# Patient Record
Sex: FEMALE | Race: WHITE | NOT HISPANIC OR LATINO | Employment: UNEMPLOYED | URBAN - NONMETROPOLITAN AREA
[De-identification: names, ages, dates, MRNs, and addresses within clinical notes are randomized per-mention and may not be internally consistent; named-entity substitution may affect disease eponyms.]

---
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
## 2024-01-22 ENCOUNTER — OFFICE VISIT (OUTPATIENT)
Dept: URGENT CARE | Facility: PHYSICIAN GROUP | Age: 19
End: 2024-01-22
Payer: MEDICAID

## 2024-01-22 VITALS
HEART RATE: 67 BPM | BODY MASS INDEX: 37.71 KG/M2 | TEMPERATURE: 97.1 F | OXYGEN SATURATION: 97 % | DIASTOLIC BLOOD PRESSURE: 84 MMHG | RESPIRATION RATE: 16 BRPM | SYSTOLIC BLOOD PRESSURE: 112 MMHG | HEIGHT: 69 IN | WEIGHT: 254.6 LBS

## 2024-01-22 DIAGNOSIS — H61.22 IMPACTED CERUMEN OF LEFT EAR: ICD-10-CM

## 2024-01-22 PROCEDURE — 3079F DIAST BP 80-89 MM HG: CPT | Performed by: FAMILY MEDICINE

## 2024-01-22 PROCEDURE — 69210 REMOVE IMPACTED EAR WAX UNI: CPT | Performed by: FAMILY MEDICINE

## 2024-01-22 PROCEDURE — 3074F SYST BP LT 130 MM HG: CPT | Performed by: FAMILY MEDICINE

## 2024-01-22 RX ORDER — ONDANSETRON 4 MG/1
TABLET, FILM COATED ORAL
COMMUNITY
Start: 2023-12-18 | End: 2024-01-23

## 2024-01-22 RX ORDER — METHYLPREDNISOLONE 4 MG/1
TABLET ORAL
COMMUNITY
Start: 2023-12-27 | End: 2024-01-23

## 2024-01-22 RX ORDER — PHENAZOPYRIDINE HYDROCHLORIDE 200 MG/1
TABLET, FILM COATED ORAL
COMMUNITY
Start: 2024-01-06 | End: 2024-01-23

## 2024-01-22 RX ORDER — SULFAMETHOXAZOLE AND TRIMETHOPRIM 800; 160 MG/1; MG/1
1 TABLET ORAL 2 TIMES DAILY
COMMUNITY
Start: 2024-01-06 | End: 2024-01-23

## 2024-01-22 ASSESSMENT — FIBROSIS 4 INDEX: FIB4 SCORE: 0.3

## 2024-01-22 NOTE — LETTER
January 22, 2024         Patient: Ani Archuleta   YOB: 2005   Date of Visit: 1/22/2024           To Whom it May Concern:    Ani Archuleta was seen in my clinic on 1/22/2024.       Please excuse absence due to doctor appointment.       If you have any questions or concerns, please don't hesitate to call.        Sincerely,           Ke White M.D.  Electronically Signed

## 2024-01-23 ENCOUNTER — OFFICE VISIT (OUTPATIENT)
Dept: MEDICAL GROUP | Facility: MEDICAL CENTER | Age: 19
End: 2024-01-23
Attending: FAMILY MEDICINE
Payer: MEDICAID

## 2024-01-23 DIAGNOSIS — Z23 NEED FOR VACCINATION: ICD-10-CM

## 2024-01-23 DIAGNOSIS — Z87.09 HISTORY OF STREP SORE THROAT: ICD-10-CM

## 2024-01-23 DIAGNOSIS — N92.1 MENORRHAGIA WITH IRREGULAR CYCLE: ICD-10-CM

## 2024-01-23 DIAGNOSIS — E28.2 PCOS (POLYCYSTIC OVARIAN SYNDROME): ICD-10-CM

## 2024-01-23 DIAGNOSIS — Q89.3 SITUS INVERSUS TOTALIS: ICD-10-CM

## 2024-01-23 PROCEDURE — 99204 OFFICE O/P NEW MOD 45 MIN: CPT | Mod: 25 | Performed by: FAMILY MEDICINE

## 2024-01-23 PROCEDURE — 99213 OFFICE O/P EST LOW 20 MIN: CPT | Performed by: FAMILY MEDICINE

## 2024-01-23 PROCEDURE — 90686 IIV4 VACC NO PRSV 0.5 ML IM: CPT

## 2024-01-23 RX ORDER — LEVONORGESTREL 52 MG/1
1 INTRAUTERINE DEVICE INTRAUTERINE ONCE
COMMUNITY

## 2024-01-23 ASSESSMENT — PATIENT HEALTH QUESTIONNAIRE - PHQ9: CLINICAL INTERPRETATION OF PHQ2 SCORE: 0

## 2024-01-23 NOTE — ASSESSMENT & PLAN NOTE
Patient previously referred to pulmonology for rule out of Kartagener syndrome given association with diagnosis.  Patient is otherwise clinically stable.  States that she was lost to follow-up, but would would like to have consultation given her recent issues with recurrent throat infections.

## 2024-01-23 NOTE — ASSESSMENT & PLAN NOTE
This is a chronic condition, ongoing since age 15.    Established with [facility]  Had Mirena IUD placed 5 weeks ago.  Previously was on Depo, but was not effective in helping control heavy bleeding.  Has been on various OCPs in past  Feels like her body gets used to treatment and then will stop responding.

## 2024-01-23 NOTE — PROGRESS NOTES
Subjective:     CC:  No chief complaint on file.      HISTORY OF THE PRESENT ILLNESS: Ani Archuleta is a 18 y.o. female.  This pleasant patient is here today with her mother Radha, to establish primary care with me and discuss the following issues.   Her prior PCP was Pina Grace; last seen 2022    Specialists   Gynecology    Menorrhagia with irregular cycle  This is a chronic condition, ongoing since age 15.    Established with [facility]  Had Mirena IUD placed 5 weeks ago.  Previously was on Depo, but was not effective in helping control heavy bleeding.  Has been on various OCPs in past  Feels like her body gets used to treatment and then will stop responding.    PCOS (polycystic ovarian syndrome)  This is recently confirmed diagnosis through her current Gyn.    Situs inversus totalis  Patient previously referred to pulmonology for rule out of Kartagener syndrome given association with diagnosis.  Patient is otherwise clinically stable.  States that she was lost to follow-up, but would would like to have consultation given her recent issues with recurrent throat infections.      Allergies: Patient has no known allergies.      [facility], [place] - [address] [facility]  [address] [place] [id number]  Phone: [phone] Fax: [phone]    [id number] [place], [place] - [address] [facility] [address] [place] [address] & [address]  [address] [place], [place] [address]  [facility] [address]  Phone: [phone] Fax: [phone]      Current Outpatient Medications   Medication Sig Dispense Refill    levonorgestrel (MIRENA, 52 MG,) 20 MCG/DAY IUD 1 Each by Intrauterine route one time.       No current facility-administered medications for this visit.       History reviewed. No pertinent past medical history.    History reviewed. No pertinent surgical history.    Family History   Adopted: Yes   Family history unknown: Yes       Social History     Tobacco Use    Smoking  status: Never    Smokeless tobacco: Never   Vaping Use    Vaping Use: Never used   Substance Use Topics    Alcohol use: Not Currently     Comment: Admits to occasional drinking    Drug use: Never       Objective:     There were no vitals taken for this visit.  Physical Exam  Constitutional: Alert, no distress  Skin: No rashes in visible areas  Eye: Conjunctiva clear, lids normal  Respiratory: Unlabored respiratory effort on room air, no cough, lungs clear to auscultation bilaterally  CV: RRR, auscultated on right side  MSK: Normal gait, moves all extremities  Psych: Alert and oriented x3, normal affect and mood      Assessment & Plan:   18 y.o. female with the following -    1. Menorrhagia with irregular cycle  - Referral to Gynecology per patient request.  Patient would like to reestablish with [facility] preferably for second opinions regarding management considerations.    2. PCOS (polycystic ovarian syndrome)  -Previously diagnosed; patient follows with gynecology who made diagnosis roughly 6 months ago.  Patient tells me that recent lab work was essentially within normal.  Will try to obtain records to determine need for additional test.  - Referral to Gynecology  - Lipid Profile; Future    3. Situs inversus totalis  -Chronic condition; clinically stable.  However patient reports previous recurrent throat infections.  Discussed association with Kartagener is syndrome and previous recommendation for specialty evaluation.patient and parent agreeable for consultation  - Referral to Gynecology  - Referral to Pulmonary and Sleep Medicine    4. History of strep sore throat  - Referral to ENT for consultation of tonsillectomy    5. Need for vaccination  - INFLUENZA VACCINE QUAD INJ (PF)    Other orders  - levonorgestrel (MIRENA, 52 MG,) 20 MCG/DAY IUD; 1 Each by Intrauterine route one time.       Return if symptoms worsen or fail to improve, for routine annual wellness exam.    Please note that this dictation was created  using voice recognition software. I have made every reasonable attempt to correct obvious errors, but I expect that there are errors of grammar and possibly content that I did not discover before finalizing the note.

## 2024-01-23 NOTE — PROGRESS NOTES
"Subjective:      Chief Complaint   Patient presents with    Otalgia     Left ear pain, with drainage and muffles hearing x 2 days              HPI        Pt c/o left  earwax impaction       Review of Systems   HENT: Positive for hearing loss.           Objective:     /84   Pulse 67   Temp 36.2 °C (97.1 °F) (Temporal)   Resp 16   Ht 1.753 m (5' 9\")   Wt 115 kg (254 lb 9.6 oz)   SpO2 97%       Physical Exam   Constitutional: He is oriented to person, place, and time. He appears well-developed. No distress.   HENT:   Head: Normocephalic and atraumatic.   Left ear:   cerumen impaction     Eyes: Conjunctivae are normal.   Cardiovascular: Normal rate.    Pulmonary/Chest: Effort normal.   Neurological: He is alert and oriented to person, place, and time.   Skin: Skin is warm. He is not diaphoretic. No erythema.   Psychiatric: His behavior is normal.   Nursing note and vitals reviewed.              Assessment/Plan:       1. Left  impacted cerumen    Ear canal  impacted with cerumen. Ear lavage was performed with normal saline, afterward impacted cerumen was removed with speculum. Subsequent to this, tympanic membrane visualized and was normal.     "

## 2024-02-01 ENCOUNTER — OFFICE VISIT (OUTPATIENT)
Dept: URGENT CARE | Facility: PHYSICIAN GROUP | Age: 19
End: 2024-02-01
Payer: MEDICAID

## 2024-02-01 ENCOUNTER — HOSPITAL ENCOUNTER (OUTPATIENT)
Facility: MEDICAL CENTER | Age: 19
End: 2024-02-01
Attending: PHYSICIAN ASSISTANT
Payer: MEDICAID

## 2024-02-01 VITALS
WEIGHT: 255 LBS | RESPIRATION RATE: 18 BRPM | BODY MASS INDEX: 40.98 KG/M2 | DIASTOLIC BLOOD PRESSURE: 70 MMHG | HEART RATE: 90 BPM | OXYGEN SATURATION: 99 % | TEMPERATURE: 98.6 F | SYSTOLIC BLOOD PRESSURE: 90 MMHG | HEIGHT: 66 IN

## 2024-02-01 DIAGNOSIS — J03.90 TONSILLITIS: ICD-10-CM

## 2024-02-01 LAB — S PYO DNA SPEC NAA+PROBE: NOT DETECTED

## 2024-02-01 PROCEDURE — 87070 CULTURE OTHR SPECIMN AEROBIC: CPT

## 2024-02-01 PROCEDURE — 3078F DIAST BP <80 MM HG: CPT | Performed by: PHYSICIAN ASSISTANT

## 2024-02-01 PROCEDURE — 99213 OFFICE O/P EST LOW 20 MIN: CPT | Performed by: PHYSICIAN ASSISTANT

## 2024-02-01 PROCEDURE — 87651 STREP A DNA AMP PROBE: CPT | Performed by: PHYSICIAN ASSISTANT

## 2024-02-01 PROCEDURE — 3074F SYST BP LT 130 MM HG: CPT | Performed by: PHYSICIAN ASSISTANT

## 2024-02-01 RX ORDER — LIDOCAINE HYDROCHLORIDE 20 MG/ML
5-15 SOLUTION OROPHARYNGEAL EVERY 4 HOURS PRN
Qty: 100 ML | Refills: 1 | Status: SHIPPED | OUTPATIENT
Start: 2024-02-01 | End: 2024-02-14

## 2024-02-01 RX ORDER — DEXAMETHASONE SODIUM PHOSPHATE 10 MG/ML
10 INJECTION INTRAMUSCULAR; INTRAVENOUS ONCE
Status: COMPLETED | OUTPATIENT
Start: 2024-02-01 | End: 2024-02-01

## 2024-02-01 RX ADMIN — DEXAMETHASONE SODIUM PHOSPHATE 10 MG: 10 INJECTION INTRAMUSCULAR; INTRAVENOUS at 15:27

## 2024-02-01 ASSESSMENT — ENCOUNTER SYMPTOMS
SORE THROAT: 1
HEADACHES: 1
CHILLS: 0
FEVER: 0
COUGH: 1
DIARRHEA: 1
NAUSEA: 0
VOMITING: 0
MYALGIAS: 1

## 2024-02-01 ASSESSMENT — FIBROSIS 4 INDEX: FIB4 SCORE: 0.3

## 2024-02-01 NOTE — PROGRESS NOTES
"Subjective     Ani Archuleta is a 18 y.o. female who presents with Pharyngitis    HPI:  Ani Archuleta is a 18 y.o. female who presents today for evaluation of sore throat and URI symptoms.  Symptoms started 2 to 3 days ago.  She has had sore throat which has been gradually worsening.  She has also had some congestion, cough, fatigue, headaches, body aches, diarrhea.  She has not had any fever.  Took some ibuprofen yesterday but says it did not help very much and she was having a hard time swallowing the pills.  Of note, patient has recurrent strep infections.  She is scheduled to see an ear nose and throat specialist on March 5.        Review of Systems   Constitutional:  Positive for malaise/fatigue. Negative for chills and fever.   HENT:  Positive for congestion and sore throat.    Respiratory:  Positive for cough.    Gastrointestinal:  Positive for diarrhea. Negative for nausea and vomiting.   Musculoskeletal:  Positive for myalgias.   Neurological:  Positive for headaches.             PMH:  has no past medical history of Asthma or Diabetes ([facility]).  MEDS:   Current Outpatient Medications:     levonorgestrel (MIRENA, 52 MG,) 20 MCG/DAY IUD, 1 Each by Intrauterine route one time., Disp: , Rfl:   ALLERGIES: No Known Allergies  SURGHX: No past surgical history on file.  SOCHX:  reports that she has never smoked. She has never used smokeless tobacco. She reports that she does not currently use alcohol. She reports that she does not use drugs.  FH: Family history was reviewed, no pertinent findings to report      Objective     BP 90/70   Pulse 90   Temp 37 °C (98.6 °F) (Temporal)   Resp 18   Ht 1.676 m (5' 6\")   Wt 116 kg (255 lb)   SpO2 99%   BMI 41.16 kg/m²      Physical Exam  Constitutional:       Appearance: She is well-developed.   HENT:      Head: Normocephalic and atraumatic.      Right Ear: Tympanic membrane, ear canal and external ear normal.      Left Ear: Tympanic membrane, ear canal and " external ear normal.      Nose: Mucosal edema and congestion present. No rhinorrhea.      Mouth/Throat:      Lips: Pink.      Mouth: Mucous membranes are moist.      Pharynx: Uvula midline. Oropharyngeal exudate and posterior oropharyngeal erythema present. No uvula swelling.      Tonsils: Tonsillar exudate present. No tonsillar abscesses. 2+ on the right. 2+ on the left.   Eyes:      Conjunctiva/sclera: Conjunctivae normal.      Pupils: Pupils are equal, round, and reactive to light.   Cardiovascular:      Rate and Rhythm: Normal rate and regular rhythm.      Heart sounds: Normal heart sounds. No murmur heard.  Pulmonary:      Effort: Pulmonary effort is normal.      Breath sounds: Normal breath sounds. No wheezing.   Musculoskeletal:      Cervical back: Normal range of motion.   Lymphadenopathy:      Cervical: Cervical adenopathy present.      Right cervical: Superficial cervical adenopathy present.      Left cervical: Superficial cervical adenopathy present.   Skin:     General: Skin is warm and dry.      Capillary Refill: Capillary refill takes less than 2 seconds.   Neurological:      Mental Status: She is alert and oriented to person, place, and time.   Psychiatric:         Behavior: Behavior normal.         Judgment: Judgment normal.       POCT GROUP A STREP, PCR - Negative    Assessment & Plan     1. Tonsillitis  - POCT GROUP A STREP, PCR  - dexamethasone (Decadron) injection (check route below) 10 mg  - CULTURE THROAT; Future  - lidocaine (XYLOCAINE) 2 % Solution; Take 5-15 mL by mouth every four hours as needed for Throat/Mouth Pain.  Dispense: 100 mL; Refill: 1  PCR strep test negative.  Discussed results with patient.  Did discuss that given her constellation of symptoms I think it is very likely that this is a viral etiology but given her history we will obtain a throat culture to further rule out any bacterial infection.  Will notify her via [employer] when results become available and we will send over  antibiotics if needed.  For now she was treated with 10 mg oral Decadron and I have sent over some viscous lidocaine solution to the pharmacy.  She can also utilize supportive care at home to include salt water gargles, throat lozenges, tea, increase fluid intake.  She may use OTC acetaminophen or ibuprofen as needed for pain or fever.                      Differential Diagnosis, natural history, and supportive care discussed. Return to the Urgent Care or follow up with your PCP if symptoms fail to resolve, or for any new or worsening symptoms. Emergency room precautions discussed. Patient and/or family appears understanding of information.

## 2024-02-06 ENCOUNTER — APPOINTMENT (OUTPATIENT)
Dept: RADIOLOGY | Facility: MEDICAL CENTER | Age: 19
DRG: 603 | End: 2024-02-06
Attending: EMERGENCY MEDICINE
Payer: MEDICAID

## 2024-02-06 ENCOUNTER — HOSPITAL ENCOUNTER (INPATIENT)
Facility: MEDICAL CENTER | Age: 19
LOS: 6 days | DRG: 603 | End: 2024-02-12
Attending: EMERGENCY MEDICINE | Admitting: SURGERY
Payer: MEDICAID

## 2024-02-06 ENCOUNTER — ANESTHESIA EVENT (OUTPATIENT)
Dept: SURGERY | Facility: MEDICAL CENTER | Age: 19
DRG: 603 | End: 2024-02-06
Payer: MEDICAID

## 2024-02-06 ENCOUNTER — ANESTHESIA (OUTPATIENT)
Dept: SURGERY | Facility: MEDICAL CENTER | Age: 19
DRG: 603 | End: 2024-02-06
Payer: MEDICAID

## 2024-02-06 DIAGNOSIS — L05.01 SACROCOCCYGEAL PILONIDAL CYST WITH ABSCESS: ICD-10-CM

## 2024-02-06 DIAGNOSIS — Q89.3 SITUS INVERSUS TOTALIS: ICD-10-CM

## 2024-02-06 DIAGNOSIS — L05.01 PILONIDAL CYST WITH ABSCESS: ICD-10-CM

## 2024-02-06 DIAGNOSIS — M53.3 SACRAL PAIN: ICD-10-CM

## 2024-02-06 PROBLEM — E66.01 MORBID OBESITY WITH BMI OF 40.0-44.9, ADULT (HCC): Status: ACTIVE | Noted: 2021-08-11

## 2024-02-06 PROBLEM — L05.91 PILONIDAL CYST: Status: ACTIVE | Noted: 2024-02-06

## 2024-02-06 LAB
ALBUMIN SERPL BCP-MCNC: 4.3 G/DL (ref 3.2–4.9)
ALBUMIN/GLOB SERPL: 1.3 G/DL
ALP SERPL-CCNC: 116 U/L (ref 30–99)
ALT SERPL-CCNC: 17 U/L (ref 2–50)
ANION GAP SERPL CALC-SCNC: 14 MMOL/L (ref 7–16)
APPEARANCE UR: ABNORMAL
AST SERPL-CCNC: 11 U/L (ref 12–45)
BACTERIA #/AREA URNS HPF: ABNORMAL /HPF
BASOPHILS # BLD AUTO: 0.1 % (ref 0–1.8)
BASOPHILS # BLD: 0.02 K/UL (ref 0–0.12)
BILIRUB SERPL-MCNC: 0.5 MG/DL (ref 0.1–1.5)
BILIRUB UR QL STRIP.AUTO: NEGATIVE
BUN SERPL-MCNC: 9 MG/DL (ref 8–22)
CALCIUM ALBUM COR SERPL-MCNC: 9.1 MG/DL (ref 8.5–10.5)
CALCIUM SERPL-MCNC: 9.3 MG/DL (ref 8.5–10.5)
CHLORIDE SERPL-SCNC: 106 MMOL/L (ref 96–112)
CO2 SERPL-SCNC: 19 MMOL/L (ref 20–33)
COLOR UR: YELLOW
CREAT SERPL-MCNC: 0.66 MG/DL (ref 0.5–1.4)
CRP SERPL HS-MCNC: 3.17 MG/DL (ref 0–0.75)
EOSINOPHIL # BLD AUTO: 0.04 K/UL (ref 0–0.51)
EOSINOPHIL NFR BLD: 0.3 % (ref 0–6.9)
EPI CELLS #/AREA URNS HPF: ABNORMAL /HPF
ERYTHROCYTE [DISTWIDTH] IN BLOOD BY AUTOMATED COUNT: 39.9 FL (ref 35.9–50)
ERYTHROCYTE [SEDIMENTATION RATE] IN BLOOD BY WESTERGREN METHOD: 5 MM/HOUR (ref 0–25)
GFR SERPLBLD CREATININE-BSD FMLA CKD-EPI: 129 ML/MIN/1.73 M 2
GLOBULIN SER CALC-MCNC: 3.2 G/DL (ref 1.9–3.5)
GLUCOSE SERPL-MCNC: 110 MG/DL (ref 65–99)
GLUCOSE UR STRIP.AUTO-MCNC: NEGATIVE MG/DL
HCG SERPL QL: NEGATIVE
HCT VFR BLD AUTO: 48.9 % (ref 37–47)
HGB BLD-MCNC: 16.4 G/DL (ref 12–16)
HYALINE CASTS #/AREA URNS LPF: ABNORMAL /LPF
IMM GRANULOCYTES # BLD AUTO: 0.06 K/UL (ref 0–0.11)
IMM GRANULOCYTES NFR BLD AUTO: 0.4 % (ref 0–0.9)
KETONES UR STRIP.AUTO-MCNC: NEGATIVE MG/DL
LEUKOCYTE ESTERASE UR QL STRIP.AUTO: ABNORMAL
LYMPHOCYTES # BLD AUTO: 2.85 K/UL (ref 1–4.8)
LYMPHOCYTES NFR BLD: 20.4 % (ref 22–41)
MCH RBC QN AUTO: 29 PG (ref 27–33)
MCHC RBC AUTO-ENTMCNC: 33.5 G/DL (ref 32.2–35.5)
MCV RBC AUTO: 86.5 FL (ref 81.4–97.8)
MICRO URNS: ABNORMAL
MONOCYTES # BLD AUTO: 1.01 K/UL (ref 0–0.85)
MONOCYTES NFR BLD AUTO: 7.2 % (ref 0–13.4)
NEUTROPHILS # BLD AUTO: 9.96 K/UL (ref 1.82–7.42)
NEUTROPHILS NFR BLD: 71.6 % (ref 44–72)
NITRITE UR QL STRIP.AUTO: NEGATIVE
NRBC # BLD AUTO: 0 K/UL
NRBC BLD-RTO: 0 /100 WBC (ref 0–0.2)
PH UR STRIP.AUTO: 6 [PH] (ref 5–8)
PLATELET # BLD AUTO: 285 K/UL (ref 164–446)
PMV BLD AUTO: 9.4 FL (ref 9–12.9)
POTASSIUM SERPL-SCNC: 4.1 MMOL/L (ref 3.6–5.5)
PROT SERPL-MCNC: 7.5 G/DL (ref 6–8.2)
PROT UR QL STRIP: NEGATIVE MG/DL
RBC # BLD AUTO: 5.65 M/UL (ref 4.2–5.4)
RBC # URNS HPF: ABNORMAL /HPF
RBC UR QL AUTO: ABNORMAL
SODIUM SERPL-SCNC: 139 MMOL/L (ref 135–145)
SP GR UR STRIP.AUTO: >=1.03
UROBILINOGEN UR STRIP.AUTO-MCNC: 0.2 MG/DL
WBC # BLD AUTO: 13.9 K/UL (ref 4.8–10.8)
WBC #/AREA URNS HPF: ABNORMAL /HPF

## 2024-02-06 PROCEDURE — 87205 SMEAR GRAM STAIN: CPT | Mod: 91

## 2024-02-06 PROCEDURE — 700102 HCHG RX REV CODE 250 W/ 637 OVERRIDE(OP): Performed by: ANESTHESIOLOGY

## 2024-02-06 PROCEDURE — 160048 HCHG OR STATISTICAL LEVEL 1-5: Performed by: SURGERY

## 2024-02-06 PROCEDURE — A9270 NON-COVERED ITEM OR SERVICE: HCPCS | Performed by: ANESTHESIOLOGY

## 2024-02-06 PROCEDURE — 81001 URINALYSIS AUTO W/SCOPE: CPT

## 2024-02-06 PROCEDURE — A9270 NON-COVERED ITEM OR SERVICE: HCPCS

## 2024-02-06 PROCEDURE — 700101 HCHG RX REV CODE 250

## 2024-02-06 PROCEDURE — 87070 CULTURE OTHR SPECIMN AEROBIC: CPT

## 2024-02-06 PROCEDURE — 700111 HCHG RX REV CODE 636 W/ 250 OVERRIDE (IP): Performed by: ANESTHESIOLOGY

## 2024-02-06 PROCEDURE — 87102 FUNGUS ISOLATION CULTURE: CPT

## 2024-02-06 PROCEDURE — 700102 HCHG RX REV CODE 250 W/ 637 OVERRIDE(OP)

## 2024-02-06 PROCEDURE — 700101 HCHG RX REV CODE 250: Performed by: ANESTHESIOLOGY

## 2024-02-06 PROCEDURE — 85652 RBC SED RATE AUTOMATED: CPT

## 2024-02-06 PROCEDURE — 99291 CRITICAL CARE FIRST HOUR: CPT

## 2024-02-06 PROCEDURE — 700111 HCHG RX REV CODE 636 W/ 250 OVERRIDE (IP): Mod: JZ | Performed by: ANESTHESIOLOGY

## 2024-02-06 PROCEDURE — 700111 HCHG RX REV CODE 636 W/ 250 OVERRIDE (IP)

## 2024-02-06 PROCEDURE — 0J990ZZ DRAINAGE OF BUTTOCK SUBCUTANEOUS TISSUE AND FASCIA, OPEN APPROACH: ICD-10-PCS | Performed by: SURGERY

## 2024-02-06 PROCEDURE — 700117 HCHG RX CONTRAST REV CODE 255: Mod: UD | Performed by: EMERGENCY MEDICINE

## 2024-02-06 PROCEDURE — 80053 COMPREHEN METABOLIC PANEL: CPT

## 2024-02-06 PROCEDURE — 770001 HCHG ROOM/CARE - MED/SURG/GYN PRIV*

## 2024-02-06 PROCEDURE — 10081 I&D PILONIDAL CYST COMP: CPT | Performed by: SURGERY

## 2024-02-06 PROCEDURE — 36415 COLL VENOUS BLD VENIPUNCTURE: CPT

## 2024-02-06 PROCEDURE — 160038 HCHG SURGERY MINUTES - EA ADDL 1 MIN LEVEL 2: Performed by: SURGERY

## 2024-02-06 PROCEDURE — 84703 CHORIONIC GONADOTROPIN ASSAY: CPT

## 2024-02-06 PROCEDURE — 96375 TX/PRO/DX INJ NEW DRUG ADDON: CPT

## 2024-02-06 PROCEDURE — 86140 C-REACTIVE PROTEIN: CPT

## 2024-02-06 PROCEDURE — 160002 HCHG RECOVERY MINUTES (STAT): Performed by: SURGERY

## 2024-02-06 PROCEDURE — 87075 CULTR BACTERIA EXCEPT BLOOD: CPT

## 2024-02-06 PROCEDURE — 87077 CULTURE AEROBIC IDENTIFY: CPT

## 2024-02-06 PROCEDURE — 160027 HCHG SURGERY MINUTES - 1ST 30 MINS LEVEL 2: Performed by: SURGERY

## 2024-02-06 PROCEDURE — 96374 THER/PROPH/DIAG INJ IV PUSH: CPT

## 2024-02-06 PROCEDURE — 160035 HCHG PACU - 1ST 60 MINS PHASE I: Performed by: SURGERY

## 2024-02-06 PROCEDURE — 85025 COMPLETE CBC W/AUTO DIFF WBC: CPT

## 2024-02-06 PROCEDURE — 74177 CT ABD & PELVIS W/CONTRAST: CPT

## 2024-02-06 PROCEDURE — 700111 HCHG RX REV CODE 636 W/ 250 OVERRIDE (IP): Performed by: EMERGENCY MEDICINE

## 2024-02-06 PROCEDURE — 87076 CULTURE ANAEROBE IDENT EACH: CPT

## 2024-02-06 PROCEDURE — 160009 HCHG ANES TIME/MIN: Performed by: SURGERY

## 2024-02-06 PROCEDURE — 700105 HCHG RX REV CODE 258: Performed by: ANESTHESIOLOGY

## 2024-02-06 PROCEDURE — 99253 IP/OBS CNSLTJ NEW/EST LOW 45: CPT | Mod: 25 | Performed by: SURGERY

## 2024-02-06 PROCEDURE — 700101 HCHG RX REV CODE 250: Performed by: SURGERY

## 2024-02-06 RX ORDER — SUCCINYLCHOLINE CHLORIDE 20 MG/ML
INJECTION INTRAMUSCULAR; INTRAVENOUS PRN
Status: DISCONTINUED | OUTPATIENT
Start: 2024-02-06 | End: 2024-02-06 | Stop reason: SURG

## 2024-02-06 RX ORDER — OXYCODONE HYDROCHLORIDE 5 MG/1
5 TABLET ORAL
Status: DISCONTINUED | OUTPATIENT
Start: 2024-02-06 | End: 2024-02-12 | Stop reason: HOSPADM

## 2024-02-06 RX ORDER — HALOPERIDOL 5 MG/ML
INJECTION INTRAMUSCULAR PRN
Status: DISCONTINUED | OUTPATIENT
Start: 2024-02-06 | End: 2024-02-06 | Stop reason: SURG

## 2024-02-06 RX ORDER — ENEMA 19; 7 G/133ML; G/133ML
1 ENEMA RECTAL
Status: DISCONTINUED | OUTPATIENT
Start: 2024-02-06 | End: 2024-02-12 | Stop reason: HOSPADM

## 2024-02-06 RX ORDER — ONDANSETRON 2 MG/ML
4 INJECTION INTRAMUSCULAR; INTRAVENOUS ONCE
Status: COMPLETED | OUTPATIENT
Start: 2024-02-06 | End: 2024-02-06

## 2024-02-06 RX ORDER — HYDROMORPHONE HYDROCHLORIDE 1 MG/ML
0.5 INJECTION, SOLUTION INTRAMUSCULAR; INTRAVENOUS; SUBCUTANEOUS
Status: DISCONTINUED | OUTPATIENT
Start: 2024-02-06 | End: 2024-02-12 | Stop reason: HOSPADM

## 2024-02-06 RX ORDER — OXYCODONE HCL 5 MG/5 ML
5 SOLUTION, ORAL ORAL
Status: COMPLETED | OUTPATIENT
Start: 2024-02-06 | End: 2024-02-06

## 2024-02-06 RX ORDER — KETOROLAC TROMETHAMINE 15 MG/ML
INJECTION, SOLUTION INTRAMUSCULAR; INTRAVENOUS PRN
Status: DISCONTINUED | OUTPATIENT
Start: 2024-02-06 | End: 2024-02-06 | Stop reason: SURG

## 2024-02-06 RX ORDER — DIPHENHYDRAMINE HYDROCHLORIDE 50 MG/ML
12.5 INJECTION INTRAMUSCULAR; INTRAVENOUS
Status: DISCONTINUED | OUTPATIENT
Start: 2024-02-06 | End: 2024-02-06 | Stop reason: HOSPADM

## 2024-02-06 RX ORDER — DOXYCYCLINE HYCLATE 100 MG/1
100 CAPSULE ORAL 2 TIMES DAILY
Status: ON HOLD | COMMUNITY
Start: 2024-02-05 | End: 2024-02-09

## 2024-02-06 RX ORDER — HYDROMORPHONE HYDROCHLORIDE 1 MG/ML
0.1 INJECTION, SOLUTION INTRAMUSCULAR; INTRAVENOUS; SUBCUTANEOUS
Status: DISCONTINUED | OUTPATIENT
Start: 2024-02-06 | End: 2024-02-06 | Stop reason: HOSPADM

## 2024-02-06 RX ORDER — ONDANSETRON 2 MG/ML
4 INJECTION INTRAMUSCULAR; INTRAVENOUS EVERY 4 HOURS PRN
Status: DISCONTINUED | OUTPATIENT
Start: 2024-02-06 | End: 2024-02-12 | Stop reason: HOSPADM

## 2024-02-06 RX ORDER — CEFOTETAN DISODIUM 2 G/20ML
INJECTION, POWDER, FOR SOLUTION INTRAMUSCULAR; INTRAVENOUS PRN
Status: DISCONTINUED | OUTPATIENT
Start: 2024-02-06 | End: 2024-02-06 | Stop reason: SURG

## 2024-02-06 RX ORDER — KETOROLAC TROMETHAMINE 15 MG/ML
15 INJECTION, SOLUTION INTRAMUSCULAR; INTRAVENOUS ONCE
Status: COMPLETED | OUTPATIENT
Start: 2024-02-06 | End: 2024-02-06

## 2024-02-06 RX ORDER — AMOXICILLIN 250 MG
1 CAPSULE ORAL
Status: DISCONTINUED | OUTPATIENT
Start: 2024-02-06 | End: 2024-02-12 | Stop reason: HOSPADM

## 2024-02-06 RX ORDER — CELECOXIB 200 MG/1
200 CAPSULE ORAL 2 TIMES DAILY
Status: COMPLETED | OUTPATIENT
Start: 2024-02-06 | End: 2024-02-11

## 2024-02-06 RX ORDER — ACETAMINOPHEN 500 MG
1000 TABLET ORAL EVERY 6 HOURS PRN
Status: DISCONTINUED | OUTPATIENT
Start: 2024-02-12 | End: 2024-02-12 | Stop reason: HOSPADM

## 2024-02-06 RX ORDER — HYDROMORPHONE HYDROCHLORIDE 1 MG/ML
0.2 INJECTION, SOLUTION INTRAMUSCULAR; INTRAVENOUS; SUBCUTANEOUS
Status: DISCONTINUED | OUTPATIENT
Start: 2024-02-06 | End: 2024-02-06 | Stop reason: HOSPADM

## 2024-02-06 RX ORDER — AMOXICILLIN 250 MG
1 CAPSULE ORAL NIGHTLY
Status: DISCONTINUED | OUTPATIENT
Start: 2024-02-06 | End: 2024-02-12 | Stop reason: HOSPADM

## 2024-02-06 RX ORDER — SULFAMETHOXAZOLE AND TRIMETHOPRIM 800; 160 MG/1; MG/1
1 TABLET ORAL 2 TIMES DAILY
Status: ON HOLD | COMMUNITY
Start: 2024-01-06 | End: 2024-02-09

## 2024-02-06 RX ORDER — OXYCODONE HYDROCHLORIDE 5 MG/1
10 TABLET ORAL
Status: DISCONTINUED | OUTPATIENT
Start: 2024-02-06 | End: 2024-02-12 | Stop reason: HOSPADM

## 2024-02-06 RX ORDER — ONDANSETRON 2 MG/ML
4 INJECTION INTRAMUSCULAR; INTRAVENOUS
Status: DISCONTINUED | OUTPATIENT
Start: 2024-02-06 | End: 2024-02-06 | Stop reason: HOSPADM

## 2024-02-06 RX ORDER — OXYCODONE HCL 5 MG/5 ML
10 SOLUTION, ORAL ORAL
Status: COMPLETED | OUTPATIENT
Start: 2024-02-06 | End: 2024-02-06

## 2024-02-06 RX ORDER — MIDAZOLAM HYDROCHLORIDE 1 MG/ML
1 INJECTION INTRAMUSCULAR; INTRAVENOUS
Status: DISCONTINUED | OUTPATIENT
Start: 2024-02-06 | End: 2024-02-06 | Stop reason: HOSPADM

## 2024-02-06 RX ORDER — SODIUM CHLORIDE, SODIUM LACTATE, POTASSIUM CHLORIDE, CALCIUM CHLORIDE 600; 310; 30; 20 MG/100ML; MG/100ML; MG/100ML; MG/100ML
INJECTION, SOLUTION INTRAVENOUS
Status: DISCONTINUED | OUTPATIENT
Start: 2024-02-06 | End: 2024-02-06 | Stop reason: SURG

## 2024-02-06 RX ORDER — HYDROMORPHONE HYDROCHLORIDE 1 MG/ML
1 INJECTION, SOLUTION INTRAMUSCULAR; INTRAVENOUS; SUBCUTANEOUS ONCE
Status: COMPLETED | OUTPATIENT
Start: 2024-02-06 | End: 2024-02-06

## 2024-02-06 RX ORDER — CELECOXIB 200 MG/1
200 CAPSULE ORAL 2 TIMES DAILY PRN
Status: DISCONTINUED | OUTPATIENT
Start: 2024-02-11 | End: 2024-02-12 | Stop reason: HOSPADM

## 2024-02-06 RX ORDER — BISACODYL 10 MG
10 SUPPOSITORY, RECTAL RECTAL
Status: DISCONTINUED | OUTPATIENT
Start: 2024-02-06 | End: 2024-02-12 | Stop reason: HOSPADM

## 2024-02-06 RX ORDER — ACETAMINOPHEN 500 MG
1000 TABLET ORAL EVERY 6 HOURS
Status: DISPENSED | OUTPATIENT
Start: 2024-02-07 | End: 2024-02-12

## 2024-02-06 RX ORDER — LABETALOL HYDROCHLORIDE 5 MG/ML
5 INJECTION, SOLUTION INTRAVENOUS
Status: DISCONTINUED | OUTPATIENT
Start: 2024-02-06 | End: 2024-02-06 | Stop reason: HOSPADM

## 2024-02-06 RX ORDER — ONDANSETRON 4 MG/1
4 TABLET, ORALLY DISINTEGRATING ORAL EVERY 4 HOURS PRN
Status: DISCONTINUED | OUTPATIENT
Start: 2024-02-06 | End: 2024-02-12 | Stop reason: HOSPADM

## 2024-02-06 RX ORDER — ENOXAPARIN SODIUM 100 MG/ML
30 INJECTION SUBCUTANEOUS EVERY 12 HOURS
Status: DISCONTINUED | OUTPATIENT
Start: 2024-02-07 | End: 2024-02-12 | Stop reason: HOSPADM

## 2024-02-06 RX ORDER — MORPHINE SULFATE 4 MG/ML
4 INJECTION INTRAVENOUS ONCE
Status: COMPLETED | OUTPATIENT
Start: 2024-02-06 | End: 2024-02-06

## 2024-02-06 RX ORDER — MEPERIDINE HYDROCHLORIDE 25 MG/ML
12.5 INJECTION INTRAMUSCULAR; INTRAVENOUS; SUBCUTANEOUS
Status: DISCONTINUED | OUTPATIENT
Start: 2024-02-06 | End: 2024-02-06 | Stop reason: HOSPADM

## 2024-02-06 RX ORDER — HYDROMORPHONE HYDROCHLORIDE 1 MG/ML
0.5 INJECTION, SOLUTION INTRAMUSCULAR; INTRAVENOUS; SUBCUTANEOUS ONCE
Status: COMPLETED | OUTPATIENT
Start: 2024-02-06 | End: 2024-02-06

## 2024-02-06 RX ORDER — BUPIVACAINE HYDROCHLORIDE AND EPINEPHRINE 5; 5 MG/ML; UG/ML
INJECTION, SOLUTION EPIDURAL; INTRACAUDAL; PERINEURAL
Status: DISCONTINUED | OUTPATIENT
Start: 2024-02-06 | End: 2024-02-06 | Stop reason: HOSPADM

## 2024-02-06 RX ORDER — DOCUSATE SODIUM 100 MG/1
100 CAPSULE, LIQUID FILLED ORAL 2 TIMES DAILY
Status: DISCONTINUED | OUTPATIENT
Start: 2024-02-06 | End: 2024-02-12 | Stop reason: HOSPADM

## 2024-02-06 RX ORDER — METOPROLOL TARTRATE 1 MG/ML
1 INJECTION, SOLUTION INTRAVENOUS
Status: DISCONTINUED | OUTPATIENT
Start: 2024-02-06 | End: 2024-02-06 | Stop reason: HOSPADM

## 2024-02-06 RX ORDER — POLYETHYLENE GLYCOL 3350 17 G/17G
1 POWDER, FOR SOLUTION ORAL 2 TIMES DAILY
Status: DISCONTINUED | OUTPATIENT
Start: 2024-02-06 | End: 2024-02-12 | Stop reason: HOSPADM

## 2024-02-06 RX ORDER — EPHEDRINE SULFATE 50 MG/ML
5 INJECTION, SOLUTION INTRAVENOUS
Status: DISCONTINUED | OUTPATIENT
Start: 2024-02-06 | End: 2024-02-06 | Stop reason: HOSPADM

## 2024-02-06 RX ORDER — ONDANSETRON 2 MG/ML
INJECTION INTRAMUSCULAR; INTRAVENOUS PRN
Status: DISCONTINUED | OUTPATIENT
Start: 2024-02-06 | End: 2024-02-06 | Stop reason: SURG

## 2024-02-06 RX ORDER — SODIUM CHLORIDE, SODIUM LACTATE, POTASSIUM CHLORIDE, CALCIUM CHLORIDE 600; 310; 30; 20 MG/100ML; MG/100ML; MG/100ML; MG/100ML
INJECTION, SOLUTION INTRAVENOUS CONTINUOUS
Status: DISCONTINUED | OUTPATIENT
Start: 2024-02-06 | End: 2024-02-06 | Stop reason: HOSPADM

## 2024-02-06 RX ORDER — HYDROMORPHONE HYDROCHLORIDE 1 MG/ML
0.4 INJECTION, SOLUTION INTRAMUSCULAR; INTRAVENOUS; SUBCUTANEOUS
Status: DISCONTINUED | OUTPATIENT
Start: 2024-02-06 | End: 2024-02-06 | Stop reason: HOSPADM

## 2024-02-06 RX ORDER — MIDAZOLAM HYDROCHLORIDE 1 MG/ML
INJECTION INTRAMUSCULAR; INTRAVENOUS PRN
Status: DISCONTINUED | OUTPATIENT
Start: 2024-02-06 | End: 2024-02-06 | Stop reason: SURG

## 2024-02-06 RX ORDER — ACETAMINOPHEN 500 MG
1000 TABLET ORAL EVERY 4 HOURS PRN
Status: DISCONTINUED | OUTPATIENT
Start: 2024-02-06 | End: 2024-02-06 | Stop reason: HOSPADM

## 2024-02-06 RX ORDER — HALOPERIDOL 5 MG/ML
1 INJECTION INTRAMUSCULAR
Status: DISCONTINUED | OUTPATIENT
Start: 2024-02-06 | End: 2024-02-06 | Stop reason: HOSPADM

## 2024-02-06 RX ADMIN — OXYCODONE HYDROCHLORIDE 10 MG: 5 SOLUTION ORAL at 20:50

## 2024-02-06 RX ADMIN — HALOPERIDOL LACTATE 1 MG: 5 INJECTION, SOLUTION INTRAMUSCULAR at 19:54

## 2024-02-06 RX ADMIN — KETOROLAC TROMETHAMINE 15 MG: 15 INJECTION, SOLUTION INTRAMUSCULAR; INTRAVENOUS at 20:13

## 2024-02-06 RX ADMIN — CEFOTETAN DISODIUM 2 G: 2 INJECTION, POWDER, FOR SOLUTION INTRAMUSCULAR; INTRAVENOUS at 19:39

## 2024-02-06 RX ADMIN — PROPOFOL 50 MG: 10 INJECTION, EMULSION INTRAVENOUS at 19:34

## 2024-02-06 RX ADMIN — ONDANSETRON 4 MG: 2 INJECTION INTRAMUSCULAR; INTRAVENOUS at 13:07

## 2024-02-06 RX ADMIN — MORPHINE SULFATE 4 MG: 4 INJECTION, SOLUTION INTRAMUSCULAR; INTRAVENOUS at 13:07

## 2024-02-06 RX ADMIN — FENTANYL CITRATE 250 MCG: 50 INJECTION, SOLUTION INTRAMUSCULAR; INTRAVENOUS at 19:21

## 2024-02-06 RX ADMIN — CEFTRIAXONE SODIUM 1000 MG: 10 INJECTION, POWDER, FOR SOLUTION INTRAVENOUS at 22:39

## 2024-02-06 RX ADMIN — ACETAMINOPHEN 1000 MG: 500 TABLET ORAL at 20:50

## 2024-02-06 RX ADMIN — FENTANYL CITRATE 50 MCG: 50 INJECTION, SOLUTION INTRAMUSCULAR; INTRAVENOUS at 21:10

## 2024-02-06 RX ADMIN — SODIUM CHLORIDE, POTASSIUM CHLORIDE, SODIUM LACTATE AND CALCIUM CHLORIDE: 600; 310; 30; 20 INJECTION, SOLUTION INTRAVENOUS at 19:09

## 2024-02-06 RX ADMIN — DOCUSATE SODIUM 50 MG AND SENNOSIDES 8.6 MG 1 TABLET: 8.6; 5 TABLET, FILM COATED ORAL at 21:57

## 2024-02-06 RX ADMIN — SUCCINYLCHOLINE CHLORIDE 100 MG: 20 INJECTION, SOLUTION INTRAMUSCULAR; INTRAVENOUS at 19:23

## 2024-02-06 RX ADMIN — KETOROLAC TROMETHAMINE 15 MG: 15 INJECTION, SOLUTION INTRAMUSCULAR; INTRAVENOUS at 13:48

## 2024-02-06 RX ADMIN — PROPOFOL 150 MG: 10 INJECTION, EMULSION INTRAVENOUS at 19:21

## 2024-02-06 RX ADMIN — PROPOFOL 50 MG: 10 INJECTION, EMULSION INTRAVENOUS at 19:28

## 2024-02-06 RX ADMIN — METOPROLOL TARTRATE 1 MG: 1 INJECTION, SOLUTION INTRAVENOUS at 21:13

## 2024-02-06 RX ADMIN — IOHEXOL 100 ML: 350 INJECTION, SOLUTION INTRAVENOUS at 16:30

## 2024-02-06 RX ADMIN — MIDAZOLAM HYDROCHLORIDE 2 MG: 1 INJECTION, SOLUTION INTRAMUSCULAR; INTRAVENOUS at 19:18

## 2024-02-06 RX ADMIN — HYDROMORPHONE HYDROCHLORIDE 1 MG: 1 INJECTION, SOLUTION INTRAMUSCULAR; INTRAVENOUS; SUBCUTANEOUS at 19:05

## 2024-02-06 RX ADMIN — SUCCINYLCHOLINE CHLORIDE 100 MG: 20 INJECTION, SOLUTION INTRAMUSCULAR; INTRAVENOUS at 19:26

## 2024-02-06 RX ADMIN — HYDROMORPHONE HYDROCHLORIDE 0.5 MG: 1 INJECTION, SOLUTION INTRAMUSCULAR; INTRAVENOUS; SUBCUTANEOUS at 16:12

## 2024-02-06 RX ADMIN — ONDANSETRON 4 MG: 2 INJECTION INTRAMUSCULAR; INTRAVENOUS at 19:54

## 2024-02-06 RX ADMIN — CELECOXIB 200 MG: 200 CAPSULE ORAL at 21:57

## 2024-02-06 ASSESSMENT — LIFESTYLE VARIABLES
DOES PATIENT WANT TO STOP DRINKING: NO
TOTAL SCORE: 0
TOTAL SCORE: 0
EVER HAD A DRINK FIRST THING IN THE MORNING TO STEADY YOUR NERVES TO GET RID OF A HANGOVER: NO
AVERAGE NUMBER OF DAYS PER WEEK YOU HAVE A DRINK CONTAINING ALCOHOL: 0
HOW MANY TIMES IN THE PAST YEAR HAVE YOU HAD 5 OR MORE DRINKS IN A DAY: 0
ALCOHOL_USE: YES
EVER FELT BAD OR GUILTY ABOUT YOUR DRINKING: NO
HAVE PEOPLE ANNOYED YOU BY CRITICIZING YOUR DRINKING: NO
HAVE YOU EVER FELT YOU SHOULD CUT DOWN ON YOUR DRINKING: NO
CONSUMPTION TOTAL: NEGATIVE
ON A TYPICAL DAY WHEN YOU DRINK ALCOHOL HOW MANY DRINKS DO YOU HAVE: 1
TOTAL SCORE: 0

## 2024-02-06 ASSESSMENT — PAIN SCALES - GENERAL: PAIN_LEVEL: 0

## 2024-02-06 ASSESSMENT — PAIN DESCRIPTION - PAIN TYPE
TYPE: SURGICAL PAIN
TYPE: ACUTE PAIN
TYPE: SURGICAL PAIN

## 2024-02-06 ASSESSMENT — COPD QUESTIONNAIRES
COPD SCREENING SCORE: 0
HAVE YOU SMOKED AT LEAST 100 CIGARETTES IN YOUR ENTIRE LIFE: NO/DON'T KNOW
DURING THE PAST 4 WEEKS HOW MUCH DID YOU FEEL SHORT OF BREATH: NONE/LITTLE OF THE TIME
DO YOU EVER COUGH UP ANY MUCUS OR PHLEGM?: NO/ONLY WITH OCCASIONAL COLDS OR INFECTIONS

## 2024-02-06 ASSESSMENT — PATIENT HEALTH QUESTIONNAIRE - PHQ9
SUM OF ALL RESPONSES TO PHQ9 QUESTIONS 1 AND 2: 0
1. LITTLE INTEREST OR PLEASURE IN DOING THINGS: NOT AT ALL
2. FEELING DOWN, DEPRESSED, IRRITABLE, OR HOPELESS: NOT AT ALL

## 2024-02-06 ASSESSMENT — FIBROSIS 4 INDEX: FIB4 SCORE: 0.3

## 2024-02-06 NOTE — ED PROVIDER NOTES
"ED Provider Note    CHIEF COMPLAINT  Chief Complaint   Patient presents with    Low Back Pain     Denies any injuries. Has pain around her tail bone. Started Saturday. Went to [place] on Sunday. Was told maybe she had a cyst.        EXTERNAL RECORDS REVIEWED  Other none    HPI/ROS  LIMITATION TO HISTORY   Select: : None    OUTSIDE HISTORIAN(S):  Parent adoptive mom is at the bedside    Ani Archuleta is a 18 y.o. female who presents complaining of low back/sacral pain.  Pain started on Friday night into Saturday.  Patient states this pain has been increasingly getting worse.  It hurts to sit, stand, lay on her stomach.  She was seen at the ER in [place] on Sunday and was given antibiotics.  They thought maybe she had an early cyst but did not see fluid on ultrasound that they performed.    Patient admits to several episodes of diarrhea on a daily basis \"for her entire life.\"    Patient denies history of diabetes, fever, chills, urinary symptoms, trauma, weakness, numbness in the lower extremities, incontinence.    PAST MEDICAL HISTORY     Chronic diarrhea    SURGICAL HISTORY  patient denies any surgical history    FAMILY HISTORY  Family History   Adopted: Yes   Family history unknown: Yes       SOCIAL HISTORY  Social History     Tobacco Use    Smoking status: Never    Smokeless tobacco: Never   Vaping Use    Vaping Use: Never used   Substance and Sexual Activity    Alcohol use: Not Currently     Comment: Admits to occasional drinking    Drug use: Never    Sexual activity: Yes     Partners: Male     Birth control/protection: Condom Male       CURRENT MEDICATIONS  Home Medications       Reviewed by Susie Morton (Pharmacy Tech) on 02/06/24 at 1714  Med List Status: Complete     Medication Last Dose Status   doxycycline (VIBRAMYCIN) 100 MG Cap 2/5/2024 Active   levonorgestrel (MIRENA, 52 MG,) 20 MCG/DAY IUD IMPLANT Active   lidocaine (XYLOCAINE) 2 % Solution NEW RX Active   sulfamethoxazole-trimethoprim " "(BACTRIM DS) 800-160 MG tablet > 3 WEEKS AGO Active                    ALLERGIES  No Known Allergies    PHYSICAL EXAM  VITAL SIGNS: BP (!) 134/95   Pulse 75   Temp 36.6 °C (97.9 °F) (Temporal)   Resp 12   Ht 1.702 m (5' 7\")   Wt 116 kg (255 lb 11.7 oz)   SpO2 99%   BMI 40.05 kg/m²    General:  WD female with elevated BMI, nontoxic but uncomfortable appearing; A+Ox3; V/S as above; afebrile, not tachy or hypotensive  Skin: warm and dry; good color; no rash  HEENT: NCAT; EOMs intact; PERRL; no scleral icterus   Neck: FROM  Cardiovascular: Regular heart rate and rhythm.  No murmurs, rubs, or gallops; pulses 2+ bilaterally radially and DP areas  Lungs: No respiratory distress or tachypnea; Clear to auscultation with good air movement bilaterally.  No wheezes, rhonchi, or rales.   Back: No midline spinal tenderness or step-offs; tender to palpation over the gluteal cleft without induration, erythema, crepitus; no perirectal lesions noted  Extremities: LANGFORD x 4; no e/o trauma; no pedal edema; neg Stephanie's  Neurologic: CNs III-XII grossly intact; speech clear; distal sensation intact; strength 5/5 UE/LEs; appears uncomfortable with walking to the room in the hallway  Psychiatric: Appropriate affect, normal mood      DIAGNOSTIC STUDIES / PROCEDURES  LABS  Results for orders placed or performed during the hospital encounter of 02/06/24   CBC WITH DIFFERENTIAL   Result Value Ref Range    WBC 13.9 (H) 4.8 - 10.8 K/uL    RBC 5.65 (H) 4.20 - 5.40 M/uL    Hemoglobin 16.4 (H) 12.0 - 16.0 g/dL    Hematocrit 48.9 (H) 37.0 - 47.0 %    MCV 86.5 81.4 - 97.8 fL    MCH 29.0 27.0 - 33.0 pg    MCHC 33.5 32.2 - 35.5 g/dL    RDW 39.9 35.9 - 50.0 fL    Platelet Count 285 164 - 446 K/uL    MPV 9.4 9.0 - 12.9 fL    Neutrophils-Polys 71.60 44.00 - 72.00 %    Lymphocytes 20.40 (L) 22.00 - 41.00 %    Monocytes 7.20 0.00 - 13.40 %    Eosinophils 0.30 0.00 - 6.90 %    Basophils 0.10 0.00 - 1.80 %    Immature Granulocytes 0.40 0.00 - 0.90 %    " Nucleated RBC 0.00 0.00 - 0.20 /100 WBC    Neutrophils (Absolute) 9.96 (H) 1.82 - 7.42 K/uL    Lymphs (Absolute) 2.85 1.00 - 4.80 K/uL    Monos (Absolute) 1.01 (H) 0.00 - 0.85 K/uL    Eos (Absolute) 0.04 0.00 - 0.51 K/uL    Baso (Absolute) 0.02 0.00 - 0.12 K/uL    Immature Granulocytes (abs) 0.06 0.00 - 0.11 K/uL    NRBC (Absolute) 0.00 K/uL   CMP   Result Value Ref Range    Sodium 139 135 - 145 mmol/L    Potassium 4.1 3.6 - 5.5 mmol/L    Chloride 106 96 - 112 mmol/L    Co2 19 (L) 20 - 33 mmol/L    Anion Gap 14.0 7.0 - 16.0    Glucose 110 (H) 65 - 99 mg/dL    Bun 9 8 - 22 mg/dL    Creatinine 0.66 0.50 - 1.40 mg/dL    Calcium 9.3 8.5 - 10.5 mg/dL    Correct Calcium 9.1 8.5 - 10.5 mg/dL    AST(SGOT) 11 (L) 12 - 45 U/L    ALT(SGPT) 17 2 - 50 U/L    Alkaline Phosphatase 116 (H) 30 - 99 U/L    Total Bilirubin 0.5 0.1 - 1.5 mg/dL    Albumin 4.3 3.2 - 4.9 g/dL    Total Protein 7.5 6.0 - 8.2 g/dL    Globulin 3.2 1.9 - 3.5 g/dL    A-G Ratio 1.3 g/dL   Sed Rate   Result Value Ref Range    Sed Rate Westergren 5 0 - 25 mm/hour   CRP QUANTITIVE (NON-CARDIAC)   Result Value Ref Range    Stat C-Reactive Protein 3.17 (H) 0.00 - 0.75 mg/dL   BETA-HCG QUALITATIVE SERUM   Result Value Ref Range    Beta-Hcg Qualitative Serum Negative Negative   URINALYSIS    Specimen: Urine   Result Value Ref Range    Color Yellow     Character Cloudy (A)     Specific Gravity >=1.030 <1.035    Ph 6.0 5.0 - 8.0    Glucose Negative Negative mg/dL    Ketones Negative Negative mg/dL    Protein Negative Negative mg/dL    Bilirubin Negative Negative    Urobilinogen, Urine 0.2 Negative    Nitrite Negative Negative    Leukocyte Esterase Moderate (A) Negative    Occult Blood Small (A) Negative    Micro Urine Req Microscopic    ESTIMATED GFR   Result Value Ref Range    GFR (CKD-EPI) 129 >60 mL/min/1.73 m 2   URINE MICROSCOPIC (W/UA)   Result Value Ref Range    WBC 20-50 (A) /hpf    RBC 2-5 (A) /hpf    Bacteria Moderate (A) None /hpf    Epithelial Cells Moderate  (A) /hpf    Hyaline Cast 0-2 /lpf         RADIOLOGY  Radiologist interpretation:   CT-ABDOMEN-PELVIS WITH   Final Result      1.  2.2 x 3.2 cm well-circumscribed ovoid cystic structure in the intergluteal region consistent with pilonidal sinus. There is mild stranding in the adjacent subcutaneous soft tissues which could be consistent with infected sinus with associated    cellulitis in the appropriate clinical setting.   2.  Complete situs inversus with dextrocardia.   3.  Intrauterine device.               COURSE & MEDICAL DECISION MAKING    ED Observation Status? No; Patient does not meet criteria for ED Observation.     INITIAL ASSESSMENT, COURSE AND PLAN  Care Narrative: This is a 18-year-old who appears very uncomfortable and complains of lower sacral/gluteal cleft pain.  This has been worsening for the last several days.  She has been on oral antibiotics without relief.  She is requiring parenteral pain medication here in the ER and is unable to walk well secondary to the pain.  She has no neurologic symptoms to suggest epidural abscess.  I did obtain a CT abdomen/pelvis with contrast to delineate a deep abscess or soft tissue infection.  Patient has chronic diarrhea and I thought perhaps she might have a complication of inflammatory bowel disease.    Patient given morphine, Toradol, Dilaudid.    CT demonstrates a 2.2 x 3.2 cm well-circumscribed cystic structure consistent with a pilonidal sinus with mild stranding which may indicate infection.  Given the patient's leukocytosis and pain, I suspect it is infected.    I discussed the case with Dr. Chapman from general surgery.  He states that Dr. Gomez is coming on shift shortly and should be consulted.    5:50 PM  I discussed the case with Dr. Gomez from general surgery who will take the patient to the OR.  Patient has been n.p.o. since last night with the exception of oral contrast for CT imaging today.  He requests that the hospitalist admit the  patient.    Patient and mother advised of plan and they are amenable to this.    Dr. Saldaña aware of admission.      DISPOSITION AND DISCUSSIONS  I have discussed management of the patient with the following physicians and ANDREEA's:    Adela Saldaña    FINAL DIAGNOSIS  1. Sacral pain    2. Pilonidal cyst with abscess      Electronically signed by: Anna Marie Bland M.D., 2/6/2024 12:16 PM

## 2024-02-06 NOTE — ED TRIAGE NOTES
"Chief Complaint   Patient presents with    Low Back Pain     Denies any injuries. Has pain around her tail bone. Started Saturday. Went to [place] on Sunday. Was told maybe she had a cyst.      Ambulatory to triage for above. No distress noted. Denies any obvious wounds/swelling to tailbone.     BP (!) 134/95   Pulse 75   Temp 36.6 °C (97.9 °F) (Temporal)   Resp 12   Ht 1.702 m (5' 7\")   Wt 116 kg (255 lb 11.7 oz)   SpO2 99%   BMI 40.05 kg/m²     "

## 2024-02-07 PROBLEM — Z78.9 NO CONTRAINDICATION TO DEEP VEIN THROMBOSIS (DVT) PROPHYLAXIS: Status: ACTIVE | Noted: 2024-02-07

## 2024-02-07 PROBLEM — Z78.9 NO CONTRAINDICATION TO DEEP VEIN THROMBOSIS (DVT) PROPHYLAXIS: Chronic | Status: ACTIVE | Noted: 2024-02-07

## 2024-02-07 LAB
ANION GAP SERPL CALC-SCNC: 10 MMOL/L (ref 7–16)
BASOPHILS # BLD AUTO: 0.2 % (ref 0–1.8)
BASOPHILS # BLD: 0.02 K/UL (ref 0–0.12)
BUN SERPL-MCNC: 9 MG/DL (ref 8–22)
CALCIUM SERPL-MCNC: 8.8 MG/DL (ref 8.5–10.5)
CHLORIDE SERPL-SCNC: 105 MMOL/L (ref 96–112)
CO2 SERPL-SCNC: 22 MMOL/L (ref 20–33)
CREAT SERPL-MCNC: 0.6 MG/DL (ref 0.5–1.4)
EOSINOPHIL # BLD AUTO: 0.07 K/UL (ref 0–0.51)
EOSINOPHIL NFR BLD: 0.7 % (ref 0–6.9)
ERYTHROCYTE [DISTWIDTH] IN BLOOD BY AUTOMATED COUNT: 40.5 FL (ref 35.9–50)
FUNGUS SPEC CULT: NORMAL
FUNGUS SPEC FUNGUS STN: NORMAL
FUNGUS SPEC FUNGUS STN: NORMAL
GFR SERPLBLD CREATININE-BSD FMLA CKD-EPI: 132 ML/MIN/1.73 M 2
GLUCOSE BLD STRIP.AUTO-MCNC: 84 MG/DL (ref 65–99)
GLUCOSE SERPL-MCNC: 95 MG/DL (ref 65–99)
GRAM STN SPEC: NORMAL
HCT VFR BLD AUTO: 43.6 % (ref 37–47)
HGB BLD-MCNC: 14.5 G/DL (ref 12–16)
IMM GRANULOCYTES # BLD AUTO: 0.03 K/UL (ref 0–0.11)
IMM GRANULOCYTES NFR BLD AUTO: 0.3 % (ref 0–0.9)
LYMPHOCYTES # BLD AUTO: 2.92 K/UL (ref 1–4.8)
LYMPHOCYTES NFR BLD: 30.3 % (ref 22–41)
MCH RBC QN AUTO: 28.9 PG (ref 27–33)
MCHC RBC AUTO-ENTMCNC: 33.3 G/DL (ref 32.2–35.5)
MCV RBC AUTO: 86.9 FL (ref 81.4–97.8)
MONOCYTES # BLD AUTO: 0.89 K/UL (ref 0–0.85)
MONOCYTES NFR BLD AUTO: 9.2 % (ref 0–13.4)
NEUTROPHILS # BLD AUTO: 5.7 K/UL (ref 1.82–7.42)
NEUTROPHILS NFR BLD: 59.3 % (ref 44–72)
NRBC # BLD AUTO: 0 K/UL
NRBC BLD-RTO: 0 /100 WBC (ref 0–0.2)
PLATELET # BLD AUTO: 243 K/UL (ref 164–446)
PMV BLD AUTO: 9.5 FL (ref 9–12.9)
POTASSIUM SERPL-SCNC: 4.1 MMOL/L (ref 3.6–5.5)
RBC # BLD AUTO: 5.02 M/UL (ref 4.2–5.4)
SIGNIFICANT IND 70042: NORMAL
SITE SITE: NORMAL
SODIUM SERPL-SCNC: 137 MMOL/L (ref 135–145)
SOURCE SOURCE: NORMAL
WBC # BLD AUTO: 9.6 K/UL (ref 4.8–10.8)

## 2024-02-07 PROCEDURE — 80048 BASIC METABOLIC PNL TOTAL CA: CPT

## 2024-02-07 PROCEDURE — 306591 TRAY SUTURE REMOVAL DISP: Performed by: SURGERY

## 2024-02-07 PROCEDURE — 302043 TAPE, HYPAFIX: Performed by: SURGERY

## 2024-02-07 PROCEDURE — 93005 ELECTROCARDIOGRAM TRACING: CPT | Performed by: NURSE PRACTITIONER

## 2024-02-07 PROCEDURE — 770001 HCHG ROOM/CARE - MED/SURG/GYN PRIV*

## 2024-02-07 PROCEDURE — 700102 HCHG RX REV CODE 250 W/ 637 OVERRIDE(OP)

## 2024-02-07 PROCEDURE — 302098 PASTE RING (FLAT): Performed by: SURGERY

## 2024-02-07 PROCEDURE — 97605 NEG PRS WND THER DME<=50SQCM: CPT

## 2024-02-07 PROCEDURE — 700111 HCHG RX REV CODE 636 W/ 250 OVERRIDE (IP)

## 2024-02-07 PROCEDURE — 36415 COLL VENOUS BLD VENIPUNCTURE: CPT

## 2024-02-07 PROCEDURE — A9270 NON-COVERED ITEM OR SERVICE: HCPCS

## 2024-02-07 PROCEDURE — 85025 COMPLETE CBC W/AUTO DIFF WBC: CPT

## 2024-02-07 PROCEDURE — 99024 POSTOP FOLLOW-UP VISIT: CPT | Performed by: NURSE PRACTITIONER

## 2024-02-07 PROCEDURE — 82962 GLUCOSE BLOOD TEST: CPT

## 2024-02-07 PROCEDURE — 700101 HCHG RX REV CODE 250: Performed by: NURSE PRACTITIONER

## 2024-02-07 PROCEDURE — 700111 HCHG RX REV CODE 636 W/ 250 OVERRIDE (IP): Performed by: NURSE PRACTITIONER

## 2024-02-07 RX ADMIN — ENOXAPARIN SODIUM 30 MG: 100 INJECTION SUBCUTANEOUS at 17:23

## 2024-02-07 RX ADMIN — CELECOXIB 200 MG: 200 CAPSULE ORAL at 17:23

## 2024-02-07 RX ADMIN — OXYCODONE 10 MG: 5 TABLET ORAL at 13:59

## 2024-02-07 RX ADMIN — POLYETHYLENE GLYCOL 3350 1 PACKET: 17 POWDER, FOR SOLUTION ORAL at 05:33

## 2024-02-07 RX ADMIN — CELECOXIB 200 MG: 200 CAPSULE ORAL at 05:33

## 2024-02-07 RX ADMIN — CEFTRIAXONE SODIUM 2000 MG: 10 INJECTION, POWDER, FOR SOLUTION INTRAVENOUS at 21:29

## 2024-02-07 RX ADMIN — OXYCODONE 10 MG: 5 TABLET ORAL at 05:33

## 2024-02-07 RX ADMIN — ACETAMINOPHEN 1000 MG: 500 TABLET ORAL at 15:08

## 2024-02-07 RX ADMIN — DOCUSATE SODIUM 50 MG AND SENNOSIDES 8.6 MG 1 TABLET: 8.6; 5 TABLET, FILM COATED ORAL at 21:28

## 2024-02-07 RX ADMIN — POLYETHYLENE GLYCOL 3350 1 PACKET: 17 POWDER, FOR SOLUTION ORAL at 17:23

## 2024-02-07 RX ADMIN — DOCUSATE SODIUM 100 MG: 100 CAPSULE, LIQUID FILLED ORAL at 05:33

## 2024-02-07 RX ADMIN — DOCUSATE SODIUM 100 MG: 100 CAPSULE, LIQUID FILLED ORAL at 17:23

## 2024-02-07 RX ADMIN — OXYCODONE 10 MG: 5 TABLET ORAL at 22:42

## 2024-02-07 RX ADMIN — ACETAMINOPHEN 1000 MG: 500 TABLET ORAL at 09:31

## 2024-02-07 RX ADMIN — ACETAMINOPHEN 1000 MG: 500 TABLET ORAL at 02:26

## 2024-02-07 RX ADMIN — ACETAMINOPHEN 1000 MG: 500 TABLET ORAL at 21:28

## 2024-02-07 ASSESSMENT — PAIN DESCRIPTION - PAIN TYPE
TYPE: ACUTE PAIN

## 2024-02-07 ASSESSMENT — COGNITIVE AND FUNCTIONAL STATUS - GENERAL
DAILY ACTIVITIY SCORE: 24
SUGGESTED CMS G CODE MODIFIER MOBILITY: CH
SUGGESTED CMS G CODE MODIFIER DAILY ACTIVITY: CH
MOBILITY SCORE: 24

## 2024-02-07 NOTE — OR NURSING
Updated mother on pt room assignment. Patient resting comfortably in bed, eyes closed, even and unlabored respirations noted, monitoring in place.  Called Jennie GONSALVES, gave report. Pt dressing is clean, dry and intact.  Ice pack in place for comfort.

## 2024-02-07 NOTE — ANESTHESIA TIME REPORT
Anesthesia Start and Stop Event Times       Date Time Event    2/6/2024 1840 Ready for Procedure     1909 Anesthesia Start     2024 Anesthesia Stop          Responsible Staff  02/06/24      Name Role Begin End    Jim Hernandez M.D. Anesth 1909 2024          Overtime Reason:  no overtime (within assigned shift)    Comments:

## 2024-02-07 NOTE — ANESTHESIA PREPROCEDURE EVALUATION
Case: [id number] Date/Time: 02/06/24 1815    Procedure: INCISION AND DRAINAGE    Location: [facility] OR 10 / SURGERY [facility]    Surgeons: Jeremy Gomez M.D.            Relevant Problems   No relevant active problems       Physical Exam    Airway   Mallampati: II  TM distance: >3 FB  Neck ROM: full       Cardiovascular - normal exam  Rhythm: regular  Rate: normal  (-) murmur     Dental - normal exam           Pulmonary - normal exam  Breath sounds clear to auscultation     Abdominal    Neurological - normal exam                   Anesthesia Plan    ASA 3- EMERGENT   ASA physical status 3 criteria: morbid obesity - BMI greater than or equal to 40ASA physical status emergent criteria: acute deteriorating condition due to infection    Plan - general       Airway plan will be ETT    (prone)                Informed Consent:

## 2024-02-07 NOTE — ED NOTES
Med Rec complete per patient with mom at bedside   Allergies reviewed  Antibiotics in the past 30 days:yes  Anticoagulant in past 14 days:no  Pharmacy patient utilizes:[facility] in [place]    Pt states she did not complete her Bactrim antibiotics pt stated she had a couple days remaining until finished when she stopped taking Bactrim.

## 2024-02-07 NOTE — OR SURGEON
"Immediate Post OP Note    PreOp Diagnosis: Infected pilonidal cyst      PostOp Diagnosis: same      Procedure(s):  INCISION AND DRAINAGE OF PILONIDAL ABSCESS - Wound Class: Dirty or Infected    Surgeon(s):  Jeremy Gomez M.D.    Anesthesiologist/Type of Anesthesia:  Anesthesiologist: Jim Hernandez M.D./General    Surgical Staff:  Assistant: Linsey M Wegener, A.P.R.N.  Circulator: Martínez Tuttle R.N.  Scrub Person: Ricky Singh    Specimens removed if any:  ID Type Source Tests Collected by Time Destination   1 : Pilonidal Abscess Other Other AFB CULTURE, FUNGAL CULTURE, AEROBIC/ANAEROBIC CULTURE (SURGERY) Jeremy Gomez M.D. 2/6/2024  7:54 PM        Estimated Blood Loss: < 10 cc    Findings: infected midline pilonidal cyst.  Drained and irrigated   Packed with 1/2 \" Iodoform gauze    Complications: none        2/6/2024 8:16 PM Jeremy Gomez M.D.  "

## 2024-02-07 NOTE — PROGRESS NOTES
Bedside report received, assessment completed    A&O x  4, pt calls appropriately  Mobility: Up with SBA  Fall Risk Assessment: No risk, door notifications in use  Pain Assessment / Reassessment completed, medication provided per MAR  Diet: Regular  LDA:   IV Access: 20 R FA, CDI/ flushed/ SL    GI/: + void, - flatus, PTA BM  DVT Prophylaxis: Lovenox, SCD on    Reviewed plan of care with patient, bed in lowest position and locked, pt resting comfortably now, call light within reach, all needs met at this time. Interventions will be executed per plan of care

## 2024-02-07 NOTE — ASSESSMENT & PLAN NOTE
Buttock / lower back pain x 4 days  No extreral signs  WBC 13.9  CT with intergluteal abscess  2/6 Incision and drainage.    Rocephin, antibiotics x 5 days.  2/8 Transitioned to Augmentin. Cultures with no growth at this time.

## 2024-02-07 NOTE — PROGRESS NOTES
4 Eyes Skin Assessment Completed by Mia RN and Juana RN.    Head WDL  Ears WDL  Nose WDL  Mouth WDL  Neck WDL  Breast/Chest WDL  Shoulder Blades WDL  Spine WDL  (R) Arm/Elbow/Hand WDL  (L) Arm/Elbow/Hand WDL  Abdomen WDL  Groin WDL  Scrotum/Coccyx/Buttocks Surgical incision, DIP  (R) Leg WDL  (L) Leg WDL  (R) Heel/Foot/Toe WDL  (L) Heel/Foot/Toe WDL          Devices In Places Blood Pressure Cuff, Pulse Ox, SCD's, and Nasal Cannula      Interventions In Place Gray Ear Foams and Pillows    Possible Skin Injury No    Pictures Uploaded Into Epic N/A  Wound Consult Placed Yes  RN Wound Prevention Protocol Ordered No

## 2024-02-07 NOTE — CARE PLAN
The patient is Stable - Low risk of patient condition declining or worsening    Shift Goals  Clinical Goals: up to chair for meals, ambulate webster x3 100ft, void  Patient Goals: pain control, discharge    Progress made toward(s) clinical / shift goals:  plan of care discussed with patient, patient ambulates frequently in room, patient voided, pain controlled with medications    Patient is not progressing towards the following goals:      Problem: Pain - Standard  Goal: Alleviation of pain or a reduction in pain to the patient’s comfort goal  Outcome: Progressing     Problem: Fall Risk  Goal: Patient will remain free from falls  Outcome: Progressing     Problem: Knowledge Deficit - Standard  Goal: Patient and family/care givers will demonstrate understanding of plan of care, disease process/condition, diagnostic tests and medications  Outcome: Progressing

## 2024-02-07 NOTE — ANESTHESIA POSTPROCEDURE EVALUATION
Patient: Ani Archuleta    Procedure Summary       Date: 02/06/24 Room / Location: [place] [id number] / SURGERY [facility]    Anesthesia Start: 1909 Anesthesia Stop: 2024    Procedure: INCISION AND DRAINAGE OF PILONIDAL ABSCESS (Buttocks) Diagnosis: (Sacrococcygeal Pilonidal Cyst with Abscess)    Surgeons: Jeremy Gomez M.D. Responsible Provider: Jim Hernandez M.D.    Anesthesia Type: general ASA Status: 3 - Emergent            Final Anesthesia Type: general  Last vitals  BP   Blood Pressure: 138/76    Temp   36.4 °C (97.5 °F)    Pulse   (!) 112   Resp   (!) 29    SpO2   98 %      Anesthesia Post Evaluation    Patient location during evaluation: PACU  Patient participation: complete - patient participated  Level of consciousness: awake and alert  Pain score: 0    Airway patency: patent  Anesthetic complications: no  Cardiovascular status: hemodynamically stable  Respiratory status: acceptable  Hydration status: euvolemic    PONV: none          No notable events documented.     Nurse Pain Score: 9 (NPRS)

## 2024-02-07 NOTE — OR NURSING
Called mother, Radha, to update on plan of care and patient status. Patient resting comfortably in bed, eyes closed, even and unlabored respirations noted, monitoring in place.

## 2024-02-07 NOTE — PROGRESS NOTES
"  DATE: 2/7/2024    Post Operative Day  1 Incision and drainage of pilonidal abscess.     INTERVAL EVENTS:  Pain is controlled  Wound consult placed and start dressing changes today    -On Rocephin, cultures pending   -Lovenox for DVT prophylaxis  -Ambulate     PHYSICAL EXAMINATION:  Vital Signs: /80   Pulse 90   Temp 36.7 °C (98.1 °F) (Temporal)   Resp 17   Ht 1.702 m (5' 7\")   Wt 116 kg (255 lb 11.7 oz)   SpO2 97%     Physical Exam  Vitals and nursing note reviewed.   Constitutional:       Appearance: Normal appearance. She is obese.   HENT:      Head: Normocephalic.      Mouth/Throat:      Mouth: Mucous membranes are moist.   Cardiovascular:      Rate and Rhythm: Normal rate and regular rhythm.   Abdominal:      General: Abdomen is flat. There is no distension.      Comments: Dressing in place   Musculoskeletal:         General: Normal range of motion.      Cervical back: Normal range of motion.   Skin:     General: Skin is warm and dry.      Capillary Refill: Capillary refill takes less than 2 seconds.   Neurological:      General: No focal deficit present.      Mental Status: She is alert and oriented to person, place, and time.   Psychiatric:         Mood and Affect: Mood normal.         Behavior: Behavior normal.           LABORATORY VALUES:  Recent Labs     02/06/24  1309 02/07/24  0811   WBC 13.9* 9.6   RBC 5.65* 5.02   HEMOGLOBIN 16.4* 14.5   HEMATOCRIT 48.9* 43.6   MCV 86.5 86.9   MCH 29.0 28.9   MCHC 33.5 33.3   RDW 39.9 40.5   PLATELETCT 285 243   MPV 9.4 9.5     Recent Labs     02/06/24  1309 02/07/24  0811   SODIUM 139 137   POTASSIUM 4.1 4.1   CHLORIDE 106 105   CO2 19* 22   GLUCOSE 110* 95   BUN 9 9   CREATININE 0.66 0.60   CALCIUM 9.3 8.8     Recent Labs     02/06/24  1309   ASTSGOT 11*   ALTSGPT 17   TBILIRUBIN 0.5   ALKPHOSPHAT 116*   GLOBULIN 3.2            IMAGING:  CT-ABDOMEN-PELVIS WITH   Final Result      1.  2.2 x 3.2 cm well-circumscribed ovoid cystic structure in the " intergluteal region consistent with pilonidal sinus. There is mild stranding in the adjacent subcutaneous soft tissues which could be consistent with infected sinus with associated    cellulitis in the appropriate clinical setting.   2.  Complete situs inversus with dextrocardia.   3.  Intrauterine device.             ASSESSMENT AND PLAN:  * Sacrococcygeal pilonidal cyst with abscess- (present on admission)  Assessment & Plan  Buttock / lower back pain x 4 days  No extreral signs  WBC 13.9  CT with intergluteal abscess  2/6 Incision and drainage. Cultures pending.  Rocephin, antibiotics x 5 days.    No contraindication to deep vein thrombosis (DVT) prophylaxis  Assessment & Plan  2/6 Lovenox initiated     Morbid obesity with BMI of 40.0-44.9, adult (HCC)- (present on admission)  Assessment & Plan  BMI 40.05           ____________________________________     JAMIL Carlton.    DD: 2/7/2024  10:34 AM

## 2024-02-07 NOTE — OP REPORT
DATE OF SERVICE:  02/06/2024     PREOPERATIVE DIAGNOSIS:  Infected pilonidal cyst.     POSTOPERATIVE DIAGNOSIS:  Infected pilonidal cyst.     PROCEDURE PERFORMED: Incision and drainage of pilonidal abscess.     SURGEON:  Jeremy Gomez MD     ASSISTANT:  Erica TELLEZ     ANESTHESIOLOGIST:  Jim Hernandez MD     ANESTHESIA:  General endotracheal anesthesia, local 30 mL of 0.5% Marcaine   with epinephrine.     SPECIMEN:  Culture.     ESTIMATED BLOOD LOSS:  Less than 10 mL.     INDICATIONS:  The patient is a 18-year-old female who has had increasing   intergluteal pain for several days.  She was seen at an outside facility.    They did not see evidence of an obvious infection.  She was started on   antibiotics.  Despite this, the pain has gotten worse.  She is unable to sit   and difficulty walking.  She does have leukocytosis.  She was given   antibiotics and taken to the OR for drainage.     DESCRIPTION OF PROCEDURE:  With the patient in supine position, general   anesthesia was administered.  She was turned prone, appropriately positioned   and padded and checked by anesthesia as well as OR staff.  Once they were   satisfied with the positioning, the intergluteal area was examined.  There was   no obvious sign of infection, but there was a thickened area on palpation.    The area was prepped with ChloraPrep and widely draped.  Local anesthesia was   infiltrated as a field block, total of 30 mL of Marcaine 0.5% was used. A   scalpel was then used over the thickened area in the midline and on first   pass, there was found to have pus under pressure with foul smelling.  Cultures   were obtained.  The pus was evacuated.  Again, there was an ellipse of skin   that was resected to allow better access. Wound was irrigated.  Cautery was   used for multiple small sites of bleeding.  Again was irrigated, debrided.    Once there was good hemostasis, it was packed with half-inch iodoform gauze.    Surrounding  tissues were cleaned and dried.  Gauze was placed over this and   secured with tape.  The patient was turned supine, extubated by anesthesia and   plan to be admitted to floor.        ______________________________  MD KIKE WARNER/NAY/MAYO    DD:  02/06/2024 22:04  DT:  02/06/2024 22:20    Job#:  [id number]

## 2024-02-07 NOTE — CARE PLAN
The patient is Stable - Low risk of patient condition declining or worsening    Shift Goals  Clinical Goals: [place] to unit, pain control, and rest  Patient Goals: pain control and rest    Progress made toward(s) clinical / shift goals:  Pt oriented to unit and verbalized understanding on use of call light and POC. Pain controlled per MAR. Pt slept intermittently throughout shift.     Problem: Pain - Standard  Goal: Alleviation of pain or a reduction in pain to the patient’s comfort goal  Outcome: Progressing     Problem: Knowledge Deficit - Standard  Goal: Patient and family/care givers will demonstrate understanding of plan of care, disease process/condition, diagnostic tests and medications  Outcome: Progressing

## 2024-02-07 NOTE — ANESTHESIA PROCEDURE NOTES
Airway    Date/Time: 2/6/2024 7:26 PM    Performed by: Jim Hernandez M.D.  Authorized by: Jim Hernandez M.D.    Location:  OR  Urgency:  Elective  Indications for Airway Management:  Anesthesia      Spontaneous Ventilation: absent    Sedation Level:  Deep  Preoxygenated: Yes    Patient Position:  Sniffing  Mask Difficulty Assessment:  1 - vent by mask  Final Airway Type:  Endotracheal airway  Final Endotracheal Airway:  ETT  Cuffed: Yes    Technique Used for Successful ETT Placement:  Flexible bronchoscopy    Insertion Site:  Oral  ETT Size (mm):  6.5  Measured from:  Teeth  ETT to Teeth (cm):  22  Placement Verified by: auscultation and capnometry    Number of Attempts at Approach:  1   Elective bronchoscope use

## 2024-02-07 NOTE — CONSULTS
DATE OF CONSULTATION:  2/6/2024     REFERRING PHYSICIAN:   Adenike Bland M.D.     CONSULTING PHYSICIAN:  Jeremy Gomez M.D.     REASON FOR CONSULTATION:  I have been asked by  to see the patient in surgical consultation for evaluation of intergluteal abscess.    HISTORY OF PRESENT ILLNESS: The patient is a 18 year-old White young woman who presents to the Emergency Department with a 4-day history of moderate pian between her butt cheeks. The pain is associated with  difficulty walking . She admits a history of chronic diarrhea. The patient denies any history of previous abdominal surgery. The patient denies any previous surgery for obstructive symptoms.  No history of similar episodes.       PAST MEDICAL HISTORY:  has no past medical history of Asthma or Diabetes (HCC).    PAST SURGICAL HISTORY:  has no past surgical history on file.    ALLERGIES: No Known Allergies    CURRENT MEDICATIONS:    Home Medications       Reviewed by Susie Morton (Pharmacy Tech) on 02/06/24 at 1714  Med List Status: Complete     Medication Last Dose Status   doxycycline (VIBRAMYCIN) 100 MG Cap 2/5/2024 Active   levonorgestrel (MIRENA, 52 MG,) 20 MCG/DAY IUD IMPLANT Active   lidocaine (XYLOCAINE) 2 % Solution NEW RX Active   sulfamethoxazole-trimethoprim (BACTRIM DS) 800-160 MG tablet > 3 WEEKS AGO Active                    FAMILY HISTORY: She was adopted. Family history is unknown by patient.    SOCIAL HISTORY:  reports that she has never smoked. She has never used smokeless tobacco. She reports that she does not currently use alcohol. She reports that she does not use drugs.    REVIEW OF SYSTEMS: Comprehensive review of systems is negative with the exception of the aforementioned HPI, PMH, and PSH bullets in accordance with CMS guidelines.    PHYSICAL EXAMINATION:    Physical Exam  Vitals and nursing note reviewed.   Constitutional:       Appearance: Normal appearance. She is obese.   HENT:      Head:  Normocephalic.      Nose: Nose normal.      Mouth/Throat:      Mouth: Mucous membranes are moist.   Eyes:      Pupils: Pupils are equal, round, and reactive to light.   Cardiovascular:      Rate and Rhythm: Normal rate and regular rhythm.   Abdominal:      General: Abdomen is flat. Bowel sounds are normal. There is no distension.      Palpations: Abdomen is soft.   Musculoskeletal:         General: Normal range of motion.      Cervical back: Normal range of motion.   Skin:     General: Skin is warm and dry.      Capillary Refill: Capillary refill takes less than 2 seconds.   Neurological:      General: No focal deficit present.      Mental Status: She is alert and oriented to person, place, and time.         LABORATORY VALUES:   Recent Labs     02/06/24  1309   WBC 13.9*   RBC 5.65*   HEMOGLOBIN 16.4*   HEMATOCRIT 48.9*   MCV 86.5   MCH 29.0   MCHC 33.5   RDW 39.9   PLATELETCT 285   MPV 9.4     Recent Labs     02/06/24  1309   SODIUM 139   POTASSIUM 4.1   CHLORIDE 106   CO2 19*   GLUCOSE 110*   BUN 9   CREATININE 0.66   CALCIUM 9.3     Recent Labs     02/06/24  1309   ASTSGOT 11*   ALTSGPT 17   TBILIRUBIN 0.5   ALKPHOSPHAT 116*   GLOBULIN 3.2            IMAGING:   CT-ABDOMEN-PELVIS WITH   Final Result      1.  2.2 x 3.2 cm well-circumscribed ovoid cystic structure in the intergluteal region consistent with pilonidal sinus. There is mild stranding in the adjacent subcutaneous soft tissues which could be consistent with infected sinus with associated    cellulitis in the appropriate clinical setting.   2.  Complete situs inversus with dextrocardia.   3.  Intrauterine device.             ASSESSMENT AND PLAN:     * Sacrococcygeal pilonidal cyst with abscess- (present on admission)  Assessment & Plan  Buttock / lower back pain x 4 days  No extreral signs  WBC 13.9  CT with intergluteal abscess  To OR for drainage    Morbid obesity with BMI of 40.0-44.9, adult (HCC)- (present on admission)  Assessment & Plan  BMI  40.05        DISPOSITION: Admit [facility] Surgery [place] Service.         ____________________________________     Jeremy Gomez M.D.    DD: 2/6/2024  5:53 PM    AAST Grading System for EGS Conditions  ACS NSQIP Surgical Risk Calculator

## 2024-02-07 NOTE — PROGRESS NOTES
Assumed care of patient at 0700. Patient is alert and oriented, respirations are unlabored and regular on room air. Patient sitting up in bed at this time, ambulated to bathroom, brushed teeth, voided without difficulty. Lung sounds clear throughout. Abdomen soft, non tender, +bowel sounds, BM PTA, +flatus. Incision to sacrum, ANDREW, gauze/hypafix dressing in place, serosanguineous drainage noted. Pain stated at 3, appropriate pain medication administered. Bed in lowest position, call light within reach, patient states no needs at this time.

## 2024-02-08 LAB
ANION GAP SERPL CALC-SCNC: 10 MMOL/L (ref 7–16)
BACTERIA SPEC ANAEROBE CULT: NORMAL
BASOPHILS # BLD AUTO: 0.3 % (ref 0–1.8)
BASOPHILS # BLD: 0.02 K/UL (ref 0–0.12)
BUN SERPL-MCNC: 10 MG/DL (ref 8–22)
CALCIUM SERPL-MCNC: 8.9 MG/DL (ref 8.5–10.5)
CHLORIDE SERPL-SCNC: 105 MMOL/L (ref 96–112)
CO2 SERPL-SCNC: 22 MMOL/L (ref 20–33)
CREAT SERPL-MCNC: 0.57 MG/DL (ref 0.5–1.4)
EKG IMPRESSION: NORMAL
EOSINOPHIL # BLD AUTO: 0.11 K/UL (ref 0–0.51)
EOSINOPHIL NFR BLD: 1.7 % (ref 0–6.9)
ERYTHROCYTE [DISTWIDTH] IN BLOOD BY AUTOMATED COUNT: 39.5 FL (ref 35.9–50)
GFR SERPLBLD CREATININE-BSD FMLA CKD-EPI: 134 ML/MIN/1.73 M 2
GLUCOSE SERPL-MCNC: 95 MG/DL (ref 65–99)
HCT VFR BLD AUTO: 41.5 % (ref 37–47)
HGB BLD-MCNC: 14.1 G/DL (ref 12–16)
IMM GRANULOCYTES # BLD AUTO: 0.02 K/UL (ref 0–0.11)
IMM GRANULOCYTES NFR BLD AUTO: 0.3 % (ref 0–0.9)
LYMPHOCYTES # BLD AUTO: 2.83 K/UL (ref 1–4.8)
LYMPHOCYTES NFR BLD: 43.3 % (ref 22–41)
MCH RBC QN AUTO: 29 PG (ref 27–33)
MCHC RBC AUTO-ENTMCNC: 34 G/DL (ref 32.2–35.5)
MCV RBC AUTO: 85.2 FL (ref 81.4–97.8)
MONOCYTES # BLD AUTO: 0.58 K/UL (ref 0–0.85)
MONOCYTES NFR BLD AUTO: 8.9 % (ref 0–13.4)
NEUTROPHILS # BLD AUTO: 2.97 K/UL (ref 1.82–7.42)
NEUTROPHILS NFR BLD: 45.5 % (ref 44–72)
NRBC # BLD AUTO: 0 K/UL
NRBC BLD-RTO: 0 /100 WBC (ref 0–0.2)
PLATELET # BLD AUTO: 254 K/UL (ref 164–446)
PMV BLD AUTO: 9.3 FL (ref 9–12.9)
POTASSIUM SERPL-SCNC: 3.9 MMOL/L (ref 3.6–5.5)
RBC # BLD AUTO: 4.87 M/UL (ref 4.2–5.4)
SIGNIFICANT IND 70042: NORMAL
SITE SITE: NORMAL
SODIUM SERPL-SCNC: 137 MMOL/L (ref 135–145)
SOURCE SOURCE: NORMAL
WBC # BLD AUTO: 6.5 K/UL (ref 4.8–10.8)

## 2024-02-08 PROCEDURE — 700102 HCHG RX REV CODE 250 W/ 637 OVERRIDE(OP)

## 2024-02-08 PROCEDURE — A9270 NON-COVERED ITEM OR SERVICE: HCPCS

## 2024-02-08 PROCEDURE — 770001 HCHG ROOM/CARE - MED/SURG/GYN PRIV*

## 2024-02-08 PROCEDURE — 36415 COLL VENOUS BLD VENIPUNCTURE: CPT

## 2024-02-08 PROCEDURE — 93010 ELECTROCARDIOGRAM REPORT: CPT | Performed by: INTERNAL MEDICINE

## 2024-02-08 PROCEDURE — 99024 POSTOP FOLLOW-UP VISIT: CPT | Performed by: NURSE PRACTITIONER

## 2024-02-08 PROCEDURE — 85025 COMPLETE CBC W/AUTO DIFF WBC: CPT

## 2024-02-08 PROCEDURE — 80048 BASIC METABOLIC PNL TOTAL CA: CPT

## 2024-02-08 PROCEDURE — 700111 HCHG RX REV CODE 636 W/ 250 OVERRIDE (IP)

## 2024-02-08 PROCEDURE — 700102 HCHG RX REV CODE 250 W/ 637 OVERRIDE(OP): Performed by: NURSE PRACTITIONER

## 2024-02-08 PROCEDURE — A9270 NON-COVERED ITEM OR SERVICE: HCPCS | Performed by: NURSE PRACTITIONER

## 2024-02-08 RX ORDER — LIDOCAINE HYDROCHLORIDE 20 MG/ML
20 INJECTION, SOLUTION INFILTRATION; PERINEURAL
Status: DISCONTINUED | OUTPATIENT
Start: 2024-02-08 | End: 2024-02-12 | Stop reason: HOSPADM

## 2024-02-08 RX ORDER — LIDOCAINE HYDROCHLORIDE 40 MG/ML
SOLUTION TOPICAL
Status: DISCONTINUED | OUTPATIENT
Start: 2024-02-08 | End: 2024-02-12 | Stop reason: HOSPADM

## 2024-02-08 RX ORDER — AMOXICILLIN AND CLAVULANATE POTASSIUM 875; 125 MG/1; MG/1
1 TABLET, FILM COATED ORAL EVERY 8 HOURS
Status: COMPLETED | OUTPATIENT
Start: 2024-02-08 | End: 2024-02-10

## 2024-02-08 RX ADMIN — ACETAMINOPHEN 1000 MG: 500 TABLET ORAL at 02:58

## 2024-02-08 RX ADMIN — OXYCODONE 5 MG: 5 TABLET ORAL at 14:04

## 2024-02-08 RX ADMIN — DOCUSATE SODIUM 100 MG: 100 CAPSULE, LIQUID FILLED ORAL at 16:40

## 2024-02-08 RX ADMIN — ENOXAPARIN SODIUM 30 MG: 100 INJECTION SUBCUTANEOUS at 04:18

## 2024-02-08 RX ADMIN — ENOXAPARIN SODIUM 30 MG: 100 INJECTION SUBCUTANEOUS at 16:41

## 2024-02-08 RX ADMIN — DOCUSATE SODIUM 100 MG: 100 CAPSULE, LIQUID FILLED ORAL at 04:18

## 2024-02-08 RX ADMIN — ACETAMINOPHEN 1000 MG: 500 TABLET ORAL at 21:10

## 2024-02-08 RX ADMIN — OXYCODONE 5 MG: 5 TABLET ORAL at 09:12

## 2024-02-08 RX ADMIN — CELECOXIB 200 MG: 200 CAPSULE ORAL at 16:40

## 2024-02-08 RX ADMIN — AMOXICILLIN AND CLAVULANATE POTASSIUM 1 TABLET: 875; 125 TABLET, FILM COATED ORAL at 21:10

## 2024-02-08 RX ADMIN — AMOXICILLIN AND CLAVULANATE POTASSIUM 1 TABLET: 875; 125 TABLET, FILM COATED ORAL at 14:43

## 2024-02-08 RX ADMIN — ACETAMINOPHEN 1000 MG: 500 TABLET ORAL at 09:12

## 2024-02-08 RX ADMIN — CELECOXIB 200 MG: 200 CAPSULE ORAL at 04:18

## 2024-02-08 ASSESSMENT — PAIN DESCRIPTION - PAIN TYPE
TYPE: ACUTE PAIN

## 2024-02-08 NOTE — DISCHARGE PLANNING
Case Management Discharge Planning    Admission Date: 2/6/2024  GMLOS: 3.2  ALOS: 2    6-Clicks ADL Score: 24  6-Clicks Mobility Score: 24      Anticipated Discharge Dispo: Discharge Disposition: Discharged to home/self care (01)  Discharge Address:  ([address]  [place] [address])  Discharge Contact Phone Number: [phone]    DME Needed: Yes    DME Ordered: Yes    Action(s) Taken: Referral(s) sent and Authorization Sent    Completed chart audit prior to rounds and found that patient had a wound vac placed 2/7. Discussed in 8:30am rounds patient has wound vac and will need a wound vac and follow up appointment on discharge.      This CM RN faxed [facility] insurance authorization form wound vac.     Completed CM assessment with patient at bedside verified demographics and insurance coverage.      Discussed pan for wound vac on discharge and wound clinic follow up with patient. Patient verbalized understanding and approval for [facility] wound vac.     Received order for wound clinic out patient and confirmed wound clinic appointment scheduled for 2/14 at 15:00.    Escalations Completed: DME Company    Medically Clear: No    Next Steps: CM RN will call wound clinic to set up follow up appointment.    Follow up with [facility] on acceptance and delivery of wound vac    Barriers to Discharge: Medical clearance, Pending Insurance Authorization, and DME  Pending Medical Clearence  Pending [facility] auth acceptance for wound vac      Is the patient up for discharge tomorrow: No

## 2024-02-08 NOTE — CARE PLAN
The patient is Stable - Low risk of patient condition declining or worsening    Shift Goals  Clinical Goals: monitor wound vac, pain management  Patient Goals: rest  Family Goals: n/a    Progress made toward(s) clinical / shift goals:  Patient's pain has been medicated with PRN and scheduled interventions per MAR. Patient is tolerating diet. Wound vac in remains in place and is functioning appropriately. Patient calls appropriately for assistance. Vital signs have remained stable.     Patient is not progressing towards the following goals: Pending DC clearance.    Problem: Pain - Standard  Goal: Alleviation of pain or a reduction in pain to the patient’s comfort goal  Outcome: Progressing     Problem: Fall Risk  Goal: Patient will remain free from falls  Outcome: Progressing     Problem: Knowledge Deficit - Standard  Goal: Patient and family/care givers will demonstrate understanding of plan of care, disease process/condition, diagnostic tests and medications  Outcome: Progressing

## 2024-02-08 NOTE — CARE PLAN
The patient is Stable - Low risk of patient condition declining or worsening    Shift Goals  Clinical Goals: monitor wound vac, pain management  Patient Goals: rest  Family Goals: n/a    Progress made toward(s) clinical / shift goals:  Pt medicated per MAR, resting. Pt report general weakness and dizziness with movement, educated pt to call before ambulating. Pt report chest pain, EKG obtained. Updated pt on POC. Care ongoing.    Patient is not progressing towards the following goals:

## 2024-02-08 NOTE — DISCHARGE PLANNING
Care Transition Team Assessment    Completed chart audit prior to rounds and found that patient had a wound vac placed 2/7. Discussed in 8:30am rounds patient has wound vac and will need a wound vac and follow up appointment on discharge.     This CM RN faxed [facility] insurance authorization form wound vac.    Completed CM assessment with patient at bedside verified demographics and insurance coverage.     Discussed pan for wound vac on discharge and wound clinic follow up with patient. Patient verbalized understanding and approval for [employer] wound vac.       Information Source  Orientation Level: Oriented X4  Information Given By: Patient  Informant's Name: Ani  Who is responsible for making decisions for patient? : Patient    Readmission Evaluation  Is this a readmission?: No    Elopement Risk  Legal Hold: No  Ambulatory or Self Mobile in Wheelchair: Yes  Disoriented: No  Psychiatric Symptoms: None  History of Wandering: No  Elopement this Admit: No  Vocalizing Wanting to Leave: No  Displays Behaviors, Body Language Wanting to Leave: No-Not at Risk for Elopement  Elopement Risk: Not at Risk for Elopement    Interdisciplinary Discharge Planning  Lives with - Patient's Self Care Capacity: Parents  Patient or legal guardian wants to designate a caregiver: No  Support Systems: Friends / Neighbors, Family Member(s), Parent  Housing / Facility: [address]  Durable Medical Equipment: none    Discharge Preparedness  What is your plan after discharge?: Home with help  What are your discharge supports?: Parent  Prior Functional Level: Ambulatory  Difficulity with ADLs: None  Difficulity with IADLs: None    Functional Assesment  Prior Functional Level: Ambulatory    Finances  Financial Barriers to Discharge: No  Prescription Coverage: Yes    Vision / Hearing Impairment  Vision Impairment : No  Hearing Impairment : No    Values / Beliefs / Concerns  Values / Beliefs Concerns : No    Advance Directive  Advance Directive?:  None    Domestic Abuse  Have you ever been the victim of abuse or violence?: No  Physical Abuse or Sexual Abuse: No  Verbal Abuse or Emotional Abuse: No  Possible Abuse/Neglect Reported to:: Not Applicable    Psychological Assessment  History of Substance Abuse: None  History of Psychiatric Problems: No  Non-compliant with Treatment: No         Anticipated Discharge Information  Discharge Disposition: Discharged to home/self care (01)  Discharge Address:  [address] [place] [address] [address] [place] [address]  Discharge Contact Phone Number: [phone]

## 2024-02-08 NOTE — PROGRESS NOTES
This RN notified Janeth TELLEZ of pt chest pain and general weakness. Stat EKG ordered per verbal order.

## 2024-02-08 NOTE — WOUND TEAM
[facility]  Inpatient Services  Initial Wound and Skin Care Evaluation    Admission Date: 2/6/2024     Last order of IP CONSULT TO WOUND CARE was found on 2/6/2024 from Hospital Encounter on 2/6/2024     HPI, PMH, SH: Reviewed    Past Surgical History:   Procedure Laterality Date    INCISION AND DRAINAGE GENERAL  2/6/2024    Procedure: INCISION AND DRAINAGE OF PILONIDAL ABSCESS;  Surgeon: Jeremy Gomez M.D.;  Location: SURGERY [facility];  Service: General     Social History     Tobacco Use    Smoking status: Never    Smokeless tobacco: Never   Substance Use Topics    Alcohol use: Not Currently     Comment: Admits to occasional drinking     Chief Complaint   Patient presents with    Low Back Pain     Denies any injuries. Has pain around her tail bone. Started Saturday. Went to [place] on Sunday. Was told maybe she had a cyst.      Diagnosis: Pilonidal abscess [L05.01]  Pilonidal cyst [L05.91]    Unit where seen by Wound Team: T413/02     WOUND CONSULT RELATED TO:  Coccyx    WOUND TEAM PLAN OF CARE - Frequency of Follow-up:   Nursing to follow dressing orders written for wound care. Contact wound team if area fails to progress, deteriorates or with any questions/concerns if something comes up before next scheduled follow up (See below as to whether wound is following and frequency of wound follow up)   NPWT change 3 times weekly - Coccyx    WOUND HISTORY:   18y.o. female underwent I&D of pilonidal abscess with Dr. Gomez 2/4/24       WOUND ASSESSMENT/LDA  Wound 02/06/24 Incision Buttocks;Coccyx (Active)   Date First Assessed/Time First Assessed: 02/06/24 2003   Primary Wound Type: Incision  Location: Buttocks;Coccyx      Assessments 2/7/2024  4:00 PM   Wound Image     Site Assessment Red;Bleeding   Periwound Assessment Clean;Dry;Intact   Margins Attached edges;Defined edges   Closure Secondary intention   Drainage Amount Moderate   Drainage Description Sanguineous   Treatments  Cleansed;Nonselective debridement;Site care;Silver nitrate;Offloading   Wound Cleansing Approved Wound Cleanser   Periwound Protectant No-sting Skin Prep;Paste Ring;Drape   Dressing Status Clean;Dry;Intact   Dressing Changed Changed   Dressing Cleansing/Solutions Not Applicable   Dressing Options Wound Vac;Offloading Dressing - Sacral   Dressing Change/Treatment Frequency Monday, Wednesday, Friday, and As Needed   NEXT Dressing Change/Treatment Date 02/09/24   NEXT Weekly Photo (Inpatient Only) 02/14/24   Wound Team Following 3x Weekly   Non-staged Wound Description Full thickness   Wound Length (cm) 5 cm   Wound Width (cm) 0.6 cm   Wound Depth (cm) 3.2 cm   Wound Surface Area (cm^2) 3 cm^2   Wound Volume (cm^3) 9.6 cm^3   Shape Linear   Wound Odor None   WOUND NURSE ONLY - Time Spent with Patient (mins) 60       Negative Pressure Wound Therapy 02/07/24 Surgical Coccyx;Buttocks Midline (Active)   Placement Date/Time: 02/07/24 1600   Hand Hygiene Completed: Yes  Wound Type: Surgical  Location: Coccyx;Buttocks  Wound Orientation: Midline      Assessments 2/7/2024  4:00 PM   Wound Image     Vacuum Serial Number [id number]   NPWT Pump Mode / Pressure Setting Ulta;Continuous;125 mmHg   Dressing Type Medium   Number of Foam Pieces Used 2   Canister Changed No   NEXT Dressing Change/Treatment Date 02/09/24   WOUND NURSE ONLY - Time Spent with Patient (mins) 60        Vascular:    MICHELLE:   No results found.    Lab Values:    Lab Results   Component Value Date/Time    WBC 6.5 02/08/2024 05:10 AM    RBC 4.87 02/08/2024 05:10 AM    HEMOGLOBIN 14.1 02/08/2024 05:10 AM    HEMATOCRIT 41.5 02/08/2024 05:10 AM    CREACTPROT 3.17 (H) 02/06/2024 01:09 PM    SEDRATEWES 5 02/06/2024 01:09 PM    HBA1C 4.7 05/25/2021 03:15 PM         Culture Results show:  No results found for this or any previous visit (from the past 720 hour(s)).    Pain Level/Medicated:  PO pain medications administered by bedside RN 1 hour prior       INTERVENTIONS BY  WOUND TEAM:  Chart and images reviewed. Discussed with bedside RN. All areas of concern (based on picture review, LDA review and discussion with bedside RN) have been thoroughly assessed. Documentation of areas based on significant findings. This RN in to assess patient. Performed standard wound care which includes appropriate positioning, dressing removal and non-selective debridement. Pictures and measurements obtained weekly if/when required.    Wound:  Sacrococcygeal  Preparation for Dressing removal: Removed without difficulty  Cleansed/Non-selectively Debrided with:  Wound cleanser and Gauze  Callie wound: Cleansed with Wound cleanser and Gauze, Prepped with No Sting, Paste Rings, and Drape  Primary Dressin piece of regular black spiral foam used to fill wound space and bridged to right hip  Secondary (Outer) Dressing: Foam secured with drape. Button and tracpad applied. NPWT initiated. No leaks noted. Offloading foam x2    Advanced Wound Care Discharge Planning  Number of Clinicians necessary to complete wound care: 1  Is patient requiring IV pain medications for dressing changes:  No   Length of time for dressing change 30 min. (This does not include chart review, pre-medication time, set up, clean up or time spent charting.)    Interdisciplinary consultation: Patient, Bedside RN (Michelle), Kristel JOYCE (Wound RN) and Sheri TELLEZ .      EVALUATION / RATIONALE FOR TREATMENT:     Date:  24  Wound Status:  Initial evaluation    Surgical wound cavernous with clean, red tissue. Moderate amount of bleeding, controlled with silver nitrate and surgifoam. NPWT applied to manage bioburden and exudate, increase oxygenation and granulation tissue production to the area, and assist in wound closure by secondary intention. Lidocaine ordered for next change.         Goals: Steady decrease in wound area and depth weekly.    NURSING PLAN OF CARE ORDERS:  Dressing changes: See Dressing Care orders    NUTRITION  RECOMMENDATIONS   Wound Team Recommendations:  N/A    DIET ORDERS (From admission to next 24h)       Start     Ordered    02/07/24 0536  Diet Order Diet: Regular  ALL MEALS        Question:  Diet:  Answer:  Regular    02/07/24 0534                    PREVENTATIVE INTERVENTIONS:    Q shift Arun - performed per nursing policy  Q shift pressure point assessments - performed per nursing policy    Surface/Positioning  Standard/trauma mattress - Currently in Place    Anticipated discharge plans:  Outpatient Wound Center        Vac Discharge Needs:  Vac Discharge plan is purely a recommendation from wound team and not a requirement for discharge unless otherwise stated by physician.  Regular Vac in use and continued at discharge

## 2024-02-08 NOTE — DISCHARGE INSTR - CASE MGT
This RN CM spoke with Timothy,  at the [facility] OP wound Clinic. Pt has an appointment on Wed 2/15 at 15:00.

## 2024-02-08 NOTE — PROGRESS NOTES
"  DATE: 2/8/2024    Post Operative Day  2 Incision and drainage of pilonidal abscess.     INTERVAL EVENTS:  Pain is controlled  No growth on cultures   Wound vac placed  Out patient orders for wound care placed  Discharge planning   Remains on IV Rocephin   PHYSICAL EXAMINATION:  Vital Signs: /84   Pulse 86   Temp 36.9 °C (98.4 °F) (Temporal)   Resp 17   Ht 1.702 m (5' 7\")   Wt 116 kg (255 lb 11.7 oz)   SpO2 96%     Physical Exam  Vitals and nursing note reviewed.   HENT:      Head: Normocephalic.      Mouth/Throat:      Mouth: Mucous membranes are moist.   Cardiovascular:      Rate and Rhythm: Normal rate and regular rhythm.   Abdominal:      General: Abdomen is flat. There is no distension.      Comments: Dressing in place   Musculoskeletal:         General: Normal range of motion.      Cervical back: Normal range of motion.   Skin:     General: Skin is warm and dry.      Capillary Refill: Capillary refill takes less than 2 seconds.   Neurological:      General: No focal deficit present.      Mental Status: She is alert and oriented to person, place, and time.   Psychiatric:         Mood and Affect: Mood normal.         Behavior: Behavior normal.           LABORATORY VALUES:  Recent Labs     02/06/24  1309 02/07/24  0811 02/08/24  0510   WBC 13.9* 9.6 6.5   RBC 5.65* 5.02 4.87   HEMOGLOBIN 16.4* 14.5 14.1   HEMATOCRIT 48.9* 43.6 41.5   MCV 86.5 86.9 85.2   MCH 29.0 28.9 29.0   MCHC 33.5 33.3 34.0   RDW 39.9 40.5 39.5   PLATELETCT 285 243 254   MPV 9.4 9.5 9.3     Recent Labs     02/06/24  1309 02/07/24  0811 02/08/24  0510   SODIUM 139 137 137   POTASSIUM 4.1 4.1 3.9   CHLORIDE 106 105 105   CO2 19* 22 22   GLUCOSE 110* 95 95   BUN 9 9 10   CREATININE 0.66 0.60 0.57   CALCIUM 9.3 8.8 8.9     Recent Labs     02/06/24  1309   ASTSGOT 11*   ALTSGPT 17   TBILIRUBIN 0.5   ALKPHOSPHAT 116*   GLOBULIN 3.2            IMAGING:  CT-ABDOMEN-PELVIS WITH   Final Result      1.  2.2 x 3.2 cm well-circumscribed ovoid " cystic structure in the intergluteal region consistent with pilonidal sinus. There is mild stranding in the adjacent subcutaneous soft tissues which could be consistent with infected sinus with associated    cellulitis in the appropriate clinical setting.   2.  Complete situs inversus with dextrocardia.   3.  Intrauterine device.             ASSESSMENT AND PLAN:  * Sacrococcygeal pilonidal cyst with abscess- (present on admission)  Assessment & Plan  Buttock / lower back pain x 4 days  No extreral signs  WBC 13.9  CT with intergluteal abscess  2/6 Incision and drainage. Cultures pending.  Rocephin, antibiotics x 5 days.    No contraindication to deep vein thrombosis (DVT) prophylaxis  Assessment & Plan  2/6 Lovenox initiated     Morbid obesity with BMI of 40.0-44.9, adult (HCC)- (present on admission)  Assessment & Plan  BMI 40.05           ____________________________________     JAMIL Carlton.    DD: 2/8/2024  10:19 AM

## 2024-02-08 NOTE — PROGRESS NOTES
Bedside report received from day shift nurse. Assumed care at 1845.   Pt A&Ox4  Tolerating regular diet, denies n/v. + bowel sounds, + flatus, LBM PTA  Saturating >90% on RA. Denies SOB  Pt ambulates up self. SCDs off  Wound vac to sacrum    Pain is controlled through medication orders. Updated on plan of care. Safety education provided. Bed locked in low. Call light within reach. Rounding in place.

## 2024-02-09 LAB
ANION GAP SERPL CALC-SCNC: 12 MMOL/L (ref 7–16)
BACTERIA WND AEROBE CULT: NORMAL
BASOPHILS # BLD AUTO: 0.4 % (ref 0–1.8)
BASOPHILS # BLD: 0.03 K/UL (ref 0–0.12)
BUN SERPL-MCNC: 13 MG/DL (ref 8–22)
CALCIUM SERPL-MCNC: 8.8 MG/DL (ref 8.5–10.5)
CHLORIDE SERPL-SCNC: 107 MMOL/L (ref 96–112)
CO2 SERPL-SCNC: 21 MMOL/L (ref 20–33)
CREAT SERPL-MCNC: 0.67 MG/DL (ref 0.5–1.4)
EOSINOPHIL # BLD AUTO: 0.17 K/UL (ref 0–0.51)
EOSINOPHIL NFR BLD: 2.5 % (ref 0–6.9)
ERYTHROCYTE [DISTWIDTH] IN BLOOD BY AUTOMATED COUNT: 39.8 FL (ref 35.9–50)
GFR SERPLBLD CREATININE-BSD FMLA CKD-EPI: 129 ML/MIN/1.73 M 2
GLUCOSE SERPL-MCNC: 98 MG/DL (ref 65–99)
GRAM STN SPEC: NORMAL
HCT VFR BLD AUTO: 42.5 % (ref 37–47)
HGB BLD-MCNC: 14.1 G/DL (ref 12–16)
IMM GRANULOCYTES # BLD AUTO: 0.02 K/UL (ref 0–0.11)
IMM GRANULOCYTES NFR BLD AUTO: 0.3 % (ref 0–0.9)
LYMPHOCYTES # BLD AUTO: 2.99 K/UL (ref 1–4.8)
LYMPHOCYTES NFR BLD: 44.5 % (ref 22–41)
MCH RBC QN AUTO: 28.7 PG (ref 27–33)
MCHC RBC AUTO-ENTMCNC: 33.2 G/DL (ref 32.2–35.5)
MCV RBC AUTO: 86.6 FL (ref 81.4–97.8)
MONOCYTES # BLD AUTO: 0.66 K/UL (ref 0–0.85)
MONOCYTES NFR BLD AUTO: 9.8 % (ref 0–13.4)
NEUTROPHILS # BLD AUTO: 2.85 K/UL (ref 1.82–7.42)
NEUTROPHILS NFR BLD: 42.5 % (ref 44–72)
NRBC # BLD AUTO: 0 K/UL
NRBC BLD-RTO: 0 /100 WBC (ref 0–0.2)
PLATELET # BLD AUTO: 290 K/UL (ref 164–446)
PMV BLD AUTO: 9.5 FL (ref 9–12.9)
POTASSIUM SERPL-SCNC: 3.9 MMOL/L (ref 3.6–5.5)
RBC # BLD AUTO: 4.91 M/UL (ref 4.2–5.4)
SIGNIFICANT IND 70042: NORMAL
SITE SITE: NORMAL
SODIUM SERPL-SCNC: 140 MMOL/L (ref 135–145)
SOURCE SOURCE: NORMAL
WBC # BLD AUTO: 6.7 K/UL (ref 4.8–10.8)

## 2024-02-09 PROCEDURE — 700101 HCHG RX REV CODE 250: Performed by: SURGERY

## 2024-02-09 PROCEDURE — 700102 HCHG RX REV CODE 250 W/ 637 OVERRIDE(OP)

## 2024-02-09 PROCEDURE — 80048 BASIC METABOLIC PNL TOTAL CA: CPT

## 2024-02-09 PROCEDURE — RXMED WILLOW AMBULATORY MEDICATION CHARGE: Performed by: NURSE PRACTITIONER

## 2024-02-09 PROCEDURE — A9270 NON-COVERED ITEM OR SERVICE: HCPCS

## 2024-02-09 PROCEDURE — 36415 COLL VENOUS BLD VENIPUNCTURE: CPT

## 2024-02-09 PROCEDURE — 97605 NEG PRS WND THER DME<=50SQCM: CPT

## 2024-02-09 PROCEDURE — A9270 NON-COVERED ITEM OR SERVICE: HCPCS | Performed by: NURSE PRACTITIONER

## 2024-02-09 PROCEDURE — 85025 COMPLETE CBC W/AUTO DIFF WBC: CPT

## 2024-02-09 PROCEDURE — 700102 HCHG RX REV CODE 250 W/ 637 OVERRIDE(OP): Performed by: NURSE PRACTITIONER

## 2024-02-09 PROCEDURE — 700111 HCHG RX REV CODE 636 W/ 250 OVERRIDE (IP)

## 2024-02-09 PROCEDURE — 97602 WOUND(S) CARE NON-SELECTIVE: CPT

## 2024-02-09 PROCEDURE — 770001 HCHG ROOM/CARE - MED/SURG/GYN PRIV*

## 2024-02-09 RX ORDER — ACETAMINOPHEN 500 MG
1000 TABLET ORAL EVERY 6 HOURS
Qty: 30 TABLET | Refills: 0 | COMMUNITY
Start: 2024-02-09 | End: 2024-02-12

## 2024-02-09 RX ORDER — OXYCODONE HYDROCHLORIDE 5 MG/1
5 TABLET ORAL EVERY 4 HOURS PRN
Qty: 30 TABLET | Refills: 0 | Status: SHIPPED | OUTPATIENT
Start: 2024-02-09 | End: 2024-02-19

## 2024-02-09 RX ORDER — AMOXICILLIN AND CLAVULANATE POTASSIUM 875; 125 MG/1; MG/1
1 TABLET, FILM COATED ORAL EVERY 8 HOURS
Qty: 6 TABLET | Refills: 0 | Status: ACTIVE | OUTPATIENT
Start: 2024-02-09 | End: 2024-02-12

## 2024-02-09 RX ORDER — CELECOXIB 200 MG/1
200 CAPSULE ORAL EVERY 12 HOURS PRN
Qty: 14 CAPSULE | Refills: 0 | Status: SHIPPED | OUTPATIENT
Start: 2024-02-09

## 2024-02-09 RX ORDER — LIDOCAINE HYDROCHLORIDE 40 MG/ML
SOLUTION TOPICAL
Status: DISCONTINUED | OUTPATIENT
Start: 2024-02-09 | End: 2024-02-12 | Stop reason: HOSPADM

## 2024-02-09 RX ADMIN — ENOXAPARIN SODIUM 30 MG: 100 INJECTION SUBCUTANEOUS at 18:50

## 2024-02-09 RX ADMIN — ENOXAPARIN SODIUM 30 MG: 100 INJECTION SUBCUTANEOUS at 05:04

## 2024-02-09 RX ADMIN — CELECOXIB 200 MG: 200 CAPSULE ORAL at 18:50

## 2024-02-09 RX ADMIN — OXYCODONE 10 MG: 5 TABLET ORAL at 18:50

## 2024-02-09 RX ADMIN — AMOXICILLIN AND CLAVULANATE POTASSIUM 1 TABLET: 875; 125 TABLET, FILM COATED ORAL at 21:42

## 2024-02-09 RX ADMIN — DOCUSATE SODIUM 100 MG: 100 CAPSULE, LIQUID FILLED ORAL at 18:50

## 2024-02-09 RX ADMIN — OXYCODONE 10 MG: 5 TABLET ORAL at 14:16

## 2024-02-09 RX ADMIN — OXYCODONE 10 MG: 5 TABLET ORAL at 10:20

## 2024-02-09 RX ADMIN — HYDROMORPHONE HYDROCHLORIDE 0.5 MG: 1 INJECTION, SOLUTION INTRAMUSCULAR; INTRAVENOUS; SUBCUTANEOUS at 15:17

## 2024-02-09 RX ADMIN — AMOXICILLIN AND CLAVULANATE POTASSIUM 1 TABLET: 875; 125 TABLET, FILM COATED ORAL at 14:16

## 2024-02-09 RX ADMIN — ACETAMINOPHEN 1000 MG: 500 TABLET ORAL at 20:42

## 2024-02-09 RX ADMIN — CELECOXIB 200 MG: 200 CAPSULE ORAL at 05:04

## 2024-02-09 RX ADMIN — LIDOCAINE HYDROCHLORIDE 1 APPLICATION: 40 SOLUTION ORAL at 10:19

## 2024-02-09 RX ADMIN — ACETAMINOPHEN 1000 MG: 500 TABLET ORAL at 03:00

## 2024-02-09 RX ADMIN — ACETAMINOPHEN 1000 MG: 500 TABLET ORAL at 09:42

## 2024-02-09 RX ADMIN — AMOXICILLIN AND CLAVULANATE POTASSIUM 1 TABLET: 875; 125 TABLET, FILM COATED ORAL at 05:04

## 2024-02-09 RX ADMIN — OXYCODONE 10 MG: 5 TABLET ORAL at 21:41

## 2024-02-09 RX ADMIN — ACETAMINOPHEN 1000 MG: 500 TABLET ORAL at 14:16

## 2024-02-09 ASSESSMENT — PAIN DESCRIPTION - PAIN TYPE
TYPE: ACUTE PAIN

## 2024-02-09 NOTE — WOUND TEAM
[facility]  Inpatient Services  Wound and Skin Care Follow-up    Admission Date: 2/6/2024     Last order of IP CONSULT TO WOUND CARE was found on 2/6/2024 from Hospital Encounter on 2/6/2024     HPI, PMH, SH: Reviewed    Past Surgical History:   Procedure Laterality Date    INCISION AND DRAINAGE GENERAL  2/6/2024    Procedure: INCISION AND DRAINAGE OF PILONIDAL ABSCESS;  Surgeon: Jeremy Gomez M.D.;  Location: SURGERY [facility];  Service: General     Social History     Tobacco Use    Smoking status: Never    Smokeless tobacco: Never   Substance Use Topics    Alcohol use: Not Currently     Comment: Admits to occasional drinking     Chief Complaint   Patient presents with    Low Back Pain     Denies any injuries. Has pain around her tail bone. Started Saturday. Went to [place] on Sunday. Was told maybe she had a cyst.      Diagnosis: Pilonidal abscess [L05.01]  Pilonidal cyst [L05.91]    Unit where seen by Wound Team: T413/02     WOUND FOLLOW UP RELATED TO:   Coccyx     WOUND TEAM PLAN OF CARE - Frequency of Follow-up:   Nursing to follow dressing orders written for wound care. Contact wound team if area fails to progress, deteriorates or with any questions/concerns if something comes up before next scheduled follow up (See below as to whether wound is following and frequency of wound follow up)  Dressing changes by wound team:                   NPWT change 3 times weekly - Coccyx    WOUND HISTORY:       18y.o. female underwent I&D of pilonidal abscess with Dr. Gomez 2/4/24       WOUND ASSESSMENT/LDA  Wound 02/06/24 Incision Buttocks;Coccyx (Active)   Date First Assessed/Time First Assessed: 02/06/24 2003   Primary Wound Type: Incision  Location: Buttocks;Coccyx      Assessments 2/9/2024 12:00 PM   Site Assessment Red;Bleeding   Periwound Assessment Clean;Dry;Intact   Margins Attached edges;Defined edges   Closure Secondary intention   Drainage Amount Small   Drainage Description Sanguineous    Treatments Cleansed;Site care;Silver nitrate;Nonselective debridement   Wound Cleansing Approved Wound Cleanser   Periwound Protectant No-sting Skin Prep;Paste Ring;Drape   Dressing Status Clean;Intact;Dry   Dressing Changed Changed   Dressing Cleansing/Solutions Not Applicable   Dressing Options Wound Vac   Dressing Change/Treatment Frequency Monday, Wednesday, Friday, and As Needed   NEXT Dressing Change/Treatment Date 02/12/24   NEXT Weekly Photo (Inpatient Only) 02/14/24   Wound Team Following 3x Weekly   Non-staged Wound Description Full thickness   Shape Linear   Wound Odor None   WOUND NURSE ONLY - Time Spent with Patient (mins) 45       Negative Pressure Wound Therapy 02/07/24 Surgical Coccyx;Buttocks Midline (Active)   Placement Date/Time: 02/07/24 1600   Hand Hygiene Completed: Yes  Wound Type: Surgical  Location: Coccyx;Buttocks  Wound Orientation: Midline      Assessments 2/9/2024 12:00 PM   NPWT Pump Mode / Pressure Setting Ulta;Continuous;125 mmHg   Dressing Type Medium   Number of Foam Pieces Used 1   Canister Changed No   NEXT Dressing Change/Treatment Date 02/12/24        Vascular:    MICHELLE:   No results found.    Lab Values:    Lab Results   Component Value Date/Time    WBC 6.7 02/09/2024 04:05 AM    RBC 4.91 02/09/2024 04:05 AM    HEMOGLOBIN 14.1 02/09/2024 04:05 AM    HEMATOCRIT 42.5 02/09/2024 04:05 AM    CREACTPROT 3.17 (H) 02/06/2024 01:09 PM    SEDRATEWES 5 02/06/2024 01:09 PM    HBA1C 4.7 05/25/2021 03:15 PM         Culture Results show:  Recent Results (from the past 720 hour(s))   CULTURE WOUND W/ GRAM STAIN    Collection Time: 02/06/24  7:54 PM    Specimen: Wound   Result Value Ref Range    Significant Indicator NEG     Source WND     Site Pilonidal abscess     Culture Result Light growth usual skin tanner.     Gram Stain Result Many WBCs.  Few Gram positive cocci.          Pain Level/Medicated:  4% Lidocaine allowed to dwell on wound bed 1 hr prior and PO pain medications administered by  bedside RN 1 hr prior       INTERVENTIONS BY WOUND TEAM:  Chart and images reviewed. Discussed with bedside RN. All areas of concern (based on picture review, LDA review and discussion with bedside RN) have been thoroughly assessed. Documentation of areas based on significant findings. This RN in to assess patient. Performed standard wound care which includes appropriate positioning, dressing removal and non-selective debridement. Pictures and measurements obtained weekly if/when required.    Wound:  Sacroccygeal  Preparation for Dressing removal: Dressing soaked with Lidocaine and Dressing soaked with wound cleanser  Cleansed/Non-selectively Debrided with:  Wound cleanser and Gauze  Callie wound: Cleansed with Wound cleanser and Gauze, Prepped with No Sting, Paste Rings, and Drape  Primary Dressin piece of spiral black foam into wound and bridged proximally  Secondary (Outer) Dressing: Foam sealed with drape and tracpad. No leaks noted. Offloading foam underneath tubing.    Advanced Wound Care Discharge Planning  Number of Clinicians necessary to complete wound care: 1  Is patient requiring IV pain medications for dressing changes:  No   Length of time for dressing change 30 min. (This does not include chart review, pre-medication time, set up, clean up or time spent charting.)    Interdisciplinary consultation: Patient, Bedside RN (Jennifer), Kristel JOYCE (Wound RN) and Sheri TELLEZ .    EVALUATION / RATIONALE FOR TREATMENT:     Date:  24  Wound Status:  Wound progressing as expected    1200 - Wound remains clean. Still with small amount of sanguineous oozing. Continued with NPWT to assist with wound healing by secondary intention.     1335 - Informed by CM that wound vac authorization is still pending. Patient to DC today with packing and f/u with [facility] on Monday.   Bedside RN to remove vac dressing, apply packing, and educate family and patient on dressing changes. Dressing care instructions added to AVS  and supplies ordered for patient.        Date:  02/08/24  Wound Status:  Initial evaluation    Surgical wound cavernous with clean, red tissue. Moderate amount of bleeding, controlled with silver nitrate and surgifoam. NPWT applied to manage bioburden and exudate, increase oxygenation and granulation tissue production to the area, and assist in wound closure by secondary intention. Lidocaine ordered for next change.         Goals: Steady decrease in wound area and depth weekly.    NURSING PLAN OF CARE ORDERS:  Dressing changes: See Dressing Care orders    NUTRITION RECOMMENDATIONS   Wound Team Recommendations:  N/A     DIET ORDERS (From admission to next 24h)       Start     Ordered    02/07/24 0536  Diet Order Diet: Regular  ALL MEALS        Question:  Diet:  Answer:  Regular    02/07/24 0535                    PREVENTATIVE INTERVENTIONS:   Q shift Arun - performed per nursing policy  Q shift pressure point assessments - performed per nursing policy      Anticipated discharge plans:  Self/Family Care and Outpatient Wound Center        Vac Discharge Needs:  Vac Discharge plan is purely a recommendation from wound team and not a requirement for discharge unless otherwise stated by physician.  Not Applicable Pt not on a wound vac

## 2024-02-09 NOTE — DISCHARGE SUMMARY
DISCHARGE  SUMMARY    DATE OF ADMISSION: 2/6/2024    DATE OF DISCHARGE: 2/12/2024    DISCHARGE DIAGNOSIS:  Principal Problem:    Sacrococcygeal pilonidal cyst with abscess  Active Problems:    No contraindication to deep vein thrombosis (DVT) prophylaxis (Chronic)    Morbid obesity with BMI of 40.0-44.9, adult (HCC)  Resolved Problems:    * No resolved hospital problems. *      CONSULTATIONS:  Wound Care    PROCEDURES:  1. February 6, 2024, incision and drainage of pilonidal abscess    BRIEF HPI and HOSPITAL COURSE:  Briefly this is a 18-year-old female who presented to the emergency room with increasing intergluteal pain.  She was seen in outlying facility and started on antibiotics which did not help and she presented for recheck.  Patient was taken to the operative theater in the direction of her Jeremy Gomez.  First with details on his operative procedure please see dictated summary.  The day after surgery wound care assessed the patient's wound and felt she could benefit from a wound VAC and this was placed.  Patient stayed in the hospital for antibiotic therapy and wound management.  While pending insurance authorization for the wound VAC patient was attempted to try wet-to-dry dressing changes.  However she did not tolerate these secondary to pain.  On the day of discharge she has her outpatient wound VAC, wound care has been set up for her and medication prescribed.    DISPOSITION:   Discharged home on 2/12024. The patient and family were counseled and questions were answered. Specifically, signs and symptoms of infection, respiratory decompensation, wound care follow up and persistent or worsening pain were discussed and the patient agrees to seek medical attention if any of these develop.    DISCHARGE MEDICATIONS:  The patients controlled substance history was reviewed and a controlled substance use informed consent (if applicable) was provided by [facility] and the patient has been  prescribed.     Medication List        START taking these medications        Instructions   acetaminophen 500 MG Tabs  Commonly known as: Tylenol   Take 2 Tablets by mouth every 6 hours.  Dose: 1,000 mg     amoxicillin-clavulanate 875-125 MG Tabs  Commonly known as: Augmentin   Take 1 Tablet by mouth every 8 hours for 2 days.  Dose: 1 Tablet     celecoxib 200 MG Caps  Commonly known as: CeleBREX   Take 1 Capsule by mouth every 12 hours as needed for Mild Pain or Inflammation.  Dose: 200 mg     oxyCODONE immediate-release 5 MG Tabs  Commonly known as: Roxicodone   Take 1 Tablet by mouth every four hours as needed for Severe Pain for up to 7 days.  Dose: 5 mg            CONTINUE taking these medications        Instructions   lidocaine 2 % Soln  Commonly known as: Xylocaine   Take 5-15 mL by mouth every four hours as needed for Throat/Mouth Pain.  Dose: 5-15 mL     Mirena (52 MG) 20 MCG/DAY IUD  Generic drug: levonorgestrel   1 Each by Intrauterine route one time.  Dose: 1 Each            STOP taking these medications      doxycycline 100 MG Caps  Commonly known as: Vibramycin     sulfamethoxazole-trimethoprim 800-160 MG tablet  Commonly known as: Bactrim DS            You will be given a prescription for pain medication at discharge. Please take these as directed. It is important to remember not to take medications on an empty stomach as this may cause nausea.  You may also take over the counter acetaminophen and/or NSAIDS (ibuprofen, Aleve, Advil, Motrin) per the package instructions.  You may also use ice to the wound to decrease pain and swelling. You may alternate 20 minutes on and 20 minutes off with the ice for the first 24-48 hours. Make sure you place a washcloth or towel between the ice pack and your skin.  Please note that narcotic pain medication cannot be refilled unless you are seen by a doctor. Make sure you call the office if you are running low on medication or if the dose you have been prescribed is  not working well for you.    ACTIVITY:  After discharge from the hospital, you may resume full routine activities; however, there should be no heavy lifting (greater than 20 pounds or a bag of groceries) and no strenuous activities for at least 2 weeks. The duration may be longer, depending on your surgical procedure. Routine activities of daily living are acceptable. Activity level should be addressed at your post-op follow up appointment. You may drive whenever you are off pain medications and are able to perform the activities needed to drive, i.e., turning, bending, twisting, etc.      WOUND CARE:  Follow-up with wound care regarding ability to shower    DIET:  Upon discharge from the hospital, you may resume your normal preoperative diet, unless specifically directed otherwise. Depending on how you are feeling and whether you have nausea or not, you may wish to stay with a bland diet for the first few days. However, you can advance this as quickly as you feel ready.      FOLLOW UP:  [facility]  [address] [place] [address] [id number]  [facility] [address]  [phone]    Follow up  For wound re-check    Call the office if you have: (1) Fevers to more than 101F, (2) Unusual chest or leg pain, (3) Drainage or fluid from incision that may be foul smelling, increased tenderness or soreness at the wound or the wound edges are no longer together, redness or swelling at the incision site. Do not hesitate to call with any other questions.    TIME SPENT ON DISCHARGE: 32 minutes      ____________________________________________  JAMIL Carlton.    DD: 2/9/2024 2:35 PM

## 2024-02-09 NOTE — DISCHARGE PLANNING
Case Management Discharge Planning    Admission Date: 2/6/2024  GMLOS: 3.2  ALOS: 3    6-Clicks ADL Score: 24  6-Clicks Mobility Score: 24      Anticipated Discharge Dispo: Discharge Disposition: Discharged to home/self care (01) with close OP follow up   Discharge Address:  ([place] [address])  Discharge Contact Phone Number: [phone]    DME Needed: Yes    DME Ordered: Yes    Action(s) Taken: Updated Provider/Nurse on Discharge Plan    Pt was discussed with Sheri TELLEZ. As of now Pt is pending insurance auth with [facility] for a wound vac.    Plan  is to discharge Pt today with wet to dry dressing.    This RN CM spoke with Timothy,  at the [facility], Pt has an appointment on Monday 2/12 at 08:00 am.   Informed BRINA Rodriguez and Wound RN Luca of this update.     Did not cancel the wound vac with [facility]  if it gets approved, requested  to get it delivered to Pt's home address so that if the recs from [facility] is to connect  wound vac then the Pt can take it with her on her appointment.  Gris of [facility] is aware of this plan.     Escalations Completed: None    Medically Clear: Yes    Next Steps:   CM to continue to assist Pt with discharge as needed    Barriers to Discharge:   None    Is the patient up for discharge tomorrow: No

## 2024-02-09 NOTE — CARE PLAN
The patient is Stable - Low risk of patient condition declining or worsening    Shift Goals  Clinical Goals: monitor wound vac, pain management  Patient Goals: rest  Family Goals: n/a    Progress made toward(s) clinical / shift goals:  Pt medicated per MAR, resting. Monitoring wound vac, patent. Pain managed through orders. Updated pt on POC. Care ongoing.    Patient is not progressing towards the following goals:

## 2024-02-09 NOTE — CARE PLAN
The patient is Stable - Low risk of patient condition declining or worsening    Shift Goals  Clinical Goals: wound care, pain management, potential discharge home  Patient Goals: pain management  Family Goals: updates on POC    Progress made toward(s) clinical / shift goals:  Pain has been medicated per MAR. Patient and family member were educated on wound management. Patient was not able to tolerate wound packing and will not discharge home today. Patient's wound vac is to be replaced 2/10/2024.    Patient is not progressing towards the following goals: Discharge home

## 2024-02-09 NOTE — DISCHARGE INSTRUCTIONS
Post Operative Discharge Instructions:    1. DIET: Upon discharge from the hospital, you may resume your normal preoperative diet, unless specifically directed otherwise. Depending on how you are feeling and whether you have nausea or not, you may wish to stay with a bland diet for the first few days. However, you can advance this as quickly as you feel ready.    2. ACTIVITIES: After discharge from the hospital, you may resume full routine activities; however, there should be no heavy lifting (greater than 20 pounds or a bag of groceries) and no strenuous activities for at least 2 weeks. The duration may be longer, depending on your surgical procedure. Routine activities of daily living are acceptable. Activity level should be addressed at your post-op follow up appointment.    3. DRIVING: You may drive whenever you are off pain medications and are able to perform the activities needed to drive, i.e., turning, bending, twisting, etc.    4. BATHING: You may get the wound wet at any time after leaving the hospital. You may shower, but do not submerge in a bath for at least two weeks.  If you have wound dressings, they may come off after 48 hours.  If you have skin glue to the wound, this will fall off on its own, do not pick at it.  If you have Steri-Strips to the wound, these will fall off on their own, do not pick at them. You may trim the edges if needed.    5. BOWEL FUNCTION:   After surgery, it is not uncommon for patients to develop either frequent or loose stools after meals. This usually corrects itself after a few days, to a few weeks. If this occurs, do not worry; this will resolve on its own.  Constipation is much more common than loose stools. The cause is the combination of pain medication and decreased activity level and possibly the nature of the surgical procedure performed. If you feel this is occurring, you may use an over-the-counter treatment such as MiraLAX (or Milk of Magnesia, Ex-Lax,  Senokot, etc.) until the problem has resolved. Drink plenty of water and try to wean off narcotic pain medications as soon as is comfortable for you.    6. PAIN MEDICATION:   You will be given a prescription for pain medication at discharge. Please take these as directed. It is important to remember not to take medications on an empty stomach as this may cause nausea.  You may also take over the counter acetaminophen and/or NSAIDS (ibuprofen, Aleve, Advil, Motrin) per the package instructions.  You may also use ice to the wound to decrease pain and swelling. You may alternate 20 minutes on and 20 minutes off with the ice for the first 24-48 hours. Make sure you place a washcloth or towel between the ice pack and your skin.  Please note that narcotic pain medication cannot be refilled unless you are seen by a doctor. Make sure you call the office if you are running low on medication or if the dose you have been prescribed is not working well for you.    7.CALL THE [facility] AT [phone] IF YOU HAVE:  (1) Fevers to more than 101F, (2) Unusual chest or leg pain, (3) Drainage or fluid from incision that may be foul smelling, increased tenderness or soreness at the wound or the wound edges are no longer together, redness or swelling at the incision site. Do not hesitate to call with any other questions.

## 2024-02-09 NOTE — PROGRESS NOTES
Bedside report received from day shift nurse. Assumed care at 1845.   Pt A&Ox4  Tolerating regular diet, denies n/v. + bowel sounds, + flatus, LBM 2/8  Saturating >90% on RA. Denies SOB  Pt ambulates up self. SCDs off  WV to sacrum    Pain is controlled through medication orders. Updated on plan of care. Safety education provided. Bed locked in low. Call light within reach. Rounding in place.

## 2024-02-09 NOTE — DISCHARGE INSTR - WOUND CARE
"Dressing Care Instructions    1) Remove old outer dressing and strip packing.   2) Cleanse wound with wound cleanser and gauze. Pat dry.   3) Apply \"no sting skin barrier\" to skin directly surrounding wound.   4) Re-Pack wound using cotton tip applicator and one continuous piece of 2\" plain strip packing moistened with normal saline (Ensure a small tail is left outside of wound as a wick for drainage and to ensure easy removal).   5) Secure in place with an adhesive foam.   6) Change everyday or as needed per dislodgement and/or drainage saturation.  "

## 2024-02-09 NOTE — PROGRESS NOTES
"  DATE: 2/9/2024    Post Operative Day  3   2 Incision and drainage of pilonidal abscess.      INTERVAL EVENTS:  Transitioned to Augmentin  Wound vac remains pending insurance authorization  Recommending wet to dry dressing changes and DC home  Wound team would like to have insurance contacted for authorization  Vac was changed this am  Would like follow up with wound clinic on Monday 2/12    PHYSICAL EXAMINATION:  Vital Signs: /80   Pulse 93   Temp 36.6 °C (97.9 °F) (Temporal)   Resp 16   Ht 1.702 m (5' 7\")   Wt 116 kg (255 lb 11.7 oz)   SpO2 94%     Physical Exam  Vitals and nursing note reviewed.   HENT:      Head: Normocephalic.      Mouth/Throat:      Mouth: Mucous membranes are moist.   Cardiovascular:      Rate and Rhythm: Normal rate and regular rhythm.   Pulmonary:      Effort: Pulmonary effort is normal.   Abdominal:      General: Abdomen is flat. There is no distension.      Comments: Wound vac functioning    Musculoskeletal:         General: Normal range of motion.      Cervical back: Normal range of motion.   Skin:     General: Skin is warm and dry.      Capillary Refill: Capillary refill takes less than 2 seconds.   Neurological:      General: No focal deficit present.      Mental Status: She is alert and oriented to person, place, and time.   Psychiatric:         Mood and Affect: Mood normal.         Behavior: Behavior normal.           LABORATORY VALUES:  Recent Labs     02/07/24  0811 02/08/24  0510 02/09/24  0405   WBC 9.6 6.5 6.7   RBC 5.02 4.87 4.91   HEMOGLOBIN 14.5 14.1 14.1   HEMATOCRIT 43.6 41.5 42.5   MCV 86.9 85.2 86.6   MCH 28.9 29.0 28.7   MCHC 33.3 34.0 33.2   RDW 40.5 39.5 39.8   PLATELETCT 243 254 290   MPV 9.5 9.3 9.5     Recent Labs     02/07/24  0811 02/08/24  0510 02/09/24  0405   SODIUM 137 137 140   POTASSIUM 4.1 3.9 3.9   CHLORIDE 105 105 107   CO2 22 22 21   GLUCOSE 95 95 98   BUN 9 10 13   CREATININE 0.60 0.57 0.67   CALCIUM 8.8 8.9 8.8                "   IMAGING:  CT-ABDOMEN-PELVIS WITH   Final Result      1.  2.2 x 3.2 cm well-circumscribed ovoid cystic structure in the intergluteal region consistent with pilonidal sinus. There is mild stranding in the adjacent subcutaneous soft tissues which could be consistent with infected sinus with associated    cellulitis in the appropriate clinical setting.   2.  Complete situs inversus with dextrocardia.   3.  Intrauterine device.             ASSESSMENT AND PLAN:  * Sacrococcygeal pilonidal cyst with abscess- (present on admission)  Assessment & Plan  Buttock / lower back pain x 4 days  No extreral signs  WBC 13.9  CT with intergluteal abscess  2/6 Incision and drainage.    Rocephin, antibiotics x 5 days.  2/8 Transitioned to Augmentin. Cultures with no growth at this time.     No contraindication to deep vein thrombosis (DVT) prophylaxis  Assessment & Plan  2/6 Lovenox initiated     Morbid obesity with BMI of 40.0-44.9, adult (HCC)- (present on admission)  Assessment & Plan  BMI 40.05           ____________________________________     JAMIL Carlton.    DD: 2/9/2024  1:05 PM

## 2024-02-10 LAB
ANION GAP SERPL CALC-SCNC: 13 MMOL/L (ref 7–16)
BASOPHILS # BLD AUTO: 0.4 % (ref 0–1.8)
BASOPHILS # BLD: 0.03 K/UL (ref 0–0.12)
BUN SERPL-MCNC: 13 MG/DL (ref 8–22)
CALCIUM SERPL-MCNC: 9.1 MG/DL (ref 8.5–10.5)
CHLORIDE SERPL-SCNC: 105 MMOL/L (ref 96–112)
CO2 SERPL-SCNC: 21 MMOL/L (ref 20–33)
CREAT SERPL-MCNC: 0.6 MG/DL (ref 0.5–1.4)
EOSINOPHIL # BLD AUTO: 0.09 K/UL (ref 0–0.51)
EOSINOPHIL NFR BLD: 1.1 % (ref 0–6.9)
ERYTHROCYTE [DISTWIDTH] IN BLOOD BY AUTOMATED COUNT: 40.4 FL (ref 35.9–50)
GFR SERPLBLD CREATININE-BSD FMLA CKD-EPI: 132 ML/MIN/1.73 M 2
GLUCOSE SERPL-MCNC: 106 MG/DL (ref 65–99)
HCT VFR BLD AUTO: 44.6 % (ref 37–47)
HGB BLD-MCNC: 14.7 G/DL (ref 12–16)
IMM GRANULOCYTES # BLD AUTO: 0.03 K/UL (ref 0–0.11)
IMM GRANULOCYTES NFR BLD AUTO: 0.4 % (ref 0–0.9)
LYMPHOCYTES # BLD AUTO: 3.11 K/UL (ref 1–4.8)
LYMPHOCYTES NFR BLD: 38.5 % (ref 22–41)
MCH RBC QN AUTO: 28.7 PG (ref 27–33)
MCHC RBC AUTO-ENTMCNC: 33 G/DL (ref 32.2–35.5)
MCV RBC AUTO: 86.9 FL (ref 81.4–97.8)
MONOCYTES # BLD AUTO: 0.76 K/UL (ref 0–0.85)
MONOCYTES NFR BLD AUTO: 9.4 % (ref 0–13.4)
NEUTROPHILS # BLD AUTO: 4.06 K/UL (ref 1.82–7.42)
NEUTROPHILS NFR BLD: 50.2 % (ref 44–72)
NRBC # BLD AUTO: 0 K/UL
NRBC BLD-RTO: 0 /100 WBC (ref 0–0.2)
PLATELET # BLD AUTO: 283 K/UL (ref 164–446)
PMV BLD AUTO: 9.4 FL (ref 9–12.9)
POTASSIUM SERPL-SCNC: 4.2 MMOL/L (ref 3.6–5.5)
RBC # BLD AUTO: 5.13 M/UL (ref 4.2–5.4)
SODIUM SERPL-SCNC: 139 MMOL/L (ref 135–145)
WBC # BLD AUTO: 8.1 K/UL (ref 4.8–10.8)

## 2024-02-10 PROCEDURE — A9270 NON-COVERED ITEM OR SERVICE: HCPCS | Performed by: NURSE PRACTITIONER

## 2024-02-10 PROCEDURE — 36415 COLL VENOUS BLD VENIPUNCTURE: CPT

## 2024-02-10 PROCEDURE — 80048 BASIC METABOLIC PNL TOTAL CA: CPT

## 2024-02-10 PROCEDURE — 700102 HCHG RX REV CODE 250 W/ 637 OVERRIDE(OP): Performed by: NURSE PRACTITIONER

## 2024-02-10 PROCEDURE — 97605 NEG PRS WND THER DME<=50SQCM: CPT

## 2024-02-10 PROCEDURE — A9270 NON-COVERED ITEM OR SERVICE: HCPCS

## 2024-02-10 PROCEDURE — 700101 HCHG RX REV CODE 250: Performed by: SURGERY

## 2024-02-10 PROCEDURE — 700111 HCHG RX REV CODE 636 W/ 250 OVERRIDE (IP)

## 2024-02-10 PROCEDURE — 700102 HCHG RX REV CODE 250 W/ 637 OVERRIDE(OP)

## 2024-02-10 PROCEDURE — 85025 COMPLETE CBC W/AUTO DIFF WBC: CPT

## 2024-02-10 PROCEDURE — 306591 TRAY SUTURE REMOVAL DISP: Performed by: SURGERY

## 2024-02-10 PROCEDURE — 770001 HCHG ROOM/CARE - MED/SURG/GYN PRIV*

## 2024-02-10 PROCEDURE — 302098 PASTE RING (FLAT): Performed by: SURGERY

## 2024-02-10 PROCEDURE — 99024 POSTOP FOLLOW-UP VISIT: CPT | Performed by: NURSE PRACTITIONER

## 2024-02-10 RX ORDER — METAXALONE 800 MG/1
800 TABLET ORAL 3 TIMES DAILY
Status: DISCONTINUED | OUTPATIENT
Start: 2024-02-10 | End: 2024-02-12 | Stop reason: HOSPADM

## 2024-02-10 RX ADMIN — ACETAMINOPHEN 1000 MG: 500 TABLET ORAL at 04:46

## 2024-02-10 RX ADMIN — METAXALONE 800 MG: 800 TABLET ORAL at 17:08

## 2024-02-10 RX ADMIN — CELECOXIB 200 MG: 200 CAPSULE ORAL at 04:46

## 2024-02-10 RX ADMIN — OXYCODONE 10 MG: 5 TABLET ORAL at 09:17

## 2024-02-10 RX ADMIN — AMOXICILLIN AND CLAVULANATE POTASSIUM 1 TABLET: 875; 125 TABLET, FILM COATED ORAL at 04:47

## 2024-02-10 RX ADMIN — ACETAMINOPHEN 1000 MG: 500 TABLET ORAL at 21:06

## 2024-02-10 RX ADMIN — ENOXAPARIN SODIUM 30 MG: 100 INJECTION SUBCUTANEOUS at 04:47

## 2024-02-10 RX ADMIN — OXYCODONE 10 MG: 5 TABLET ORAL at 14:17

## 2024-02-10 RX ADMIN — AMOXICILLIN AND CLAVULANATE POTASSIUM 1 TABLET: 875; 125 TABLET, FILM COATED ORAL at 14:17

## 2024-02-10 RX ADMIN — DOCUSATE SODIUM 50 MG AND SENNOSIDES 8.6 MG 1 TABLET: 8.6; 5 TABLET, FILM COATED ORAL at 21:06

## 2024-02-10 RX ADMIN — AMOXICILLIN AND CLAVULANATE POTASSIUM 1 TABLET: 875; 125 TABLET, FILM COATED ORAL at 21:06

## 2024-02-10 RX ADMIN — ENOXAPARIN SODIUM 30 MG: 100 INJECTION SUBCUTANEOUS at 17:09

## 2024-02-10 RX ADMIN — ACETAMINOPHEN 1000 MG: 500 TABLET ORAL at 14:17

## 2024-02-10 RX ADMIN — OXYCODONE 10 MG: 5 TABLET ORAL at 04:46

## 2024-02-10 RX ADMIN — OXYCODONE 10 MG: 5 TABLET ORAL at 23:56

## 2024-02-10 RX ADMIN — OXYCODONE 10 MG: 5 TABLET ORAL at 17:17

## 2024-02-10 RX ADMIN — LIDOCAINE HYDROCHLORIDE 1 APPLICATION: 40 SOLUTION ORAL at 11:00

## 2024-02-10 RX ADMIN — ACETAMINOPHEN 1000 MG: 500 TABLET ORAL at 09:17

## 2024-02-10 RX ADMIN — CELECOXIB 200 MG: 200 CAPSULE ORAL at 17:08

## 2024-02-10 RX ADMIN — DOCUSATE SODIUM 100 MG: 100 CAPSULE, LIQUID FILLED ORAL at 17:09

## 2024-02-10 ASSESSMENT — PAIN DESCRIPTION - PAIN TYPE
TYPE: ACUTE PAIN

## 2024-02-10 NOTE — WOUND TEAM
[facility]  Inpatient Services  Wound and Skin Care Follow-up    Admission Date: 2/6/2024     Last order of IP CONSULT TO WOUND CARE was found on 2/6/2024 from Hospital Encounter on 2/6/2024     HPI, PMH, SH: Reviewed    Past Surgical History:   Procedure Laterality Date    INCISION AND DRAINAGE GENERAL  2/6/2024    Procedure: INCISION AND DRAINAGE OF PILONIDAL ABSCESS;  Surgeon: Jeremy Gomez M.D.;  Location: SURGERY [facility];  Service: General     Social History     Tobacco Use    Smoking status: Never    Smokeless tobacco: Never   Substance Use Topics    Alcohol use: Not Currently     Comment: Admits to occasional drinking     Chief Complaint   Patient presents with    Low Back Pain     Denies any injuries. Has pain around her tail bone. Started Saturday. Went to [place] on Sunday. Was told maybe she had a cyst.      Diagnosis: Pilonidal abscess [L05.01]  Pilonidal cyst [L05.91]    Unit where seen by Wound Team: [id number]     WOUND FOLLOW UP RELATED TO:  Coccyx       WOUND TEAM PLAN OF CARE - Frequency of Follow-up:   Nursing to follow dressing orders written for wound care. Contact wound team if area fails to progress, deteriorates or with any questions/concerns if something comes up before next scheduled follow up (See below as to whether wound is following and frequency of wound follow up)  Dressing changes by wound team:                   NPWT change 3 times weekly - Coccyx    WOUND HISTORY:       18y.o. female underwent I&D of pilonidal abscess with Dr. Gomez 2/4/24       WOUND ASSESSMENT/LDA  Wound 02/06/24 Incision Buttocks;Coccyx (Active)   Date First Assessed/Time First Assessed: 02/06/24 2003   Primary Wound Type: Incision  Location: Buttocks;Coccyx      Assessments 2/10/2024 10:45 AM   Site Assessment Red;Bleeding;Pink   Periwound Assessment Clean;Dry;Intact   Margins Defined edges;Unattached edges   Closure Secondary intention   Drainage Amount Small   Drainage Description  Sanguineous   Treatments Cleansed;Topical Lidocaine   Wound Cleansing Approved Wound Cleanser   Periwound Protectant Skin Protectant Wipes to Periwound;Paste Ring;Drape   Dressing Status Clean;Dry;Intact   Dressing Changed Changed   Dressing Cleansing/Solutions Not Applicable   Dressing Options Wound Vac   Dressing Change/Treatment Frequency Monday, Wednesday, Friday, and As Needed   NEXT Dressing Change/Treatment Date 02/12/24   NEXT Weekly Photo (Inpatient Only) 02/14/24   Wound Team Following 3x Weekly   Non-staged Wound Description Full thickness   Shape Linear   Wound Odor None   WOUND NURSE ONLY - Time Spent with Patient (mins) 30       Negative Pressure Wound Therapy 02/07/24 Surgical Coccyx;Buttocks Midline (Active)   Placement Date/Time: 02/07/24 1600   Hand Hygiene Completed: Yes  Wound Type: Surgical  Location: Coccyx;Buttocks  Wound Orientation: Midline      Assessments 2/10/2024 10:45 AM   NPWT Pump Mode / Pressure Setting [place];Continuous;125 mmHg   Dressing Type Medium   Number of Foam Pieces Used 2   Canister Changed No   NEXT Dressing Change/Treatment Date 02/12/24        Vascular:    MICHELLE:   No results found.    Lab Values:    Lab Results   Component Value Date/Time    WBC 8.1 02/10/2024 02:32 AM    RBC 5.13 02/10/2024 02:32 AM    HEMOGLOBIN 14.7 02/10/2024 02:32 AM    HEMATOCRIT 44.6 02/10/2024 02:32 AM    CREACTPROT 3.17 (H) 02/06/2024 01:09 PM    SEDRATEWES 5 02/06/2024 01:09 PM    HBA1C 4.7 05/25/2021 03:15 PM         Culture Results show:  Recent Results (from the past 720 hour(s))   CULTURE WOUND W/ GRAM STAIN    Collection Time: 02/06/24  7:54 PM    Specimen: Wound   Result Value Ref Range    Significant Indicator NEG     Source WND     Site Pilonidal abscess     Culture Result Light growth usual skin tanner.     Gram Stain Result Many WBCs.  Few Gram positive cocci.          Pain Level/Medicated:  4% Lidocaine allowed to dwell on wound bed 15min prior and PO pain medications administered by  bedside RN 60min prior       INTERVENTIONS BY WOUND TEAM:  Chart and images reviewed. Discussed with bedside RN. All areas of concern (based on picture review, LDA review and discussion with bedside RN) have been thoroughly assessed. Documentation of areas based on significant findings. This RN in to assess patient. Performed standard wound care which includes appropriate positioning, dressing removal and non-selective debridement. Pictures and measurements obtained weekly if/when required.    Wound:  Coccyx  Preparation for Dressing removal: Removed without difficulty  Cleansed/Non-selectively Debrided with:  Wound cleanser and Gauze  Callie wound: Cleansed with Wound cleanser and Gauze, Prepped with No Sting, Paste Rings, and Drape  Primary Dressing:  Kavitha placed into wound base and along R wound edge that was bleeding. One piece of half thickness regular foam applied into wound bed and slightly bridged to L sacral area. Secured with drape.  Secondary (Outer) Dressing: Button and trac pad applied. Suction resumed at 125mmHg, no leaks detected.    [facility] Discharge Planning  Number of Clinicians necessary to complete wound care: 1  Is patient requiring IV pain medications for dressing changes:  No   Length of time for dressing change 30 min. (This does not include chart review, pre-medication time, set up, clean up or time spent charting.)    Interdisciplinary consultation: Patient, Bedside RN    EVALUATION / RATIONALE FOR TREATMENT:     Date:  02/10/24  Wound Status:  Wound progressing as expected    Wet to dry packing change was too painful for patient.  Vac replaced, still with an area of sanguinous oozing around 4 o'clock wound edge. Kavitha and direct pressure applied to achieve hemostasis.  Kavitha dressing used to absorb destructive components of wound exudate and create an optimal environment for cellular growth.   Per CM note: working on home vac approval and delivery to bedside, also left VM for  [facility] regarding potential of missing 2/12 8am appt d/t home vac delivery.    Date:  02/09/24  Wound Status:  Wound progressing as expected    1200 - Wound remains clean. Still with small amount of sanguineous oozing. Continued with NPWT to assist with wound healing by secondary intention.     1335 - Informed by CM that wound vac authorization is still pending. Patient to DC today with packing and f/u with [facility] on Monday.   Bedside RN to remove vac dressing, apply packing, and educate family and patient on dressing changes. Dressing care instructions added to AVS and supplies ordered for patient.     Date:  02/08/24  Wound Status:  Initial evaluation    Surgical wound cavernous with clean, red tissue. Moderate amount of bleeding, controlled with silver nitrate and surgifoam. NPWT applied to manage bioburden and exudate, increase oxygenation and granulation tissue production to the area, and assist in wound closure by secondary intention. Lidocaine ordered for next change.         Goals: Steady decrease in wound area and depth weekly.    NURSING PLAN OF CARE ORDERS:  No new orders this visit    NUTRITION RECOMMENDATIONS   Wound Team Recommendations:  N/A     DIET ORDERS (From admission to next 24h)       Start     Ordered    02/07/24 0536  Diet Order Diet: Regular  ALL MEALS        Question:  Diet:  Answer:  Regular    02/07/24 0535                    PREVENTATIVE INTERVENTIONS:   Q shift Arun - performed per nursing policy  Q shift pressure point assessments - performed per nursing policy    Surface/Positioning  Standard/trauma mattress - Currently in Place    Anticipated discharge plans:  Outpatient Wound Center        Vac Discharge Needs:  Vac Discharge plan is purely a recommendation from wound team and not a requirement for discharge unless otherwise stated by physician.  Regular Vac in use and continued at discharge

## 2024-02-10 NOTE — CARE PLAN
The patient is Stable - Low risk of patient condition declining or worsening    Shift Goals  Clinical Goals: pain control, wound management  Patient Goals: Rest, pain control  Family Goals: updates on POC    Progress made toward(s) clinical / shift goals:  pain medicated per MAR, wound reinforced with sacral offloading dressing.    Problem: Knowledge Deficit - Standard  Goal: Patient and family/care givers will demonstrate understanding of plan of care, disease process/condition, diagnostic tests and medications  Description: Target End Date:  1-3 days or as soon as patient condition allows    Document in Patient Education    1.  Patient and family/caregiver oriented to unit, equipment, visitation policy and means for communicating concern  2.  Complete/review Learning Assessment  3.  Assess knowledge level of disease process/condition, treatment plan, diagnostic tests and medications  4.  Explain disease process/condition, treatment plan, diagnostic tests and medications  Outcome: Progressing     Problem: Wound/ / Incision Healing  Goal: Patient's wound/surgical incision will decrease in size and heals properly  Description: Target End Date:  Prior to discharge or change in level of care    Document on LDA    1.  Assess and document surgical incision/wound  2.  Provide incision/wound care per policy and/or provider orders  3.  Manage surgical drains per policy if applicable  4.  Encourage adequate nutrition to promote wound healing  5.  Collaborate with Clinical Dietician  Outcome: Progressing       Patient is not progressing towards the following goals:

## 2024-02-10 NOTE — PROGRESS NOTES
Attempted wound packing after removal of patient's wound vac per wound care order. Patient did not tolerate despite IV and oral pain medications. Patient adamantly stated several times that she could not do this at home nor would she permit full wound packing. Mepilex in place over patient's sacral wound. Notified GEOFF Lange; wound team re-consulted and requested to replace patient's wound vac.

## 2024-02-10 NOTE — WOUND TEAM
Assisted Luca CHAMPION (Wound RN), with wound care, non-selective debridement performed using wound cleanser/NS and gauze. Please see Luca CHAMPION (Wound RN) wound note for further wound care details.

## 2024-02-10 NOTE — PROGRESS NOTES
"  DATE: 2/10/2024    Post Operative Day  4 Incision and drainage of pilonidal abscess    INTERVAL EVENTS:  Patient was unable to tolerate dressing change from her back to packing.  She did not even tolerate it with topical lidocaine  Family is concerned they are unable to do dressing changes at home and therefore patient was not discharged   Wound VAC being replaced  Finishing Augmentin      PHYSICAL EXAMINATION:  Vital Signs: /71   Pulse 84   Temp 36.8 °C (98.2 °F) (Temporal)   Resp 18   Ht 1.702 m (5' 7\")   Wt 116 kg (255 lb 11.7 oz)   SpO2 96%     Physical Exam  Vitals and nursing note reviewed.   HENT:      Head: Normocephalic.      Mouth/Throat:      Mouth: Mucous membranes are moist.   Abdominal:      General: Abdomen is flat. There is no distension.      Comments: Gauze over surgical area.  No overt purulent drainage noted.  Area is extremely painful to the touch.  No induration or cellulitis noted   Musculoskeletal:         General: Normal range of motion.   Skin:     General: Skin is warm and dry.      Capillary Refill: Capillary refill takes less than 2 seconds.   Neurological:      General: No focal deficit present.      Mental Status: She is alert and oriented to person, place, and time.   Psychiatric:         Mood and Affect: Mood normal.         Behavior: Behavior normal.           LABORATORY VALUES:  Recent Labs     02/08/24  0510 02/09/24  0405 02/10/24  0232   WBC 6.5 6.7 8.1   RBC 4.87 4.91 5.13   HEMOGLOBIN 14.1 14.1 14.7   HEMATOCRIT 41.5 42.5 44.6   MCV 85.2 86.6 86.9   MCH 29.0 28.7 28.7   MCHC 34.0 33.2 33.0   RDW 39.5 39.8 40.4   PLATELETCT 254 290 283   MPV 9.3 9.5 9.4     Recent Labs     02/08/24  0510 02/09/24  0405 02/10/24  0232   SODIUM 137 140 139   POTASSIUM 3.9 3.9 4.2   CHLORIDE 105 107 105   CO2 22 21 21   GLUCOSE 95 98 106*   BUN 10 13 13   CREATININE 0.57 0.67 0.60   CALCIUM 8.9 8.8 9.1                IMAGING:  CT-ABDOMEN-PELVIS WITH   Final Result      1.  2.2 x 3.2 " cm well-circumscribed ovoid cystic structure in the intergluteal region consistent with pilonidal sinus. There is mild stranding in the adjacent subcutaneous soft tissues which could be consistent with infected sinus with associated    cellulitis in the appropriate clinical setting.   2.  Complete situs inversus with dextrocardia.   3.  Intrauterine device.             ASSESSMENT AND PLAN:  * Sacrococcygeal pilonidal cyst with abscess- (present on admission)  Assessment & Plan  Buttock / lower back pain x 4 days  No extreral signs  WBC 13.9  CT with intergluteal abscess  2/6 Incision and drainage.    Rocephin, antibiotics x 5 days.  2/8 Transitioned to Augmentin. Cultures with no growth at this time.     No contraindication to deep vein thrombosis (DVT) prophylaxis  Assessment & Plan  2/6 Lovenox initiated     Morbid obesity with BMI of 40.0-44.9, adult (HCC)- (present on admission)  Assessment & Plan  BMI 40.05           ____________________________________     JAMIL Carlton.    DD: 2/10/2024  2:06 PM

## 2024-02-10 NOTE — DISCHARGE PLANNING
Case Management Discharge Planning    Admission Date: 2/6/2024  GMLOS: 3.2  ALOS: 4    6-Clicks ADL Score: 24  6-Clicks Mobility Score: 24      Anticipated Discharge Dispo: Discharge Disposition: Discharged to home/self care (01)  Discharge Address:  ([address]  [place] [address])  Discharge Contact Phone Number: [phone]    DME Needed: Yes    DME Ordered: Yes; pending home delivery of wound vac from [facility]    Action(s) Taken: Chart review completed. Patient discussed during IDT rounds.     Per GEOFF Lange, patient did not tolerate wet to dry dressings and will need a wound vac delivered to bedside prior to discharge.     RNCM left VM for [facility] re: update on patient status/delivery of wound vac to patient's bedside prior to discharge.     RNCM  Left VM for [facility] re: potential for missed appt due to needing wound vac delivered prior to discharge. Per notes, patient has an appt at their facility on 2/12 at 8am.     Escalations Completed: None    Medically Clear: No    Next Steps: CM to follow up with IDT regarding discharge barriers/needs.     Barriers to Discharge: Medical clearance and DME; wound vac    Is the patient up for discharge tomorrow: No

## 2024-02-10 NOTE — PROGRESS NOTES
Pt is A&O 4  Pain + 7/10 medicated per MAR and ice pack provided   - nausea  Tolerating a regular diet   Sacral/buttock wound with DIP  + Voids  + flatus  + BM  Up SBA  SCD's refused  Bed alarm off, pt no fall risk per freedom dickinson  Reviewed plan of care with patient, bed in lowest position and locked, pt resting comfortably now, call light within reach, all needs met at this time. Interventions will be executed per plan of care

## 2024-02-11 LAB
ANION GAP SERPL CALC-SCNC: 14 MMOL/L (ref 7–16)
BASOPHILS # BLD AUTO: 0.4 % (ref 0–1.8)
BASOPHILS # BLD: 0.03 K/UL (ref 0–0.12)
BUN SERPL-MCNC: 13 MG/DL (ref 8–22)
CALCIUM SERPL-MCNC: 8.9 MG/DL (ref 8.5–10.5)
CHLORIDE SERPL-SCNC: 105 MMOL/L (ref 96–112)
CO2 SERPL-SCNC: 20 MMOL/L (ref 20–33)
CREAT SERPL-MCNC: 0.62 MG/DL (ref 0.5–1.4)
EOSINOPHIL # BLD AUTO: 0.13 K/UL (ref 0–0.51)
EOSINOPHIL NFR BLD: 1.7 % (ref 0–6.9)
ERYTHROCYTE [DISTWIDTH] IN BLOOD BY AUTOMATED COUNT: 39.3 FL (ref 35.9–50)
GFR SERPLBLD CREATININE-BSD FMLA CKD-EPI: 131 ML/MIN/1.73 M 2
GLUCOSE SERPL-MCNC: 117 MG/DL (ref 65–99)
HCT VFR BLD AUTO: 42.2 % (ref 37–47)
HGB BLD-MCNC: 14.5 G/DL (ref 12–16)
IMM GRANULOCYTES # BLD AUTO: 0.03 K/UL (ref 0–0.11)
IMM GRANULOCYTES NFR BLD AUTO: 0.4 % (ref 0–0.9)
LYMPHOCYTES # BLD AUTO: 2.93 K/UL (ref 1–4.8)
LYMPHOCYTES NFR BLD: 37.9 % (ref 22–41)
MCH RBC QN AUTO: 29.1 PG (ref 27–33)
MCHC RBC AUTO-ENTMCNC: 34.4 G/DL (ref 32.2–35.5)
MCV RBC AUTO: 84.6 FL (ref 81.4–97.8)
MONOCYTES # BLD AUTO: 0.67 K/UL (ref 0–0.85)
MONOCYTES NFR BLD AUTO: 8.7 % (ref 0–13.4)
NEUTROPHILS # BLD AUTO: 3.95 K/UL (ref 1.82–7.42)
NEUTROPHILS NFR BLD: 50.9 % (ref 44–72)
NRBC # BLD AUTO: 0 K/UL
NRBC BLD-RTO: 0 /100 WBC (ref 0–0.2)
PLATELET # BLD AUTO: 262 K/UL (ref 164–446)
PMV BLD AUTO: 9.4 FL (ref 9–12.9)
POTASSIUM SERPL-SCNC: 4.3 MMOL/L (ref 3.6–5.5)
RBC # BLD AUTO: 4.99 M/UL (ref 4.2–5.4)
SODIUM SERPL-SCNC: 139 MMOL/L (ref 135–145)
WBC # BLD AUTO: 7.7 K/UL (ref 4.8–10.8)

## 2024-02-11 PROCEDURE — 700111 HCHG RX REV CODE 636 W/ 250 OVERRIDE (IP)

## 2024-02-11 PROCEDURE — A9270 NON-COVERED ITEM OR SERVICE: HCPCS

## 2024-02-11 PROCEDURE — 85025 COMPLETE CBC W/AUTO DIFF WBC: CPT

## 2024-02-11 PROCEDURE — 80048 BASIC METABOLIC PNL TOTAL CA: CPT

## 2024-02-11 PROCEDURE — A9270 NON-COVERED ITEM OR SERVICE: HCPCS | Performed by: NURSE PRACTITIONER

## 2024-02-11 PROCEDURE — 99024 POSTOP FOLLOW-UP VISIT: CPT | Performed by: NURSE PRACTITIONER

## 2024-02-11 PROCEDURE — 700102 HCHG RX REV CODE 250 W/ 637 OVERRIDE(OP): Performed by: NURSE PRACTITIONER

## 2024-02-11 PROCEDURE — 36415 COLL VENOUS BLD VENIPUNCTURE: CPT

## 2024-02-11 PROCEDURE — 770001 HCHG ROOM/CARE - MED/SURG/GYN PRIV*

## 2024-02-11 PROCEDURE — 700102 HCHG RX REV CODE 250 W/ 637 OVERRIDE(OP)

## 2024-02-11 RX ADMIN — METAXALONE 800 MG: 800 TABLET ORAL at 06:36

## 2024-02-11 RX ADMIN — ENOXAPARIN SODIUM 30 MG: 100 INJECTION SUBCUTANEOUS at 18:55

## 2024-02-11 RX ADMIN — METAXALONE 800 MG: 800 TABLET ORAL at 11:15

## 2024-02-11 RX ADMIN — OXYCODONE 5 MG: 5 TABLET ORAL at 06:36

## 2024-02-11 RX ADMIN — DOCUSATE SODIUM 100 MG: 100 CAPSULE, LIQUID FILLED ORAL at 06:36

## 2024-02-11 RX ADMIN — CELECOXIB 200 MG: 200 CAPSULE ORAL at 06:36

## 2024-02-11 RX ADMIN — METAXALONE 800 MG: 800 TABLET ORAL at 18:55

## 2024-02-11 RX ADMIN — ACETAMINOPHEN 1000 MG: 500 TABLET ORAL at 13:25

## 2024-02-11 RX ADMIN — ENOXAPARIN SODIUM 30 MG: 100 INJECTION SUBCUTANEOUS at 06:36

## 2024-02-11 RX ADMIN — ACETAMINOPHEN 1000 MG: 500 TABLET ORAL at 22:42

## 2024-02-11 ASSESSMENT — PAIN DESCRIPTION - PAIN TYPE
TYPE: ACUTE PAIN
TYPE: ACUTE PAIN

## 2024-02-11 NOTE — PROGRESS NOTES
Pt is A&O 4  Pain +4/10 patient reports that is is tolerable at this time and would like to proceed without PRN intervention    - nausea  Tolerating a regular diet   Sacral/buttock wound vac, maintaining proper suction  + Voids  + flatus  - BM  Up self  SCD's refused  Bed alarm off, pt no fall risk per freedom dickinson  Reviewed plan of care with patient, bed in lowest position and locked, pt resting comfortably now, call light within reach, all needs met at this time. Interventions will be executed per plan of care

## 2024-02-11 NOTE — PROGRESS NOTES
Report received from RN, assumed care at 0645  Pt is A0X4, and responds appropriately   Pt declines any SOB, chest pain, new onset of numbness/ tingling  Pt rates pain at 7-8/10, on a scale of 1-10, pt medicated per MAR  Pt is voiding adequately and without hesitancy  Pt has + flatus, + bowel sounds, + BM on 2/9  Pt ambulates independently   Replaced wound vac to perineum  Pt is tolerating a diet, pt denies any nausea/vomiting  Awaiting home wound vac for discharge  Plan of care discussed, all questions answered. Explained importance of calling before getting OOB and pt verbalizes understanding. Explained importance of oral care. Call light is within reach, treaded slipper socks on, bed in lowest/ locked position, hourly rounding in place, all needs met at this time

## 2024-02-11 NOTE — CARE PLAN
The patient is Stable - Low risk of patient condition declining or worsening    Shift Goals  Clinical Goals: replace wound vac today  Patient Goals: keep pain at a moderate level or better  Family Goals: updates on POC    Progress made toward(s) clinical / shift goals:  Vac reapplied by wound team today    Patient is not progressing towards the following goals:

## 2024-02-11 NOTE — PROGRESS NOTES
Report received at bedside from previous shift RN   Assumed care. Pt in bed. A/O x 4. VS stable  Responds appropriately.   Denies pain, denies SOB/ denies chest pain. Provided non pharmacological interventions for comfort.  Denies N/V tolerating regular diet appropriately  Patient on RA  +Void, +flatus, last BM 2/9 per report  Patient ambulates independently   SCDs off, patient ambulatory  WV to sacrum  Assessment complete.   Discussed POC, pt verbalizes understanding.   Explained importance of calling before getting OOB.   Call light and belongings within reach.    Bed in the lowest position. Treaded socks in place. Hourly rounding in progress.

## 2024-02-11 NOTE — CARE PLAN
The patient is Stable - Low risk of patient condition declining or worsening    Shift Goals  Clinical Goals: pain control, ambulation  Patient Goals: Rest  Family Goals: updates on POC    Progress made toward(s) clinical / shift goals:  patient pain medicated per MAR, ambulating independently. Wound vac assessed and holding proper suction.     Problem: Knowledge Deficit - Standard  Goal: Patient and family/care givers will demonstrate understanding of plan of care, disease process/condition, diagnostic tests and medications  Description: Target End Date:  1-3 days or as soon as patient condition allows    Document in Patient Education    1.  Patient and family/caregiver oriented to unit, equipment, visitation policy and means for communicating concern  2.  Complete/review Learning Assessment  3.  Assess knowledge level of disease process/condition, treatment plan, diagnostic tests and medications  4.  Explain disease process/condition, treatment plan, diagnostic tests and medications  Outcome: Progressing     Problem: Urinary Elimination  Goal: Establish and maintain regular urinary output  Description: Target End Date:  Prior to discharge or change in level of care    Document on I/O and Assessment flowsheets    1.  Evaluate need to continue indwelling catheter every shift  2.  Assess signs and symptoms of urinary retention  3.  Assess post-void residual volumes  4.  Implement bladder training program  5.  Encourage scheduled voidings  6.  Assist patient to sit on bedside commode or toilet for voiding  7.  Educate patient and family/caregiver on use and purpose of urine collection devices (document in Patient Education)  Outcome: Progressing     Problem: Wound/ / Incision Healing  Goal: Patient's wound/surgical incision will decrease in size and heals properly  Description: Target End Date:  Prior to discharge or change in level of care    Document on LDA    1.  Assess and document surgical incision/wound  2.   Provide incision/wound care per policy and/or provider orders  3.  Manage surgical drains per policy if applicable  4.  Encourage adequate nutrition to promote wound healing  5.  Collaborate with Clinical Dietician  Outcome: Progressing       Patient is not progressing towards the following goals:

## 2024-02-11 NOTE — PROGRESS NOTES
"  DATE: 2/11/2024    Post Operative Day  5 Incision and drainage     INTERVAL EVENTS:  Patient ambulating in [place]  Patient medically clear for discharge once wound VAC and dressing changes have been established  Insurance has been pending      PHYSICAL EXAMINATION:  Vital Signs: /72   Pulse 89   Temp 36.5 °C (97.7 °F) (Temporal)   Resp 18   Ht 1.702 m (5' 7\")   Wt 116 kg (255 lb 11.7 oz)   SpO2 97%     Physical Exam  Vitals and nursing note reviewed.   HENT:      Head: Normocephalic.      Mouth/Throat:      Mouth: Mucous membranes are moist.   Abdominal:      General: Abdomen is flat. There is no distension.      Comments: Wound vac   Musculoskeletal:         General: Normal range of motion.   Skin:     General: Skin is warm and dry.      Capillary Refill: Capillary refill takes less than 2 seconds.   Neurological:      General: No focal deficit present.      Mental Status: She is alert and oriented to person, place, and time.   Psychiatric:         Mood and Affect: Mood normal.         Behavior: Behavior normal.       .    LABORATORY VALUES:  Recent Labs     02/09/24  0405 02/10/24  0232 02/11/24  0151   WBC 6.7 8.1 7.7   RBC 4.91 5.13 4.99   HEMOGLOBIN 14.1 14.7 14.5   HEMATOCRIT 42.5 44.6 42.2   MCV 86.6 86.9 84.6   MCH 28.7 28.7 29.1   MCHC 33.2 33.0 34.4   RDW 39.8 40.4 39.3   PLATELETCT 290 283 262   MPV 9.5 9.4 9.4     Recent Labs     02/09/24 0405 02/10/24  0232 02/11/24  0151   SODIUM 140 139 139   POTASSIUM 3.9 4.2 4.3   CHLORIDE 107 105 105   CO2 21 21 20   GLUCOSE 98 106* 117*   BUN 13 13 13   CREATININE 0.67 0.60 0.62   CALCIUM 8.8 9.1 8.9                IMAGING:  CT-ABDOMEN-PELVIS WITH   Final Result      1.  2.2 x 3.2 cm well-circumscribed ovoid cystic structure in the intergluteal region consistent with pilonidal sinus. There is mild stranding in the adjacent subcutaneous soft tissues which could be consistent with infected sinus with associated    cellulitis in the appropriate clinical " setting.   2.  Complete situs inversus with dextrocardia.   3.  Intrauterine device.             ASSESSMENT AND PLAN:  * Sacrococcygeal pilonidal cyst with abscess- (present on admission)  Assessment & Plan  Buttock / lower back pain x 4 days  No extreral signs  WBC 13.9  CT with intergluteal abscess  2/6 Incision and drainage.    Rocephin, antibiotics x 5 days.  2/8 Transitioned to Augmentin. Cultures with no growth at this time.     No contraindication to deep vein thrombosis (DVT) prophylaxis  Assessment & Plan  2/6 Lovenox initiated     Morbid obesity with BMI of 40.0-44.9, adult (HCC)- (present on admission)  Assessment & Plan  BMI 40.05           ____________________________________     JAMIL Carlton.    DD: 2/11/2024  10:35 AM  Needs call

## 2024-02-12 ENCOUNTER — APPOINTMENT (OUTPATIENT)
Dept: WOUND CARE | Facility: MEDICAL CENTER | Age: 19
End: 2024-02-12
Attending: NURSE PRACTITIONER
Payer: MEDICAID

## 2024-02-12 ENCOUNTER — PHARMACY VISIT (OUTPATIENT)
Dept: PHARMACY | Facility: MEDICAL CENTER | Age: 19
End: 2024-02-12
Payer: COMMERCIAL

## 2024-02-12 VITALS
WEIGHT: 255.73 LBS | BODY MASS INDEX: 40.14 KG/M2 | SYSTOLIC BLOOD PRESSURE: 108 MMHG | HEART RATE: 79 BPM | TEMPERATURE: 97.9 F | DIASTOLIC BLOOD PRESSURE: 71 MMHG | RESPIRATION RATE: 16 BRPM | OXYGEN SATURATION: 96 % | HEIGHT: 67 IN

## 2024-02-12 PROBLEM — E66.01 MORBID OBESITY WITH BMI OF 40.0-44.9, ADULT (HCC): Chronic | Status: ACTIVE | Noted: 2021-08-11

## 2024-02-12 PROCEDURE — 302098 PASTE RING (FLAT): Performed by: SURGERY

## 2024-02-12 PROCEDURE — 97602 WOUND(S) CARE NON-SELECTIVE: CPT

## 2024-02-12 PROCEDURE — 99024 POSTOP FOLLOW-UP VISIT: CPT | Performed by: NURSE PRACTITIONER

## 2024-02-12 PROCEDURE — 700102 HCHG RX REV CODE 250 W/ 637 OVERRIDE(OP)

## 2024-02-12 PROCEDURE — A9270 NON-COVERED ITEM OR SERVICE: HCPCS | Performed by: NURSE PRACTITIONER

## 2024-02-12 PROCEDURE — 700102 HCHG RX REV CODE 250 W/ 637 OVERRIDE(OP): Performed by: NURSE PRACTITIONER

## 2024-02-12 PROCEDURE — RXMED WILLOW AMBULATORY MEDICATION CHARGE: Performed by: NURSE PRACTITIONER

## 2024-02-12 PROCEDURE — 700111 HCHG RX REV CODE 636 W/ 250 OVERRIDE (IP)

## 2024-02-12 PROCEDURE — A9270 NON-COVERED ITEM OR SERVICE: HCPCS

## 2024-02-12 RX ORDER — METAXALONE 800 MG/1
800 TABLET ORAL EVERY 8 HOURS PRN
Qty: 21 TABLET | Refills: 0 | Status: SHIPPED | OUTPATIENT
Start: 2024-02-12 | End: 2024-02-19

## 2024-02-12 RX ORDER — ACETAMINOPHEN 500 MG
1000 TABLET ORAL EVERY 6 HOURS PRN
COMMUNITY
Start: 2024-02-12

## 2024-02-12 RX ADMIN — ENOXAPARIN SODIUM 30 MG: 100 INJECTION SUBCUTANEOUS at 06:25

## 2024-02-12 RX ADMIN — METAXALONE 800 MG: 800 TABLET ORAL at 12:20

## 2024-02-12 RX ADMIN — HYDROMORPHONE HYDROCHLORIDE 0.5 MG: 1 INJECTION, SOLUTION INTRAMUSCULAR; INTRAVENOUS; SUBCUTANEOUS at 15:42

## 2024-02-12 RX ADMIN — METAXALONE 800 MG: 800 TABLET ORAL at 06:25

## 2024-02-12 RX ADMIN — OXYCODONE 5 MG: 5 TABLET ORAL at 15:08

## 2024-02-12 ASSESSMENT — PAIN DESCRIPTION - PAIN TYPE
TYPE: ACUTE PAIN
TYPE: ACUTE PAIN

## 2024-02-12 NOTE — CARE PLAN
Problem: Wound/ / Incision Healing  Goal: Patient's wound/surgical incision will decrease in size and heals properly  Description: Target End Date:  Prior to discharge or change in level of care    Document on LDA    1.  Assess and document surgical incision/wound  2.  Provide incision/wound care per policy and/or provider orders  3.  Manage surgical drains per policy if applicable  4.  Encourage adequate nutrition to promote wound healing  5.  Collaborate with Clinical Dietician  Outcome: Progressing     Problem: Knowledge Deficit - Standard  Goal: Patient and family/care givers will demonstrate understanding of plan of care, disease process/condition, diagnostic tests and medications  Description: Target End Date:  1-3 days or as soon as patient condition allows    Document in Patient Education    1.  Patient and family/caregiver oriented to unit, equipment, visitation policy and means for communicating concern  2.  Complete/review Learning Assessment  3.  Assess knowledge level of disease process/condition, treatment plan, diagnostic tests and medications  4.  Explain disease process/condition, treatment plan, diagnostic tests and medications  Outcome: Progressing     The patient is Stable - Low risk of patient condition declining or worsening    Shift Goals  Clinical Goals: [place] mngmt/Home [place] approval  Patient Goals: rest  Family Goals: updates on POC    Progress made toward(s) clinical / shift goals:  Wound following, wound vac in place to coccyx wound. Minimal c/o pain this shift    Patient is not progressing towards the following goals:

## 2024-02-12 NOTE — WOUND TEAM
[facility]  Inpatient Services  Wound and Skin Care Follow-up    Admission Date: 2/6/2024     Last order of IP CONSULT TO WOUND CARE was found on 2/6/2024 from Hospital Encounter on 2/6/2024     HPI, PMH, SH: Reviewed    Past Surgical History:   Procedure Laterality Date    INCISION AND DRAINAGE GENERAL  2/6/2024    Procedure: INCISION AND DRAINAGE OF PILONIDAL ABSCESS;  Surgeon: Jeremy Gomez M.D.;  Location: SURGERY [facility];  Service: General     Social History     Tobacco Use    Smoking status: Never    Smokeless tobacco: Never   Substance Use Topics    Alcohol use: Not Currently     Comment: Admits to occasional drinking     Chief Complaint   Patient presents with    Low Back Pain     Denies any injuries. Has pain around her tail bone. Started Saturday. Went to [facility] on Sunday. Was told maybe she had a cyst.      Diagnosis: Pilonidal abscess [L05.01]  Pilonidal cyst [L05.91]    Unit where seen by Wound Team: T413/02     WOUND FOLLOW UP RELATED TO:  Buttocks       WOUND TEAM PLAN OF CARE - Frequency of Follow-up:   Nursing to follow dressing orders written for wound care. Contact wound team if area fails to progress, deteriorates or with any questions/concerns if something comes up before next scheduled follow up (See below as to whether wound is following and frequency of wound follow up)  Dressing changes by wound team:                   NPWT change 3 times weekly - Buttocks    WOUND HISTORY:       18y.o. female underwent I&D of pilonidal abscess with Dr. Gomez 2/4/24       WOUND ASSESSMENT/LDA  Wound 02/06/24 Full Thickness Wound Open Incision Buttocks;Coccyx (Active)   Date First Assessed/Time First Assessed: 02/06/24 2003   Primary Wound Type: Full Thickness Wound  Surgical Wound Type: Open Incision  Location: Buttocks;Coccyx      Assessments 2/12/2024  3:00 PM   Site Assessment Red;Drainage   Periwound Assessment Clean;Dry;Intact   Margins Attached edges;Defined edges   Closure  Secondary intention   Drainage Amount Small   Drainage Description Serosanguineous   Treatments Cleansed;Nonselective debridement;Site care;Topical Lidocaine   Wound Cleansing Approved Wound Cleanser   Periwound Protectant No-sting Skin Prep;Paste Ring;Drape   Dressing Status Clean;Dry;Intact   Dressing Changed Changed   Dressing Cleansing/Solutions Not Applicable   Dressing Options Wound Vac;Collagen Dressing   Dressing Change/Treatment Frequency Monday, Wednesday, Friday, and As Needed   NEXT Dressing Change/Treatment Date 02/14/24   NEXT Weekly Photo (Inpatient Only) 02/14/24   Wound Team Following 3x Weekly   Non-staged Wound Description Full thickness   Shape Oval   Wound Odor None   WOUND NURSE ONLY - Time Spent with Patient (mins) 60       Negative Pressure Wound Therapy 02/07/24 Surgical Coccyx;Buttocks Midline (Active)   Placement Date/Time: 02/07/24 1600   Hand Hygiene Completed: Yes  Wound Type: Surgical  Location: Coccyx;Buttocks  Wound Orientation: Midline      Assessments 2/12/2024  3:00 PM   NPWT Pump Mode / Pressure Setting Ulta;Continuous;125 mmHg   Dressing Type Black Foam (Regular);Small   Number of Foam Pieces Used 2   Canister Changed No   NEXT Dressing Change/Treatment Date 02/14/24        Vascular:    MICHELLE:   No results found.    Lab Values:    Lab Results   Component Value Date/Time    WBC 7.7 02/11/2024 01:51 AM    RBC 4.99 02/11/2024 01:51 AM    HEMOGLOBIN 14.5 02/11/2024 01:51 AM    HEMATOCRIT 42.2 02/11/2024 01:51 AM    CREACTPROT 3.17 (H) 02/06/2024 01:09 PM    SEDRATEWES 5 02/06/2024 01:09 PM    HBA1C 4.7 05/25/2021 03:15 PM         Culture Results show:  Recent Results (from the past 720 hour(s))   CULTURE WOUND W/ GRAM STAIN    Collection Time: 02/06/24  7:54 PM    Specimen: Wound   Result Value Ref Range    Significant Indicator NEG     Source WND     Site Pilonidal abscess     Culture Result Light growth usual skin tanner.     Gram Stain Result Many WBCs.  Few Gram positive cocci.           Pain Level/Medicated:  4% Lidocaine allowed to dwell on wound bed 30min prior and PO pain medications administered by bedside RN 30min prior       INTERVENTIONS BY WOUND TEAM:  Chart and images reviewed. Discussed with bedside RN. All areas of concern (based on picture review, LDA review and discussion with bedside RN) have been thoroughly assessed. Documentation of areas based on significant findings. This RN in to assess patient. Performed standard wound care which includes appropriate positioning, dressing removal and non-selective debridement. Pictures and measurements obtained weekly if/when required.    Wound:  Sacrococcygeal area  Preparation for Dressing removal: Dressing soaked with Lidocaine  Cleansed/Non-selectively Debrided with:  Normal Saline and Gauze  Callie wound: Cleansed with Normal Saline and Gauze, Prepped with No Sting, Paste Rings, and Drape  Primary Dressing:  Kavitha moistened with saline was packed into wound. One piece of half thickness spiraled black regular foam applied into wound bed and out along drape bridge. All foam secured with drape.  Secondary (Outer) Dressing: A hole was cut in drape and a 2nd piece of half thickness circular black foam was applied as a button for trac pad. Trac pad applied and suction resumed. No leaks noted. Fenestrated sacral offloading dressing applied.    Advanced Wound Care Discharge Planning  Number of Clinicians necessary to complete wound care: 1  Is patient requiring IV pain medications for dressing changes:  No   Length of time for dressing change 30 min. (This does not include chart review, pre-medication time, set up, clean up or time spent charting.)    Interdisciplinary consultation: Patient, Bedside RN (Reyna), N/A.  Pressure injury and staging reviewed with N/A.    EVALUATION / RATIONALE FOR TREATMENT:     Date:  02/12/24  Wound Status:  Wound progressing as expected    Wound is clean. Continued with NPWT. Pt connected to home wound vac.  Educated on repair of leak and blockage. All supplies are at bedside. Pt will need PO pain medications for discharge. Discussed with bedside RNs.    Date:  02/10/24  Wound Status:  Wound progressing as expected    Wet to dry packing change was too painful for patient.  Vac replaced, still with an area of sanguinous oozing around 4 o'clock wound edge. Kavitha and direct pressure applied to achieve hemostasis.  Kavitha dressing used to absorb destructive components of wound exudate and create an optimal environment for cellular growth.   Per CM note: working on home vac approval and delivery to bedside, also left VM for [facility] regarding potential of missing 2/12 8am appt d/t home vac delivery.    Date:  02/09/24  Wound Status:  Wound progressing as expected    1200 - Wound remains clean. Still with small amount of sanguineous oozing. Continued with NPWT to assist with wound healing by secondary intention.     1335 - Informed by CM that wound vac authorization is still pending. Patient to DC today with packing and f/u with [facility] on Monday.   Bedside RN to remove vac dressing, apply packing, and educate family and patient on dressing changes. Dressing care instructions added to AVS and supplies ordered for patient.     Date:  02/08/24  Wound Status:  Initial evaluation    Surgical wound cavernous with clean, red tissue. Moderate amount of bleeding, controlled with silver nitrate and surgifoam. NPWT applied to manage bioburden and exudate, increase oxygenation and granulation tissue production to the area, and assist in wound closure by secondary intention. Lidocaine ordered for next change.         Goals: Steady decrease in wound area and depth weekly.    NURSING PLAN OF CARE ORDERS:  No new orders this visit    NUTRITION RECOMMENDATIONS   Wound Team Recommendations:  N/A     DIET ORDERS (From admission to next 24h)       Start     Ordered    02/07/24 0536  Diet Order Diet: Regular  ALL MEALS        Question:  Diet:   Answer:  Regular    02/07/24 0535                  Anticipated discharge plans:  Outpatient Wound Center        Vac Discharge Needs:  Vac Discharge plan is purely a recommendation from wound team and not a requirement for discharge unless otherwise stated by physician.  Regular Vac in use and continued at discharge

## 2024-02-12 NOTE — PROGRESS NOTES
Report received at bedside from previous shift RN. Assumed care. Pt in bed. A/O x 4. VS stable  Responds appropriately.   Denies pain, denies SOB/ denies chest pain. Provided non pharmacological interventions for comfort.  Denies N/V tolerating regular diet appropriately  Patient on RA  +Void, +flatus, last BM 2/9 per report  Patient ambulates independently   SCDs off, patient ambulatory  WV to sacrum  Assessment complete.   Discussed POC, pt verbalizes understanding.   Explained importance of calling before getting OOB.   Call light and belongings within reach.    Bed in the lowest position. Treaded socks in place. Hourly rounding in progress

## 2024-02-12 NOTE — CARE PLAN
The patient is Stable - Low risk of patient condition declining or worsening    Shift Goals  Clinical Goals: WV management  Patient Goals: Discharge  Family Goals: Discharge    Progress made toward(s) clinical / shift goals:  WV management per protocol. Patient with discharge order in place. No barriers to discharge      Patient is not progressing towards the following goals:

## 2024-02-12 NOTE — DISCHARGE PLANNING
Case Management Discharge Planning    Admission Date: 2/6/2024  GMLOS: 3.2  ALOS: 6    6-Clicks ADL Score: 24  6-Clicks Mobility Score: 24      Anticipated Discharge Dispo: Discharge Disposition: Discharged to home/self care (01)  Discharge Address:  ([facility] [address]  [place] [address])  Discharge Contact Phone Number: [phone]    DME Needed: Yes    DME Ordered: Yes    Action(s) Taken: Updated Provider/Nurse on Discharge Plan    This RN CM spoke with Timothy,  at the [facility] wound [facility]. Pt has an appointment  on Wed 2/15 at 15:00.  Still pending insurance auth with [facility] for  a wound vac.   Per Annemarie of [facility], Insurance auth is complete and it will be delivered today .    Informed Sheri TELLEZ.     Escalations Completed: None    Medically Clear: Yes    Next Steps:   CM to continue to assist Pt with discharge as needed    Barriers to Discharge:   None    Is the patient up for discharge tomorrow: No

## 2024-02-12 NOTE — PROGRESS NOTES
"  DATE: 2/12/2024    Post Operative Day  6 Incision and drainage of pilonidal abscess    INTERVAL EVENTS:  Wound was replaced after patient was unable to tolerate packing  Insurance authorize was accomplished this am and wound vac to bedside  Discharge patient   PHYSICAL EXAMINATION:  Vital Signs: /82   Pulse 71   Temp 36.7 °C (98.1 °F) (Temporal)   Resp 16   Ht 1.702 m (5' 7\")   Wt 116 kg (255 lb 11.7 oz)   SpO2 97%     Physical Exam  Vitals and nursing note reviewed.   Abdominal:      Palpations: Abdomen is soft.      Comments: Wound vac   Skin:     General: Skin is warm and dry.   Neurological:      Mental Status: She is alert and oriented to person, place, and time.           LABORATORY VALUES:  Recent Labs     02/10/24  0232 02/11/24  0151   WBC 8.1 7.7   RBC 5.13 4.99   HEMOGLOBIN 14.7 14.5   HEMATOCRIT 44.6 42.2   MCV 86.9 84.6   MCH 28.7 29.1   MCHC 33.0 34.4   RDW 40.4 39.3   PLATELETCT 283 262   MPV 9.4 9.4     Recent Labs     02/10/24  0232 02/11/24  0151   SODIUM 139 139   POTASSIUM 4.2 4.3   CHLORIDE 105 105   CO2 21 20   GLUCOSE 106* 117*   BUN 13 13   CREATININE 0.60 0.62   CALCIUM 9.1 8.9                IMAGING:  CT-ABDOMEN-PELVIS WITH   Final Result      1.  2.2 x 3.2 cm well-circumscribed ovoid cystic structure in the intergluteal region consistent with pilonidal sinus. There is mild stranding in the adjacent subcutaneous soft tissues which could be consistent with infected sinus with associated    cellulitis in the appropriate clinical setting.   2.  Complete situs inversus with dextrocardia.   3.  Intrauterine device.             ASSESSMENT AND PLAN:  * Sacrococcygeal pilonidal cyst with abscess- (present on admission)  Assessment & Plan  Buttock / lower back pain x 4 days  No extreral signs  WBC 13.9  CT with intergluteal abscess  2/6 Incision and drainage.    Rocephin, antibiotics x 5 days.  2/8 Transitioned to Augmentin. Cultures with no growth at this time.     No contraindication " to deep vein thrombosis (DVT) prophylaxis- (present on admission)  Assessment & Plan  2/6 Lovenox initiated     Morbid obesity with BMI of 40.0-44.9, adult (HCC)- (present on admission)  Assessment & Plan  BMI 40.05           ____________________________________     APOORVA Carlton    DD: 2/12/2024  8:58 AM

## 2024-02-13 NOTE — PROGRESS NOTES
Patient discharge order in place. Patient candidate for d/c lounge. All belongings in place. Patient discharged through d/c lounge staff.   Wound Vac w/patient, dressing changed by wound care team.

## 2024-02-14 ENCOUNTER — NON-PROVIDER VISIT (OUTPATIENT)
Dept: WOUND CARE | Facility: MEDICAL CENTER | Age: 19
End: 2024-02-14
Attending: NURSE PRACTITIONER
Payer: MEDICAID

## 2024-02-14 PROCEDURE — 99211 OFF/OP EST MAY X REQ PHY/QHP: CPT

## 2024-02-14 PROCEDURE — 97605 NEG PRS WND THER DME<=50SQCM: CPT

## 2024-02-15 NOTE — PATIENT INSTRUCTIONS
-Keep your wound dressing clean, dry, and intact.    -Wound vac may not have any drainage in tube or cannister & it will still be working.   Change cannister if it does become full by pressing tab on side of machine to remove canister and snap on new one. Full canister can be thrown in the trash. If cannister fills with bright red blood - go to ER. Dressing will be changed every MWF at the wound clinic.  If you are having issues with your wound VAC, please consider patching leaks, changing the canister, or calling [phone] for troubleshooting. If the wound VAC has been off or un-operational for over 2 hours, call wound care center to inform them and remove all dressings including black foam and replace with normal saline damp gauze.     -Should you experience any significant changes in your wound(s), such as infection (redness, swelling, localized heat, increased pain, fever > 101 F, chills) or have any questions regarding your home care instructions, please contact the wound center at [phone]. If after hours, contact your primary care physician or go to the hospital emergency room.

## 2024-02-15 NOTE — CERTIFICATION
Non Provider Encounter- Full Thickness wound    HISTORY OF PRESENT ILLNESS  Wound History:    START OF CARE IN CLINIC: 2024    REFERRING PROVIDER: APOORVA Carlton   WOUND- Full Thickness Wound   LOCATION: Gluteal Cleft   HISTORY: Patient is a pleasant 18 y.o. patient accompanied by her mother, Radha, after I&D of pilonidal cyst by Dr. Gomez on 24. Patient stated that she started to have pain around her tailbone on 2/3/24, went to [facility] in [place] and was given pain medication and doxycycline. Patient continued to have worsening pain and presented to [facility] ER on 24, where the CT scan showed a cyst and patient was taken in that evening for surgery with Dr. Gomez. Patient stated that they tried to pack her wound in the hospital with gauze and discharge her home, but she was unable to tolerate due to pain. Patient stated that they ended up placing a wound vac and she will be coming to the clinic for 3x/week dressing changes.    Pertinent Labs and Diagnostics:    Labs:     A1c:   Lab Results   Component Value Date/Time    HBA1C 4.7 2021 03:15 PM          [id number]  CT-ABDOMEN-PELVIS WITH  Order: [id number]  Status: Final result       Visible to patient: Yes (seen)       Next appt: 2024 at 01:00 PM in Wound Care (Frank Orta R.N.)    0 Result Notes  Details    Reading Physician Reading Date Result Priority   Robert Mohan M.D.  [phone] 2024      Narrative & Impression     2024 3:44 PM     HISTORY/REASON FOR EXAM:  Pain; With Oral and IV contrast.  Sacral pain, difficulty walking, chronic diarrhea.        TECHNIQUE/EXAM DESCRIPTION:   CT scan of the abdomen and pelvis with contrast.     Contrast-enhanced helical scanning was obtained from the diaphragmatic domes through the pubic symphysis following the bolus administration of nonionic contrast without complication.     100 mL of Omnipaque 350 nonionic contrast was administered without complication.     Low  dose optimization technique was utilized for this CT exam including automated exposure control and adjustment of the mA and/or kV according to patient size.     COMPARISON: No prior studies available.     FINDINGS:     There is complete situs inversus with dextrocardia.        Lower Chest: Unremarkable.     Liver: Normal.     Spleen: Unremarkable.     Pancreas: Unremarkable.     Gallbladder: No calcified stones.     Biliary: Nondilated.     Adrenal glands: Normal.     Kidneys: Unremarkable without hydronephrosis.     Bowel: No obstruction or acute inflammation.     Lymph nodes: No adenopathy.     Vasculature: Unremarkable.     Peritoneum: Unremarkable without ascites.     Musculoskeletal: No acute or destructive process.     Pelvis: No adenopathy or free fluid. Intrauterine device is in place within the canal. There is a 2.2 x 3.2 cm well-circumscribed ovoid cystic structure in the intergluteal region consistent with pilonidal sinus. There is mild stranding in the adjacent   subcutaneous soft tissues which could be consistent with infected cyst with cellulitis in the appropriate clinical setting.           IMPRESSION:     1.  2.2 x 3.2 cm well-circumscribed ovoid cystic structure in the intergluteal region consistent with pilonidal sinus. There is mild stranding in the adjacent subcutaneous soft tissues which could be consistent with infected sinus with associated   cellulitis in the appropriate clinical setting.  2.  Complete situs inversus with dextrocardia.  3.  Intrauterine device.              Exam Ended: 02/06/24  4:02 PM Last Resulted: 02/06/24  4:12 PM             VASCULAR STUDIES: N/A    LAST  WOUND CULTURE:  DATE : 2/6/24  Anaerobic Culture  Order: [id number] - Reflex for Order [id number]  Status: Preliminary result       Visible to patient: No (not released)       Next appt: 02/16/2024 at 01:00 PM in Wound Care (Frank Orta R.N.)    Specimen Information: Wound   0 Result Notes      Component 8 d ago    Significant Indicator NEG P   Source WND P   Site Pilonidal abscess P   Culture Result Culture in progress. P   Resulting Agency M              Specimen Collected: 02/06/24  7:54 PM Last Resulted: 02/08/24  3:39 PM        Lab Flowsheet        Order Details        View Encounter        Lab and Collection Details        Routing        Result History     View All Conversations on this Encounter        P=Value has a preliminary status        Result Care Coordination      Patient Communication     Not Released  Not seen Back to Top         CULTURE WOUND W/ GRAM STAIN  Order: [id number]  Status: Final result         Visible to patient: Yes (seen)         Next appt: 02/16/2024 at 01:00 PM in Wound Care (Frank Orta R.N.)      Specimen Information: Wound   0 Result Notes          Component 8 d ago   Significant Indicator NEG   Source WND   Site Pilonidal abscess   Culture Result Light growth usual skin tanner.   Gram Stain Result Many WBCs.  Few Gram positive cocci.   Resulting Agency M              Specimen Collected: 02/06/24  7:54 PM Last Resulted: 02/09/24 12:19 PM                      Patient allergies and medications reviewed via Epic.     Wound Assessment:      Negative Pressure Wound Therapy 02/07/24 Surgical Other (Comment) (Active)   NPWT Pump Mode / Pressure Setting Continuous;125 mmHg 02/14/24 1500   Dressing Type Black Foam (Regular) 02/14/24 1500   Number of Foam Pieces Used 1 02/14/24 1500   Canister Changed Yes 02/14/24 1500           Wound 02/14/24 Full Thickness Wound Open Incision Gluteal Cleft (Active)   Wound Image   02/14/24 1500   Site Assessment Red;Early/partial granulation;Painful 02/14/24 1500   Periwound Assessment Intact 02/14/24 1500   Margins Unattached edges 02/14/24 1500   Closure Secondary intention 02/14/24 1500   Drainage Amount Moderate 02/14/24 1500   Drainage Description Serosanguineous 02/14/24 1500   Treatments Cleansed;Topical Lidocaine;Site care 02/14/24 1500   Wound Cleansing  Hypochlorus Acid 02/14/24 1500   Periwound Protectant Skin Protectant Wipes to Periwound;Paste Ring;Drape 02/14/24 1500   Dressing Changed New 02/14/24 1500   Dressing Cleansing/Solutions Not Applicable 02/14/24 1500   Dressing Options Wound Vac;Offloading Dressing - Sacral 02/14/24 1500   Dressing Change/Treatment Frequency Monday, Wednesday, Friday, and As Needed 02/14/24 1500   Wound Team Following 3x Weekly 02/14/24 1500   Non-staged Wound Description Full thickness 02/14/24 1500   Wound Length (cm) 4.3 cm 02/14/24 1500   Wound Width (cm) 0.9 cm 02/14/24 1500   Wound Depth (cm) 2.6 cm 02/14/24 1500   Wound Surface Area (cm^2) 3.87 cm^2 02/14/24 1500   Wound Volume (cm^3) 10.062 cm^3 02/14/24 1500   Wound Healing % -5 02/14/24 1500   Tunneling (cm) 0 cm 02/14/24 1500   Undermining (cm) 0 cm 02/14/24 1500   Wound Odor None 02/14/24 1500   Exposed Structures None 02/14/24 1500        Procedures:    VAC dressing removed by CNA. Wound bed inspected and no residual foam noted. NPWT changed this visit using 1 piece of black foam. Pump set to neg 125mmhg continuous. No leaks noted.        PATIENT EDUCATION  -Advised to go to ER for any increased redness, swelling, drainage or odor, or if patient develops fever, chills, nausea or vomiting.  -Importance of adequate nutrition for wound healing  -Increase protein intake     Instructed pt on s/s infection - chills, fever, NV, increased pain/swelling/drainage or foul odor and to go to Urgent Care or ER; that battery lasts 6 hours and to always charge overnight (or plug in whenever at home); on trouble shooting, including making sure cannister is seated properly and not cracked, that clamps are open, and signs of leakage including machine alarming and screen showing that pressure is not at 125 mm/Hg or prescribed pressure; to seal leaks by painting with Skin Prep and applying more drape tape to dressing if necessary; that if unable to fix leak for 2 hours, to remove all tape  and foam and cover wound with moist dressing, ( gauze soaked with normal saline, then dry sterile gauze, pt given supplies) and to notify [facility] during business hours; to come to appointments 3x/week; to keep dressing D/I; to bring supplies to each appointment; on 800 number for [employer] and 24 hour support; pt verbalized understanding.

## 2024-02-16 ENCOUNTER — NON-PROVIDER VISIT (OUTPATIENT)
Dept: WOUND CARE | Facility: MEDICAL CENTER | Age: 19
End: 2024-02-16
Attending: NURSE PRACTITIONER
Payer: MEDICAID

## 2024-02-16 PROCEDURE — 97605 NEG PRS WND THER DME<=50SQCM: CPT

## 2024-02-16 NOTE — PROGRESS NOTES
2% viscous lidocaine applied topically to wound bed for approximately 5 minutes prior to NPWT dressing change.NPWT < 50 sq cm dressing changed this visit, 1 pieces of foam removed. 1 new piece of black foam placed to wound bed and used for tracpad. Pump set to 125 mmhg, continuous suction. No leaks detected. Refer to flow sheet for wound care details. Patient tolerated the procedure well.

## 2024-02-16 NOTE — PATIENT INSTRUCTIONS
-Keep your wound dressing clean, dry, and intact.    -Change your dressing if it becomes soiled, soaked, or falls off.    -Wound vac may not have any drainage in tube or cannister & it will still be working.   Change cannister if it does become full by pressing tab on side of machine to remove canister and snap on new one. Full canister can be thrown in the trash. If cannister fills with bright red blood - go to ER. Dressing will be changed every MWF at the wound clini.  If you are having issues with your wound VAC, please consider patching leaks, changing the canister, or calling [phone] for troubleshooting. If the wound VAC has been off or un-operational for over 2 hours, call wound care center to inform them and remove all dressings including black foam and replace with normal saline damp gauze.     -Should you experience any significant changes in your wound(s), such as infection (redness, swelling, localized heat, increased pain, fever > 101 F, chills) or have any questions regarding your home care instructions, please contact the wound center at [phone]. If after hours, contact your primary care physician or go to the hospital emergency room.

## 2024-02-19 ENCOUNTER — NON-PROVIDER VISIT (OUTPATIENT)
Dept: WOUND CARE | Facility: MEDICAL CENTER | Age: 19
End: 2024-02-19
Attending: NURSE PRACTITIONER
Payer: MEDICAID

## 2024-02-19 PROCEDURE — 97605 NEG PRS WND THER DME<=50SQCM: CPT

## 2024-02-20 ENCOUNTER — OFFICE VISIT (OUTPATIENT)
Dept: SURGERY | Facility: MEDICAL CENTER | Age: 19
End: 2024-02-20
Attending: SURGERY
Payer: MEDICAID

## 2024-02-20 VITALS
SYSTOLIC BLOOD PRESSURE: 120 MMHG | HEART RATE: 102 BPM | WEIGHT: 255 LBS | DIASTOLIC BLOOD PRESSURE: 72 MMHG | HEIGHT: 67 IN | RESPIRATION RATE: 16 BRPM | BODY MASS INDEX: 40.02 KG/M2 | OXYGEN SATURATION: 97 %

## 2024-02-20 DIAGNOSIS — Z98.890 STATUS POST INCISION AND DRAINAGE: ICD-10-CM

## 2024-02-20 PROCEDURE — 3074F SYST BP LT 130 MM HG: CPT | Performed by: NURSE PRACTITIONER

## 2024-02-20 PROCEDURE — 3078F DIAST BP <80 MM HG: CPT | Performed by: NURSE PRACTITIONER

## 2024-02-20 PROCEDURE — 99212 OFFICE O/P EST SF 10 MIN: CPT | Performed by: NURSE PRACTITIONER

## 2024-02-20 ASSESSMENT — ENCOUNTER SYMPTOMS
RESPIRATORY NEGATIVE: 1
CARDIOVASCULAR NEGATIVE: 1
GASTROINTESTINAL NEGATIVE: 1
MUSCULOSKELETAL NEGATIVE: 1

## 2024-02-20 ASSESSMENT — FIBROSIS 4 INDEX: FIB4 SCORE: 0.19

## 2024-02-20 NOTE — PROGRESS NOTES
"Karla Archuleta is a 18 y.o. female who presents with Post-op (Incision and drainage of pilonidal abscess)    Patient is been seeing wound care as an outpatient.  She states is going very well and a are pleased with the progress of her wound.  Her next wound care visit is tomorrow on February 21          HPI    Review of Systems   Respiratory: Negative.     Cardiovascular: Negative.    Gastrointestinal: Negative.    Genitourinary: Negative.    Musculoskeletal: Negative.               Objective     /72 (BP Location: Left arm, Patient Position: Sitting)   Pulse (!) 102   Resp 16   Ht 1.702 m (5' 7\")   Wt 116 kg (255 lb)   SpO2 97%   BMI 39.94 kg/m²      Physical Exam  Vitals and nursing note reviewed. Exam conducted with a chaperone present.   Constitutional:       Appearance: Normal appearance.   HENT:      Head: Atraumatic.   Abdominal:      Comments: Wound VAC in place and functioning   Musculoskeletal:         General: Normal range of motion.   Skin:     General: Skin is warm and dry.      Capillary Refill: Capillary refill takes less than 2 seconds.   Neurological:      Mental Status: She is oriented to person, place, and time.   Psychiatric:         Mood and Affect: Mood normal.                             Assessment & Plan        1. Status post incision and drainage  Continue to follow wound care  Follow-up PRN                "

## 2024-02-20 NOTE — PATIENT INSTRUCTIONS
-Keep your wound dressing clean, dry, and intact.    -Change your dressing if it becomes soiled, soaked, or falls off.    -Wound vac may not have any drainage in tube or cannister & it will still be working.   Change cannister if it does become full by pressing tab on side of machine to remove canister and snap on new one. Full canister can be thrown in the trash. If cannister fills with bright red blood - go to ER. Dressing will be changed every MWF at the wound clinic.  If you are having issues with your wound VAC, please consider patching leaks, changing the canister, or calling [phone] for troubleshooting. If the wound VAC has been off or un-operational for over 2 hours, call wound care center to inform them and remove all dressings including black foam and replace with normal saline damp gauze.     -Should you experience any significant changes in your wound(s), such as infection (redness, swelling, localized heat, increased pain, fever > 101 F, chills) or have any questions regarding your home care instructions, please contact the wound center at [phone]. If after hours, contact your primary care physician or go to the hospital emergency room.

## 2024-02-20 NOTE — PROGRESS NOTES
2% viscous lidocaine applied topically to wound bed for approximately 5 minutes prior to NPWT dressing change.NPWT < 50 sq cm dressing changed this visit, 1 pieces of foam removed by CNA; no residual foam noted upon inspection. 1 new piece of black foam placed to wound bed and used for tracpad. Pump set to 125 mmhg, continuous suction. No leaks detected. Refer to flow sheet for wound care details. Patient tolerated the procedure well.    Reviewed how to patch leaks, when and how to remove dressing if leaks persist, and to apply saline moistened gauze dressing with patient and mother.

## 2024-02-21 ENCOUNTER — NON-PROVIDER VISIT (OUTPATIENT)
Dept: WOUND CARE | Facility: MEDICAL CENTER | Age: 19
End: 2024-02-21
Attending: NURSE PRACTITIONER
Payer: MEDICAID

## 2024-02-21 DIAGNOSIS — L05.01 SACROCOCCYGEAL PILONIDAL CYST WITH ABSCESS: ICD-10-CM

## 2024-02-21 PROCEDURE — 97602 WOUND(S) CARE NON-SELECTIVE: CPT

## 2024-02-21 PROCEDURE — 97605 NEG PRS WND THER DME<=50SQCM: CPT

## 2024-02-21 NOTE — PATIENT INSTRUCTIONS
-Keep your wound dressing clean, dry, and intact.    -Wound vac may not have any drainage in tube or cannister & it will still be working.   Change cannister if it does become full by pressing tab on side of machine to remove canister and snap on new one. Full canister can be thrown in the trash. If cannister fills with bright red blood - go to ER. Dressing will be changed every MWF at the wound clinic.  If you are having issues with your wound VAC, please consider patching leaks, changing the canister, or calling [phone] for troubleshooting. If the wound VAC has been off or un-operational for over 2 hours, call wound care center to inform them and remove all dressings including black foam and replace with normal saline damp gauze.     -Should you experience any significant changes in your wound(s), such as infection (redness, swelling, localized heat, increased pain, fever > 101 F, chills) or have any questions regarding your home care instructions, please contact the wound center at [phone]. If after hours, contact your primary care physician or go to the hospital emergency room.      16

## 2024-02-21 NOTE — PROCEDURES
Nonselective debridement performed to gluteal cleft wound using NS/Qtip to remove loosely adherent nonviable tissue. Pt tolerated well.    NPWT <50cm2 placed to same site per protocol.

## 2024-02-23 ENCOUNTER — NON-PROVIDER VISIT (OUTPATIENT)
Dept: WOUND CARE | Facility: MEDICAL CENTER | Age: 19
End: 2024-02-23
Attending: NURSE PRACTITIONER
Payer: MEDICAID

## 2024-02-23 PROCEDURE — 97605 NEG PRS WND THER DME<=50SQCM: CPT

## 2024-02-23 NOTE — PROGRESS NOTES
2% viscous lidocaine for ~5 minute dwell time. Non selective with NS, gauze and cotton tipped applicator to remove non viable tissue from wound bed.   - NPWT dressing changed to gluteal cleft wound, < 50 cm ².   - 2 piece(s) of black foam removed. No residual foam noted in wound bed.   - 3 piece(s) of black foam used.   - Resumed continuous suction at 125 mm Hg, no leaks noted.

## 2024-02-23 NOTE — PATIENT INSTRUCTIONS
-Keep your wound dressing clean, dry, and intact. Only change dressing if it's over saturated, soiled or falls off.     -Change your dressing if it becomes soiled, soaked, or falls off.    -Wound vac may not have any drainage in tube or cannister & it will still be working.   Change cannister if it does become full by pressing tab on side of machine to remove canister and snap on new one. Full canister can be thrown in the trash. If cannister fills with bright red blood - go to ER. Dressing will be changed every MWF at the wound clini.  If you are having issues with your wound VAC, please consider patching leaks, changing the canister, or calling [phone] for troubleshooting. If the wound VAC has been off or un-operational for over 2 hours, call wound care center to inform them and remove all dressings including black foam and replace with normal saline damp gauze.     -Should you experience any significant changes in your wound(s), such as infection (redness, swelling, localized heat, increased pain, fever > 101 F, chills) or have any questions regarding your home care instructions, please contact the wound center at [phone]. If after hours, contact your primary care physician or go to the hospital emergency room.

## 2024-02-25 ENCOUNTER — APPOINTMENT (OUTPATIENT)
Dept: RADIOLOGY | Facility: MEDICAL CENTER | Age: 19
End: 2024-02-25
Attending: EMERGENCY MEDICINE
Payer: MEDICAID

## 2024-02-25 ENCOUNTER — HOSPITAL ENCOUNTER (EMERGENCY)
Facility: MEDICAL CENTER | Age: 19
End: 2024-02-25
Attending: EMERGENCY MEDICINE
Payer: MEDICAID

## 2024-02-25 VITALS
HEART RATE: 92 BPM | RESPIRATION RATE: 16 BRPM | DIASTOLIC BLOOD PRESSURE: 53 MMHG | OXYGEN SATURATION: 97 % | BODY MASS INDEX: 38.93 KG/M2 | SYSTOLIC BLOOD PRESSURE: 98 MMHG | WEIGHT: 256.84 LBS | TEMPERATURE: 97.4 F | HEIGHT: 68 IN

## 2024-02-25 DIAGNOSIS — L05.01 PILONIDAL CYST WITH ABSCESS: ICD-10-CM

## 2024-02-25 LAB
ALBUMIN SERPL BCP-MCNC: 4.4 G/DL (ref 3.2–4.9)
ALBUMIN/GLOB SERPL: 1.8 G/DL
ALP SERPL-CCNC: 108 U/L (ref 30–99)
ALT SERPL-CCNC: 27 U/L (ref 2–50)
ANION GAP SERPL CALC-SCNC: 14 MMOL/L (ref 7–16)
AST SERPL-CCNC: 17 U/L (ref 12–45)
BASOPHILS # BLD AUTO: 0.3 % (ref 0–1.8)
BASOPHILS # BLD: 0.03 K/UL (ref 0–0.12)
BILIRUB SERPL-MCNC: 0.3 MG/DL (ref 0.1–1.5)
BUN SERPL-MCNC: 12 MG/DL (ref 8–22)
CALCIUM ALBUM COR SERPL-MCNC: 9 MG/DL (ref 8.5–10.5)
CALCIUM SERPL-MCNC: 9.3 MG/DL (ref 8.5–10.5)
CHLORIDE SERPL-SCNC: 107 MMOL/L (ref 96–112)
CO2 SERPL-SCNC: 20 MMOL/L (ref 20–33)
CREAT SERPL-MCNC: 0.66 MG/DL (ref 0.5–1.4)
EOSINOPHIL # BLD AUTO: 0.05 K/UL (ref 0–0.51)
EOSINOPHIL NFR BLD: 0.5 % (ref 0–6.9)
ERYTHROCYTE [DISTWIDTH] IN BLOOD BY AUTOMATED COUNT: 39.8 FL (ref 35.9–50)
GFR SERPLBLD CREATININE-BSD FMLA CKD-EPI: 129 ML/MIN/1.73 M 2
GLOBULIN SER CALC-MCNC: 2.5 G/DL (ref 1.9–3.5)
GLUCOSE SERPL-MCNC: 91 MG/DL (ref 65–99)
GRAM STN SPEC: NORMAL
HCT VFR BLD AUTO: 46.1 % (ref 37–47)
HGB BLD-MCNC: 15.6 G/DL (ref 12–16)
IMM GRANULOCYTES # BLD AUTO: 0.02 K/UL (ref 0–0.11)
IMM GRANULOCYTES NFR BLD AUTO: 0.2 % (ref 0–0.9)
LYMPHOCYTES # BLD AUTO: 4.21 K/UL (ref 1–4.8)
LYMPHOCYTES NFR BLD: 45.5 % (ref 22–41)
MCH RBC QN AUTO: 28.8 PG (ref 27–33)
MCHC RBC AUTO-ENTMCNC: 33.8 G/DL (ref 32.2–35.5)
MCV RBC AUTO: 85.2 FL (ref 81.4–97.8)
MONOCYTES # BLD AUTO: 0.83 K/UL (ref 0–0.85)
MONOCYTES NFR BLD AUTO: 9 % (ref 0–13.4)
NEUTROPHILS # BLD AUTO: 4.11 K/UL (ref 1.82–7.42)
NEUTROPHILS NFR BLD: 44.5 % (ref 44–72)
NRBC # BLD AUTO: 0 K/UL
NRBC BLD-RTO: 0 /100 WBC (ref 0–0.2)
PLATELET # BLD AUTO: 327 K/UL (ref 164–446)
PMV BLD AUTO: 9.3 FL (ref 9–12.9)
POTASSIUM SERPL-SCNC: 4.3 MMOL/L (ref 3.6–5.5)
PROT SERPL-MCNC: 6.9 G/DL (ref 6–8.2)
RBC # BLD AUTO: 5.41 M/UL (ref 4.2–5.4)
SIGNIFICANT IND 70042: NORMAL
SITE SITE: NORMAL
SODIUM SERPL-SCNC: 141 MMOL/L (ref 135–145)
SOURCE SOURCE: NORMAL
WBC # BLD AUTO: 9.3 K/UL (ref 4.8–10.8)

## 2024-02-25 PROCEDURE — 99284 EMERGENCY DEPT VISIT MOD MDM: CPT

## 2024-02-25 PROCEDURE — 85025 COMPLETE CBC W/AUTO DIFF WBC: CPT

## 2024-02-25 PROCEDURE — 72193 CT PELVIS W/DYE: CPT

## 2024-02-25 PROCEDURE — 80053 COMPREHEN METABOLIC PANEL: CPT

## 2024-02-25 PROCEDURE — 700111 HCHG RX REV CODE 636 W/ 250 OVERRIDE (IP): Mod: UD | Performed by: EMERGENCY MEDICINE

## 2024-02-25 PROCEDURE — 36415 COLL VENOUS BLD VENIPUNCTURE: CPT

## 2024-02-25 PROCEDURE — 87070 CULTURE OTHR SPECIMN AEROBIC: CPT

## 2024-02-25 PROCEDURE — 87205 SMEAR GRAM STAIN: CPT

## 2024-02-25 PROCEDURE — 96365 THER/PROPH/DIAG IV INF INIT: CPT

## 2024-02-25 PROCEDURE — 700117 HCHG RX CONTRAST REV CODE 255: Mod: UD | Performed by: EMERGENCY MEDICINE

## 2024-02-25 RX ORDER — LIDOCAINE HYDROCHLORIDE AND EPINEPHRINE 10; 10 MG/ML; UG/ML
10 INJECTION, SOLUTION INFILTRATION; PERINEURAL ONCE
Status: DISCONTINUED | OUTPATIENT
Start: 2024-02-25 | End: 2024-02-25

## 2024-02-25 RX ORDER — CLINDAMYCIN HYDROCHLORIDE 300 MG/1
300 CAPSULE ORAL 3 TIMES DAILY
Qty: 30 CAPSULE | Refills: 0 | Status: ACTIVE | OUTPATIENT
Start: 2024-02-25 | End: 2024-03-06

## 2024-02-25 RX ORDER — CLINDAMYCIN PHOSPHATE 600 MG/50ML
600 INJECTION, SOLUTION INTRAVENOUS ONCE
Status: COMPLETED | OUTPATIENT
Start: 2024-02-25 | End: 2024-02-25

## 2024-02-25 RX ADMIN — IOHEXOL 90 ML: 350 INJECTION, SOLUTION INTRAVENOUS at 20:00

## 2024-02-25 RX ADMIN — CLINDAMYCIN PHOSPHATE 600 MG: 600 INJECTION, SOLUTION INTRAVENOUS at 20:12

## 2024-02-25 RX ADMIN — FENTANYL CITRATE 50 MCG: 50 INJECTION, SOLUTION INTRAMUSCULAR; INTRAVENOUS at 18:36

## 2024-02-25 ASSESSMENT — FIBROSIS 4 INDEX: FIB4 SCORE: 0.19

## 2024-02-26 ENCOUNTER — OFFICE VISIT (OUTPATIENT)
Dept: WOUND CARE | Facility: MEDICAL CENTER | Age: 19
End: 2024-02-26
Attending: NURSE PRACTITIONER
Payer: MEDICAID

## 2024-02-26 VITALS
OXYGEN SATURATION: 97 % | DIASTOLIC BLOOD PRESSURE: 64 MMHG | TEMPERATURE: 98.6 F | RESPIRATION RATE: 18 BRPM | SYSTOLIC BLOOD PRESSURE: 101 MMHG | HEART RATE: 118 BPM

## 2024-02-26 DIAGNOSIS — Q89.3 SITUS INVERSUS TOTALIS: ICD-10-CM

## 2024-02-26 DIAGNOSIS — E66.01 MORBID OBESITY WITH BMI OF 40.0-44.9, ADULT (HCC): Chronic | ICD-10-CM

## 2024-02-26 DIAGNOSIS — L05.01 SACROCOCCYGEAL PILONIDAL CYST WITH ABSCESS: ICD-10-CM

## 2024-02-26 PROCEDURE — 3074F SYST BP LT 130 MM HG: CPT | Performed by: STUDENT IN AN ORGANIZED HEALTH CARE EDUCATION/TRAINING PROGRAM

## 2024-02-26 PROCEDURE — 99214 OFFICE O/P EST MOD 30 MIN: CPT

## 2024-02-26 PROCEDURE — 99214 OFFICE O/P EST MOD 30 MIN: CPT | Performed by: STUDENT IN AN ORGANIZED HEALTH CARE EDUCATION/TRAINING PROGRAM

## 2024-02-26 PROCEDURE — 3078F DIAST BP <80 MM HG: CPT | Performed by: STUDENT IN AN ORGANIZED HEALTH CARE EDUCATION/TRAINING PROGRAM

## 2024-02-26 PROCEDURE — 99213 OFFICE O/P EST LOW 20 MIN: CPT

## 2024-02-26 ASSESSMENT — ENCOUNTER SYMPTOMS
CHILLS: 0
FEVER: 0

## 2024-02-26 NOTE — ED NOTES
Wound care nurse called, advised pt has apt with wound care  tomorrow at 1600, recommends wet to dry dressing and have wound care place a new wound vac dressing tomorrow at her appointment.  Erp ok with plan, wound care will send supplies to apply wet to dry dressing.

## 2024-02-26 NOTE — ED NOTES
Bedside report received from off going RN/tech: Cr, assumed care of patient.  POC discussed with patient. Call light within reach, all needs addressed at this time.       Fall risk interventions in place: Not Applicable (all applicable per [place] Fall risk assessment)   Continuous monitoring: Pulse Ox or Blood Pressure  IVF/IV medications: Not Applicable   Oxygen: Room Air  Bedside sitter: Not Applicable   Isolation: Not Applicable

## 2024-02-26 NOTE — ED PROVIDER NOTES
ED Provider Note    CHIEF COMPLAINT  Chief Complaint   Patient presents with    Post Op Bleeding     Reports bleeding from buttock x 1 day. Also reports leaking wound vac. S/p I&D of pilonidal abscess on 2/6    Pain     Also c/o pain in buttock. Took tylenol 1000mg at 15:45       EXTERNAL RECORDS REVIEWED  Other reviewed a progress note from the nurse practitioner from Dr. Gomez who performed surgical evacuation of the pilonidal abscess on 6 February and the patient required hospitalization and she had a wound VAC since that time.    HPI/ROS    Ani Archuleta is a 18 y.o. female who presents with drainage from a wound VAC.  As mentioned above the patient had a pilonidal abscess drained surgically by Dr. Gomez on 6 February.  She has had a wound VAC since that time.  She is not currently on antibiotics.  She noticed some bleeding around the wound VAC site and has had some slight increase in discomfort to the site.  Therefore she presents for evaluation.  She does not have any fevers.  She does not have any abdominal pain.    PAST MEDICAL HISTORY   has a past medical history of Morbid obesity with BMI of 40.0-44.9, adult (HCC) (08/11/2021) and Situs inversus.    SURGICAL HISTORY   has a past surgical history that includes incision and drainage general (2/6/2024).    FAMILY HISTORY  Family History   Adopted: Yes   Family history unknown: Yes       SOCIAL HISTORY  Social History     Tobacco Use    Smoking status: Never    Smokeless tobacco: Never   Vaping Use    Vaping Use: Never used   Substance and Sexual Activity    Alcohol use: Not Currently     Comment: Admits to occasional drinking    Drug use: Never    Sexual activity: Yes     Partners: Male     Birth control/protection: Condom Male       CURRENT MEDICATIONS  Home Medications       Reviewed by Pamela Gibbs R.N. (Registered Nurse) on 02/25/24 at 1648  Med List Status: Partial     Medication Last Dose Status   acetaminophen (TYLENOL) 500 MG Tab  Active  "  celecoxib (CELEBREX) 200 MG Cap  Active   levonorgestrel (MIRENA, 52 MG,) 20 MCG/DAY IUD  Active                    ALLERGIES  No Known Allergies    PHYSICAL EXAM  VITAL SIGNS: /89   Pulse (!) 127   Temp 36.2 °C (97.2 °F) (Temporal)   Resp 18   Ht 1.727 m (5' 8\")   Wt 116 kg (256 lb 13.4 oz)   SpO2 96%   BMI 39.05 kg/m²    In general the patient appears uncomfortable but nontoxic    Pulmonary the patient's lungs are clear to auscultation bilaterally    Cardiovascular S1-S2 with a tachycardic rate    GI abdomen soft    Rectal examination the patient does have some purulent drainage and bleeding from the wound please see the media picture below      Skin the open wound in the superior gluteal fold as mentioned above    DIAGNOSTIC STUDIES   Results for orders placed or performed during the hospital encounter of 02/25/24   CBC WITH DIFFERENTIAL   Result Value Ref Range    WBC 9.3 4.8 - 10.8 K/uL    RBC 5.41 (H) 4.20 - 5.40 M/uL    Hemoglobin 15.6 12.0 - 16.0 g/dL    Hematocrit 46.1 37.0 - 47.0 %    MCV 85.2 81.4 - 97.8 fL    MCH 28.8 27.0 - 33.0 pg    MCHC 33.8 32.2 - 35.5 g/dL    RDW 39.8 35.9 - 50.0 fL    Platelet Count 327 164 - 446 K/uL    MPV 9.3 9.0 - 12.9 fL    Neutrophils-Polys 44.50 44.00 - 72.00 %    Lymphocytes 45.50 (H) 22.00 - 41.00 %    Monocytes 9.00 0.00 - 13.40 %    Eosinophils 0.50 0.00 - 6.90 %    Basophils 0.30 0.00 - 1.80 %    Immature Granulocytes 0.20 0.00 - 0.90 %    Nucleated RBC 0.00 0.00 - 0.20 /100 WBC    Neutrophils (Absolute) 4.11 1.82 - 7.42 K/uL    Lymphs (Absolute) 4.21 1.00 - 4.80 K/uL    Monos (Absolute) 0.83 0.00 - 0.85 K/uL    Eos (Absolute) 0.05 0.00 - 0.51 K/uL    Baso (Absolute) 0.03 0.00 - 0.12 K/uL    Immature Granulocytes (abs) 0.02 0.00 - 0.11 K/uL    NRBC (Absolute) 0.00 K/uL   COMP METABOLIC PANEL   Result Value Ref Range    Sodium 141 135 - 145 mmol/L    Potassium 4.3 3.6 - 5.5 mmol/L    Chloride 107 96 - 112 mmol/L    Co2 20 20 - 33 mmol/L    Anion Gap 14.0 " 7.0 - 16.0    Glucose 91 65 - 99 mg/dL    Bun 12 8 - 22 mg/dL    Creatinine 0.66 0.50 - 1.40 mg/dL    Calcium 9.3 8.5 - 10.5 mg/dL    Correct Calcium 9.0 8.5 - 10.5 mg/dL    AST(SGOT) 17 12 - 45 U/L    ALT(SGPT) 27 2 - 50 U/L    Alkaline Phosphatase 108 (H) 30 - 99 U/L    Total Bilirubin 0.3 0.1 - 1.5 mg/dL    Albumin 4.4 3.2 - 4.9 g/dL    Total Protein 6.9 6.0 - 8.2 g/dL    Globulin 2.5 1.9 - 3.5 g/dL    A-G Ratio 1.8 g/dL   ESTIMATED GFR   Result Value Ref Range    GFR (CKD-EPI) 129 >60 mL/min/1.73 m 2       RADIOLOGY  I have independently interpreted the diagnostic imaging associated with this visit and am waiting the final reading from the radiologist.   My preliminary interpretation is as follows: CT scan is reviewed and there is no deeper fluid collection continuous to the superficial evacuated abscess with the packing in place  Radiologist interpretation:   CT-PELVIS WITH   Final Result      Previously demonstrated complex cystic structure at the upper intergluteal cleft region is no longer present. There appears to be some packing material in this region. Correlate for interval operative resection.            COURSE & MEDICAL DECISION MAKING    This is a 18-year-old female who presents to the emergency room with some bleeding and discomfort at the site where she had a pilonidal cyst surgically evacuated by Dr. Gomez.  We did take down the wound VAC and the patient did have some purulent drainage as well as tenderness and therefore CT scan was performed to make sure there is no evidence of a deeper communicating abscess.  This was negative.  The patient's laboratory analysis has been reassuring.  She does not appear septic nor toxic.  I did send a Gram stain and culture from the wound.  The patient received clindamycin intravenously and will discharge home on clindamycin as she does have some purulent drainage.  She has an appointment for wound care tomorrow to have the wound VAC reapplied.  We placed a  wet-to-dry dressing and the patient be discharged home in stable condition..    FINAL DIAGNOSIS  Pilonidal abscess    Disposition  The patient will be discharged in stable condition       Electronically signed by: Bernardo Tran M.D., 2/25/2024 5:08 PM

## 2024-02-26 NOTE — WOUND TEAM
Wound team consulted to replace patient's wound vac.  Discussed with primary RN and a wet to dry dressing ordered.  Patient has an appointment with outpatient wound care clinic tomorrow for dressing re-application.  Consult completed at this time.

## 2024-02-26 NOTE — ED NOTES
Wound vac removed, erp to bedside to inspect wound, purulent drainage noted on wound, wound cleaned, wet to dry dressing applied to wound, IV started, blood sent to lab, wound culture sent to lab, pt resting in bed, pt states pain is tolerable at this time

## 2024-02-26 NOTE — ED TRIAGE NOTES
Chief Complaint   Patient presents with    Post Op Bleeding     Reports bleeding from buttock x 1 day. Also reports leaking wound vac. S/p I&D of pilonidal abscess on 2/6    Pain     Also c/o pain in buttock. Took tylenol 1000mg at 15:45     Educated on triage process. Instructed to notify staff for any worsening symptoms. Denies any recent travel. Denies exposure to known covid positive patients. Denies any respiratory symptoms.  Vitals:    02/25/24 1641   BP: 135/89   Pulse: (!) 127   Resp: 18   Temp: 36.2 °C (97.2 °F)   SpO2: 96%

## 2024-02-27 LAB
BACTERIA WND AEROBE CULT: NORMAL
GRAM STN SPEC: NORMAL
SIGNIFICANT IND 70042: NORMAL
SITE SITE: NORMAL
SOURCE SOURCE: NORMAL

## 2024-02-27 NOTE — PROGRESS NOTES
Provider Encounter- Full Thickness wound    HISTORY OF PRESENT ILLNESS  Wound History:    START OF CARE IN CLINIC: 2/26/2024    REFERRING PROVIDER: Sheri Lange      WOUND- Full Thickness Wound   LOCATION: Gluteal cleft   HISTORY:  18F with PMHx of Obesity, PCOS, Situs inversus totalis, pilonidal cyst s/p I&D. Patient was admitted 2/6-2/12 for worsening pilonidal cyst failing outpatient Abx therapy. Patient was taken to OR 2/6 for I&D of pilonidal cyst and associated abscess by Dr. Gomez and was treated with wound VAC. She was discharged on Abx therapy and was referred to [facility] for further wound care.    Pertinent Medical History: See above     TOBACCO USE:  Denies ever using    Patient's problem list, allergies, and current medications reviewed and updated in Epic    Interval History:  2/26/2024: Clinic visit with Naresh Hay MD. Patient reports doing ok. She reports that wound VAC had leak yesterday that she was unable to isolate and reported increased pain. She was seen in ED yesterday where they removed VAC, concerned for purulence so prescribed Clindamycin which she has yet to  or start. Preliminary wound culture obtained in ED was negative with rare skin tanner, final cultures are pending.    REVIEW OF SYSTEMS:   Review of Systems   Constitutional:  Negative for chills, fever and malaise/fatigue.   Skin:         Open surgical site pilonidal cyst       PHYSICAL EXAMINATION:   /64   Pulse (!) 118 Comment: RN notified  Temp 37 °C (98.6 °F) (Temporal)   Resp 18   SpO2 97%     Physical Exam  Constitutional:       General: She is not in acute distress.     Appearance: She is obese.   Cardiovascular:      Rate and Rhythm: Tachycardia present.   Pulmonary:      Effort: Pulmonary effort is normal. No respiratory distress.   Skin:     Comments: Open surgical wound gluteal cleft: Wound is deep with friable tissue, good viable tissue. No sough. No evidence of wound infection today.   Neurological:       Mental Status: She is alert.         WOUND ASSESSMENT  Wound 02/14/24 Full Thickness Wound Open Incision Gluteal Cleft (Active)   Wound Image   02/26/24 1700   Site Assessment Red 02/26/24 1700   Periwound Assessment Intact 02/23/24 1300   Margins Unattached edges 02/26/24 1700   Closure Secondary intention 02/21/24 1000   Drainage Amount Moderate 02/26/24 1700   Drainage Description Serosanguineous 02/26/24 1700   Treatments Cleansed 02/26/24 1700   Wound Cleansing Hypochlorus Acid 02/26/24 1700   Periwound Protectant Skin Protectant Wipes to Periwound;Drape 02/26/24 1700   Dressing Changed New 02/26/24 1700   Dressing Cleansing/Solutions Not Applicable 02/26/24 1700   Dressing Options Wound Vac;Dry Gauze 02/26/24 1700   Dressing Change/Treatment Frequency Monday, Wednesday, Friday, and As Needed 02/26/24 1700   Wound Team Following 3x Weekly 02/26/24 1700   Non-staged Wound Description Full thickness 02/26/24 1700   Wound Length (cm) 2.9 cm 02/26/24 1700   Wound Width (cm) 0.9 cm 02/26/24 1700   Wound Depth (cm) 2.3 cm 02/26/24 1700   Wound Surface Area (cm^2) 2.61 cm^2 02/26/24 1700   Wound Volume (cm^3) 6.003 cm^3 02/26/24 1700   Wound Healing % 37 02/26/24 1700   Tunneling (cm) 0 cm 02/26/24 1700   Undermining (cm) 0 cm 02/26/24 1700   Wound Odor None 02/26/24 1700   Exposed Structures None 02/26/24 1700   Number of days: 12       PROCEDURE:   - No excisional debridement required today      Pertinent Labs and Diagnostics:    Labs:     A1c:   Lab Results   Component Value Date/Time    HBA1C 4.7 05/25/2021 03:15 PM          IMAGING: None    VASCULAR STUDIES: None    LAST  WOUND CULTURE:  DATE :   Lab Results   Component Value Date/Time    CULTRSULT  02/25/2024 07:06 PM     Light growth site specific tanner includng lactose fermenting  Gram negative naila and Enteroccus species.           ASSESSMENT AND PLAN:     1. Sacrococcygeal pilonidal cyst with abscess    2/26/2024  - Patient s/p I&D of pilonidal cyst  complication by abscess 2/6 by Dr. Gomez  - Wound left open and being treated with wound VAC  - No need for excisional debridement today due to lack of slough  - Was seen in ED yesterday for malfunctioning VAC. ED prescribed clindamycin, she has not picked up yet, recommend she not take as there is no evidence of wound infection, wound culture in ED so far rare skin tanner. Will follow up results and recommend Abx as necessary.  - Continue wound VAC. Continue 3x weekly this week. If no issues and tolerating well, recommend she decrease to 2x weekly   Wound Care: NPWT -125mmHg continuous, with black foam    2. Morbid obesity with BMI of 40.0-44.9, adult (HCC)  - Risk for impaired wound healing    3. Situs inversus totalis    PATIENT EDUCATION  - Importance of adequate nutrition for wound healing  -Advised to go to ER for any increased redness, swelling, drainage, or odor, or if patient develops fever, chills, nausea or vomiting.     My total time spent caring for the patient on the day of the encounter was 30 minutes.   This does not include time spent on separately billable procedures/tests.       Please note that this note may have been created using voice recognition software. I have worked with technical experts from [facility] to optimize the interface.  I have made every reasonable attempt to correct obvious errors, but there may be errors of grammar and possibly content that I did not discover before finalizing the note.    N

## 2024-02-27 NOTE — PATIENT INSTRUCTIONS
-Keep your wound dressing clean, dry, and intact. Only change dressing if soiled or falls off.     -Remove your compression wrap if you have severe pain, severe swelling, numbness, color change, or temperature change in your toes. If you need to remove your compression wrap, do so by unrolling it. Do not cut the compression wrap off to prevent cutting yourself on accident.    -Wound vac may not have any drainage in tube or cannister & it will still be working.   Change cannister if it does become full by pressing tab on side of machine to remove canister and snap on new one. Full canister can be thrown in the trash. If cannister fills with bright red blood - go to ER. Dressing will be changed every MWF at the wound clinic.  If you are having issues with your wound VAC, please consider patching leaks, changing the canister, or calling [phone] for troubleshooting. If the wound VAC has been off or un-operational for over 2 hours, call wound care center to inform them and remove all dressings including black foam and replace with normal saline damp gauze.     -Should you experience any significant changes in your wound(s), such as infection (redness, swelling, localized heat, increased pain, fever > 101 F, chills) or have any questions regarding your home care instructions, please contact the wound center at [phone]. If after hours, contact your primary care physician or go to the hospital emergency room.

## 2024-02-28 ENCOUNTER — NON-PROVIDER VISIT (OUTPATIENT)
Dept: WOUND CARE | Facility: MEDICAL CENTER | Age: 19
End: 2024-02-28
Attending: NURSE PRACTITIONER
Payer: MEDICAID

## 2024-02-28 PROCEDURE — 97605 NEG PRS WND THER DME<=50SQCM: CPT

## 2024-02-28 NOTE — PATIENT INSTRUCTIONS
-Keep your wound dressing clean, dry, and intact. Only change dressing if it's over saturated, soiled or falls off.     -Change your dressing if it becomes soiled, soaked, or falls off.    -Wound vac may not have any drainage in tube or cannister & it will still be working.   Change cannister if it does become full by pressing tab on side of machine to remove canister and snap on new one. Full canister can be thrown in the trash. If cannister fills with bright red blood - go to ER. Dressing will be changed every MWF at the wound clini.  If you are having issues with your wound VAC, please consider patching leaks, changing the canister, or calling [phone] for troubleshooting. If the wound VAC has been off or un-operational for over 2 hours, call wound care center to inform them and remove all dressings including black foam and replace with normal saline damp gauze.     -Should you experience any significant changes in your wound(s), such as infection (redness, swelling, localized heat, increased pain, fever > 101 F, chills) or have any questions regarding your home care instructions, please contact the wound center at [phone]. If after hours, contact your primary care physician or go to the hospital emergency room.

## 2024-02-28 NOTE — PROGRESS NOTES
- NPWT dressing changed to coccyx wound, < 50 cm ².   - 2 pieces of black foam removed. No residual foam noted in wound bed.   - 1 piece of black foam used.   - Initiated continuous suction at 125 mm Hg, no leaks noted.    Scotty gauze wrapped around tubing to help with offloading. Patient's skin does not tolerate sacral dressing.

## 2024-03-01 ENCOUNTER — NON-PROVIDER VISIT (OUTPATIENT)
Dept: WOUND CARE | Facility: MEDICAL CENTER | Age: 19
End: 2024-03-01
Attending: NURSE PRACTITIONER
Payer: MEDICAID

## 2024-03-01 PROCEDURE — 97605 NEG PRS WND THER DME<=50SQCM: CPT

## 2024-03-01 NOTE — PATIENT INSTRUCTIONS
-Keep your wound dressing clean, dry, and intact. Only change dressing if it's over saturated, soiled or falls off.     -Wound vac may not have any drainage in tube or cannister & it will still be working.   Change cannister if it does become full by pressing tab on side of machine to remove canister and snap on new one. Full canister can be thrown in the trash. If cannister fills with bright red blood - go to ER. Dressing will be changed every MWF at the wound clini.  If you are having issues with your wound VAC, please consider patching leaks, changing the canister, or calling [phone] for troubleshooting. If the wound VAC has been off or un-operational for over 2 hours, call wound care center to inform them and remove all dressings including black foam and replace with normal saline damp gauze.     -Should you experience any significant changes in your wound(s), such as infection (redness, swelling, localized heat, increased pain, fever > 101 F, chills) or have any questions regarding your home care instructions, please contact the wound center at [phone]. If after hours, contact your primary care physician or go to the hospital emergency room.

## 2024-03-04 ENCOUNTER — NON-PROVIDER VISIT (OUTPATIENT)
Dept: WOUND CARE | Facility: MEDICAL CENTER | Age: 19
End: 2024-03-04
Attending: NURSE PRACTITIONER
Payer: MEDICAID

## 2024-03-04 PROCEDURE — 97605 NEG PRS WND THER DME<=50SQCM: CPT

## 2024-03-04 NOTE — PROGRESS NOTES
- NPWT dressing changed to coccyx wound, < 50 cm ².   - 2 pieces of black foam removed. No residual foam noted in wound bed.   - 1 piece of black foam used.   - Initiated continuous suction at 125 mm Hg, no leaks noted.

## 2024-03-04 NOTE — PATIENT INSTRUCTIONS
-Keep your wound dressing clean, dry, and intact. Only change dressing if it's over saturated, soiled or falls off.     -Change your dressing if it becomes soiled, soaked, or falls off.    -Wound vac may not have any drainage in tube or cannister & it will still be working.   Change cannister if it does become full by pressing tab on side of machine to remove canister and snap on new one. Full canister can be thrown in the trash. If cannister fills with bright red blood - go to ER. Dressing will be changed every MWF at the wound clini.  If you are having issues with your wound VAC, please consider patching leaks, changing the canister, or calling [phone] for troubleshooting. If the wound VAC has been off or un-operational for over 2 hours, call wound care center to inform them and remove all dressings including black foam and replace with normal saline damp gauze.     -Should you experience any significant changes in your wound(s), such as infection (redness, swelling, localized heat, increased pain, fever > 101 F, chills) or have any questions regarding your home care instructions, please contact the wound center at [phone]. If after hours, contact your primary care physician or go to the hospital emergency room.

## 2024-03-06 ENCOUNTER — NON-PROVIDER VISIT (OUTPATIENT)
Dept: WOUND CARE | Facility: MEDICAL CENTER | Age: 19
End: 2024-03-06
Attending: NURSE PRACTITIONER
Payer: MEDICAID

## 2024-03-06 PROCEDURE — 97605 NEG PRS WND THER DME<=50SQCM: CPT

## 2024-03-06 NOTE — PATIENT INSTRUCTIONS
After Visit Summary Wound Care Instructions    Nutrition - Patient instructed increased protein diet unless contraindicated in renal failure (meat, eggs, fish, yogurt, cottage cheese, beans), use of multivitamin with minerals and Arginaid supplementation (check if ok with Primary Care Provider first).    Infection -  instructed signs and symptoms of infection, increased redness and swelling, localized heat over wound and surrounding area/fever/chills/nausea and vomiting, when to call doctor or go to Emergency Room.     Dressing Changes - Instructed patient rationale for wound care products. Instructed to keep dressings clean and dry, shower on clinic days right before coming in. Change your dressing if it becomes soiled, soaked, or falls off.    Wound VAC may not have any drainage in tube or canister & it will still be working.   Change canister if it does become full by pressing tab on side of machine to remove canister and snap on new one. Full canister can be thrown in the trash. If canister fills with bright red blood, turn machine off and go to Emergency Room immediately. Dressing will be changed every Monday, Wednesday and Friday at the wound clinic.  If you are having issues with your wound VAC, please consider patching leaks, changing the canister, or calling [phone] for troubleshooting. If the wound VAC has been off or un-operational for over 2 hours, call wound care center to inform them and remove all dressings including black foam and replace with normal saline damp gauze, then dry gauze over that, secure with tape.       Questions - should you have any questions regarding your home care instructions, please contact the wound center at [phone]. If after hours, contact your primary care physician or go to the hospital emergency room.

## 2024-03-06 NOTE — PROGRESS NOTES
Wound vac dressing change.  1 piece of regular black foam removed  2 pieces of black foam replaced.  I to wound bed and 1 for trac pad.  Paste ring used.  Dry guaze placed around tubing and secured with tape.   Vac restarted at continuous suction at 125 mmHg. No leaks detected.

## 2024-03-08 ENCOUNTER — NON-PROVIDER VISIT (OUTPATIENT)
Dept: WOUND CARE | Facility: MEDICAL CENTER | Age: 19
End: 2024-03-08
Attending: NURSE PRACTITIONER
Payer: MEDICAID

## 2024-03-08 PROCEDURE — 99211 OFF/OP EST MAY X REQ PHY/QHP: CPT

## 2024-03-08 NOTE — PROGRESS NOTES
Wound vac held this visit per patient preference.  Instructed patient that wound vac break can last for 2 weeks before it needs to be returned to [facility].   [facility] notified of wound vac break via [employer].

## 2024-03-08 NOTE — PATIENT INSTRUCTIONS
----- Message from Margareth Romero sent at 1/29/2024  4:27 PM CST -----  Contact: Self [phone]  Patient would like to get a referral.    Referral to what specialty:  Ophthalmology    Does the patient want the referral with a specific physician:  none    [facility] the [facility]     Reason (be specific):  eye exam    Does the patient already have the specialty clinic appointment scheduled:  No    If yes, what date is the appointment scheduled:       Is the insurance listed in Epic correct? (this is important for a referral):  Yes     Would the patient like a call back, or a response through their [facility] portal?:   call back       -Keep dressings clean and dry. Change dressings every 3-4 days, and if they become over saturated, soiled or fall off.     -If you need to change your dressings at home: Wash your wound with normal saline, wound cleanser, or unscented soap and water prior to applying your new dressings. Please avoid cleansing with hydrogen peroxide or rubbing alcohol.    -Should you experience any significant changes in your wound(s), such as signs of infection (increasing redness, swelling, localized heat, increased pain, fever > 101 F, chills) or have any questions regarding your home care instructions, please contact the wound center at [phone]. If after hours, contact your primary care physician or go to the hospital emergency room.     -If you are 5 or more minutes late for an appointment, we reserve the right to cancel and reschedule that appointment. Additionally, if you are habitually late or not showing (3 late cancellations and/or no shows), we reserve the right to cancel your remaining appointments and it will be your responsibility to obtain a new referral if services are still needed.

## 2024-03-11 ENCOUNTER — NON-PROVIDER VISIT (OUTPATIENT)
Dept: WOUND CARE | Facility: MEDICAL CENTER | Age: 19
End: 2024-03-11
Attending: NURSE PRACTITIONER
Payer: MEDICAID

## 2024-03-11 DIAGNOSIS — L05.01 SACROCOCCYGEAL PILONIDAL CYST WITH ABSCESS: ICD-10-CM

## 2024-03-11 PROCEDURE — 97602 WOUND(S) CARE NON-SELECTIVE: CPT

## 2024-03-11 NOTE — PATIENT INSTRUCTIONS
After Visit Summary Wound Care Instructions    Nutrition - Patient instructed increased protein diet unless contraindicated in renal failure (meat, eggs, fish, yogurt, cottage cheese, beans), use of multivitamin with minerals and Arginaid supplementation (check if ok with Primary Care Provider first).    Infection -  instructed signs and symptoms of infection, increased redness and swelling, localized heat over wound and surrounding area/fever/chills/nausea and vomiting, when to call doctor or go to Emergency Room.     Dressing Changes -  Instructed to keep dressings clean and dry, change every 48 hrs. shower on clinic days right before coming in. Change your dressing if it becomes soiled, soaked, or falls off.    Discharge Instructions - discharge today as wound is resolved; new skin tissue over wound area will be fragile for a few days, bathe and dry area gently, only ever regains a maximum of 80% of the tensile strength of the surrounding skin, remodeling of scar can continue for 6 months to a year. Contact Primary Care Provider for a referral back here if any problems with area opening and draining again.    Questions - should you have any questions regarding your home care instructions, please contact the wound center at [phone]. If after hours, contact your primary care physician or go to the hospital emergency room.

## 2024-03-11 NOTE — PROGRESS NOTES
Continue with Wound Vac hold for wound progression. Pt instructed to change dressing every 2 days as needed.  Pt has dressing supplies and understands instructions.  Pt will hold onto vac until next week.

## 2024-03-13 ENCOUNTER — APPOINTMENT (OUTPATIENT)
Dept: WOUND CARE | Facility: MEDICAL CENTER | Age: 19
End: 2024-03-13
Attending: NURSE PRACTITIONER
Payer: MEDICAID

## 2024-03-15 ENCOUNTER — APPOINTMENT (OUTPATIENT)
Dept: WOUND CARE | Facility: MEDICAL CENTER | Age: 19
End: 2024-03-15
Attending: NURSE PRACTITIONER
Payer: MEDICAID

## 2024-03-18 ENCOUNTER — NON-PROVIDER VISIT (OUTPATIENT)
Dept: WOUND CARE | Facility: MEDICAL CENTER | Age: 19
End: 2024-03-18
Attending: NURSE PRACTITIONER
Payer: MEDICAID

## 2024-03-18 PROCEDURE — 97602 WOUND(S) CARE NON-SELECTIVE: CPT

## 2024-03-18 NOTE — PROGRESS NOTES
Patient did not have vac with her today. Non-Selective Debridement with puracyn spray and gauze to debride non-viable tissue from the wound bed and periwound areas. Refer to flow sheet for wound care details. Patient tolerated the procedure well.

## 2024-03-18 NOTE — PATIENT INSTRUCTIONS
-Keep your wound dressing clean, dry, and intact. Only change dressing if it's over saturated, soiled or falls off.     -Change your dressing if it becomes soiled, soaked, or falls off.    -Should you experience any significant changes in your wound(s), such as infection (redness, swelling, localized heat, increased pain, fever > 101 F, chills) or have any questions regarding your home care instructions, please contact the wound center at [phone]. If after hours, contact your primary care physician or go to the hospital emergency room.

## 2024-03-20 ENCOUNTER — APPOINTMENT (OUTPATIENT)
Dept: WOUND CARE | Facility: MEDICAL CENTER | Age: 19
End: 2024-03-20
Attending: NURSE PRACTITIONER
Payer: MEDICAID

## 2024-03-22 ENCOUNTER — APPOINTMENT (OUTPATIENT)
Dept: WOUND CARE | Facility: MEDICAL CENTER | Age: 19
End: 2024-03-22
Attending: NURSE PRACTITIONER
Payer: MEDICAID

## 2024-03-25 ENCOUNTER — NON-PROVIDER VISIT (OUTPATIENT)
Dept: WOUND CARE | Facility: MEDICAL CENTER | Age: 19
End: 2024-03-25
Attending: NURSE PRACTITIONER
Payer: MEDICAID

## 2024-03-25 DIAGNOSIS — E66.01 MORBID OBESITY WITH BMI OF 40.0-44.9, ADULT (HCC): ICD-10-CM

## 2024-03-25 DIAGNOSIS — Q89.3 SITUS INVERSUS TOTALIS: ICD-10-CM

## 2024-03-25 DIAGNOSIS — L05.01 SACROCOCCYGEAL PILONIDAL CYST WITH ABSCESS: ICD-10-CM

## 2024-03-25 PROCEDURE — 99211 OFF/OP EST MAY X REQ PHY/QHP: CPT

## 2024-03-25 NOTE — PATIENT INSTRUCTIONS
After Visit Summary Wound Care Instructions    Discharge Instructions - discharge today as wound is resolved; new skin tissue over wound area will be fragile for a few days, bathe and dry area gently, only ever regains a maximum of 80% of the tensile strength of the surrounding skin, remodeling of scar can continue for 6 months to a year. Contact Primary Care Provider for a referral back here if any problems with area opening and draining again.    Questions - should you have any questions regarding your home care instructions, please contact the wound center at [phone]. If after hours, contact your primary care physician or go to the hospital emergency room.

## 2024-03-25 NOTE — PROGRESS NOTES
Wound has totally resolved with new epithelial tissue.  Wound area cleansed with puracyn and dry gauze.   Instructed Pt on new fragile skin and to protect area.    If wound re-opens to get a new referral to see wound clinic.  Pt said she is returning the wound vac today.  
no

## 2024-03-29 ENCOUNTER — HOSPITAL ENCOUNTER (EMERGENCY)
Facility: MEDICAL CENTER | Age: 19
End: 2024-03-30
Attending: STUDENT IN AN ORGANIZED HEALTH CARE EDUCATION/TRAINING PROGRAM
Payer: MEDICAID

## 2024-03-29 VITALS
HEART RATE: 98 BPM | WEIGHT: 263.89 LBS | SYSTOLIC BLOOD PRESSURE: 142 MMHG | RESPIRATION RATE: 18 BRPM | TEMPERATURE: 98.2 F | OXYGEN SATURATION: 96 % | DIASTOLIC BLOOD PRESSURE: 88 MMHG | BODY MASS INDEX: 39.99 KG/M2 | HEIGHT: 68 IN

## 2024-03-29 DIAGNOSIS — R06.00 DYSPNEA, UNSPECIFIED TYPE: ICD-10-CM

## 2024-03-29 DIAGNOSIS — J02.9 PHARYNGITIS, UNSPECIFIED ETIOLOGY: ICD-10-CM

## 2024-03-29 LAB — S PYO DNA SPEC NAA+PROBE: NOT DETECTED

## 2024-03-29 PROCEDURE — 99283 EMERGENCY DEPT VISIT LOW MDM: CPT

## 2024-03-29 PROCEDURE — 87651 STREP A DNA AMP PROBE: CPT

## 2024-03-29 PROCEDURE — A9270 NON-COVERED ITEM OR SERVICE: HCPCS | Mod: UD | Performed by: STUDENT IN AN ORGANIZED HEALTH CARE EDUCATION/TRAINING PROGRAM

## 2024-03-29 PROCEDURE — 700102 HCHG RX REV CODE 250 W/ 637 OVERRIDE(OP): Mod: UD | Performed by: STUDENT IN AN ORGANIZED HEALTH CARE EDUCATION/TRAINING PROGRAM

## 2024-03-29 RX ORDER — DEXAMETHASONE 4 MG/1
12 TABLET ORAL ONCE
Status: COMPLETED | OUTPATIENT
Start: 2024-03-29 | End: 2024-03-29

## 2024-03-29 RX ORDER — IBUPROFEN 200 MG
400 TABLET ORAL ONCE
Status: COMPLETED | OUTPATIENT
Start: 2024-03-29 | End: 2024-03-29

## 2024-03-29 RX ADMIN — DEXAMETHASONE 12 MG: 4 TABLET ORAL at 22:50

## 2024-03-29 RX ADMIN — IBUPROFEN 400 MG: 200 TABLET, FILM COATED ORAL at 22:50

## 2024-03-29 ASSESSMENT — FIBROSIS 4 INDEX: FIB4 SCORE: 0.19

## 2024-03-30 NOTE — DISCHARGE INSTRUCTIONS
You can take 1000 mg of acetaminophen every 6 hours in addition to 400 mg of ibuprofen every 6 hours for pain as needed. Use of ibuprofen should be limited to less than 2 weeks.

## 2024-03-30 NOTE — ED TRIAGE NOTES
"Chief Complaint   Patient presents with    Sore Throat     Difficulty swallowing  Mild difficulty in breathing       Pt presented to ED with above mentioned complaints. Pt stated having sore throat, difficulty swallowing since yesterday. Pt feels like her tonsils are swollen. Pt received 1000 mg tylenol at home PTA.     BP (!) 151/95   Pulse (!) 104   Temp 36.8 °C (98.2 °F) (Temporal)   Resp 16   Ht 1.727 m (5' 8\")   Wt 120 kg (263 lb 14.3 oz)   SpO2 95%   BMI 40.12 kg/m²     "

## 2024-03-31 NOTE — ED PROVIDER NOTES
CHIEF COMPLAINT  Chief Complaint   Patient presents with    Sore Throat     Difficulty swallowing  Mild difficulty in breathing         LIMITATION TO HISTORY   Select:     DOLORES Archuleta is a 18 y.o. female who presents to the Emergency Department for evaluation of sore throat some difficulty swallowing and some mild difficulty breathing with a nonproductive cough for the past 24 hours with fevers and tenderness in the right anterior upper neck she has a history of recurrent strep pharyngitis is planning on obtaining tonsillectomy next month    OUTSIDE HISTORIAN(S):  Select:    EXTERNAL RECORDS REVIEWED  Select:       PAST MEDICAL HISTORY  Past Medical History:   Diagnosis Date    Morbid obesity with BMI of 40.0-44.9, adult ([facility]) 08/11/2021    Situs inversus      .    SURGICAL HISTORY  Past Surgical History:   Procedure Laterality Date    INCISION AND DRAINAGE GENERAL  2/6/2024    Procedure: INCISION AND DRAINAGE OF PILONIDAL ABSCESS;  Surgeon: Jeremy Gomez M.D.;  Location: SURGERY [facility];  Service: General         FAMILY HISTORY  Family History   Adopted: Yes   Family history unknown: Yes          SOCIAL HISTORY  Social History     Socioeconomic History    Marital status: Single     Spouse name: Not on file    Number of children: 0    Years of education: Not on file    Highest education level: High school graduate   Occupational History     Comment:    Tobacco Use    Smoking status: Never    Smokeless tobacco: Never   Vaping Use    Vaping Use: Never used   Substance and Sexual Activity    Alcohol use: Not Currently     Comment: Admits to occasional drinking    Drug use: Never    Sexual activity: Yes     Partners: Male     Birth control/protection: Condom Male   Other Topics Concern    Interpersonal relationships Not Asked    Poor school performance Not Asked    Reading difficulties Not Asked    Speech difficulties Not Asked    Writing difficulties Not Asked    Inadequate sleep Not  "Asked    Excessive TV viewing Not Asked    Excessive video game use Not Asked    Inadequate exercise Not Asked    Sports related Not Asked    Poor diet Not Asked    Second-hand smoke exposure Not Asked    Family concerns for drug/alcohol abuse Not Asked    Violence concerns Not Asked    Poor oral hygiene Not Asked    Bike safety Not Asked    Family concerns vehicle safety Not Asked    Behavioral problems Not Asked    Sad or not enjoying activities Not Asked    Suicidal thoughts Not Asked   Social History Narrative    Lives with parents. Has 3 dogs, GSD mix, aguilar doodle, and husky mix. And 2 birds     Social Determinants of Health     Financial Resource Strain: Not on file   Food Insecurity: Not on file   Transportation Needs: Not on file   Physical Activity: Not on file   Stress: Not on file   Social Connections: Not on file   Intimate Partner Violence: Not on file   Housing Stability: Not on file         CURRENT MEDICATIONS  No current facility-administered medications on file prior to encounter.     Current Outpatient Medications on File Prior to Encounter   Medication Sig Dispense Refill    acetaminophen (TYLENOL) 500 MG Tab Take 2 Tablets by mouth every 6 hours as needed for Mild Pain or Fever.      celecoxib (CELEBREX) 200 MG Cap Take 1 Capsule by mouth every 12 hours as needed for Mild Pain or Inflammation. 14 Capsule 0    levonorgestrel (MIRENA, 52 MG,) 20 MCG/DAY IUD 1 Each by Intrauterine route one time.             ALLERGIES  No Known Allergies    PHYSICAL EXAM  VITAL SIGNS:BP (!) 142/88   Pulse 98   Temp 36.8 °C (98.2 °F) (Temporal)   Resp 18   Ht 1.727 m (5' 8\")   Wt 120 kg (263 lb 14.3 oz)   SpO2 96%   BMI 40.12 kg/m²       GENERAL: Awake and alert  HEAD: Normocephalic and atraumatic  NECK: Normal range of motion, without meningismus, single tender lymphadenopathy anterior superior lymph node chain less than 1 cm in size rubbery mobile  EYES: Pupils Equal, Round, Reactive to Light, extraocular " movements intact, conjunctiva white  ENT: Mucous membranes moist, oropharynx shows some erythema of the bilateral tonsils, tympanic membranes gray and normal in appearance bilaterally, uvula is midline  PULMONARY: Normal effort, clear to auscultation  CARDIOVASCULAR: No murmurs, clicks or rubs, peripheral pulses 2+  ABDOMINAL: Soft, non-tender, no guarding or rigidity present, no pulsatile masses  BACK: no midline tenderness, no costovertebral tenderness  NEUROLOGICAL: Grossly non-focal neurological examination, speech normal, gait normal  EXTREMITIES: No edema, normal to inspection  SKIN: Warm and dry.  PSYCHIATRIC: Affect is appropriate    DIAGNOSTIC STUDIES / PROCEDURES  EKG        LABS  Labs Reviewed   GROUP A STREP BY PCR          COURSE & MEDICAL DECISION MAKING    ED COURSE:        INTERVENTIONS BY ME:  Medications   dexamethasone (Decadron) tablet 12 mg (12 mg Oral Given 3/29/24 2250)   ibuprofen (Motrin) tablet 400 mg (400 mg Oral Given 3/29/24 2250)       Response on recheck:      INITIAL ASSESSMENT, COURSE AND PLAN  Care Narrative:     Upon my interpretation of this patients symptomatology, examination, and studies performed today, this patient´s presentation is not consistent with Enmanuel´s angina, peritonsillar abscess, angioedema, foreign body aspiration, or any other emergency medical conditions. Patient agreed with plan of care with reassuring examination, Suitable for outpatient management.    Patient had normal vital signs at time my evaluation no shortness of breath at rest normal pulmonary exam do not suspect pneumonia do not see any signs of oropharyngeal compromise that would lead to difficulty with respiration did consider obtaining CBC CMP EKG and CT scan to evaluate for more sinister pathology her feel patient is HPI and examination safe excludes these conditions and patient has minimal comorbid conditions for discharge home with return precautions as appropriate at this time.  Patient is  lymph node is consistent with pharyngitis     ADDITIONAL PROBLEM LIST    DISPOSITION AND DISCUSSIONS  Discussion of management with other QHP or appropriate source(s): None     Escalation     Last time she was hospitalizedof care considered, and ultimately not performed:Laboratory analysis and diagnostic imaging      FINAL DIAGNOSIS  1. Pharyngitis, unspecified etiology    2. Dyspnea, unspecified type             Electronically signed by: Poncho Fang DO ,5:12 PM 03/30/24

## 2024-04-01 ENCOUNTER — APPOINTMENT (OUTPATIENT)
Dept: WOUND CARE | Facility: MEDICAL CENTER | Age: 19
End: 2024-04-01
Attending: NURSE PRACTITIONER
Payer: MEDICAID

## 2024-04-08 ENCOUNTER — APPOINTMENT (OUTPATIENT)
Dept: WOUND CARE | Facility: MEDICAL CENTER | Age: 19
End: 2024-04-08
Attending: NURSE PRACTITIONER
Payer: MEDICAID

## 2024-04-15 ENCOUNTER — APPOINTMENT (OUTPATIENT)
Dept: WOUND CARE | Facility: MEDICAL CENTER | Age: 19
End: 2024-04-15
Attending: NURSE PRACTITIONER
Payer: MEDICAID

## 2024-04-22 ENCOUNTER — APPOINTMENT (OUTPATIENT)
Dept: WOUND CARE | Facility: MEDICAL CENTER | Age: 19
End: 2024-04-22
Attending: NURSE PRACTITIONER
Payer: MEDICAID

## 2024-04-29 ENCOUNTER — APPOINTMENT (OUTPATIENT)
Dept: WOUND CARE | Facility: MEDICAL CENTER | Age: 19
End: 2024-04-29
Attending: NURSE PRACTITIONER
Payer: MEDICAID

## 2024-05-15 ENCOUNTER — NON-PROVIDER VISIT (OUTPATIENT)
Dept: URGENT CARE | Facility: PHYSICIAN GROUP | Age: 19
End: 2024-05-15

## 2024-05-15 DIAGNOSIS — Z02.1 PRE-EMPLOYMENT DRUG SCREENING: ICD-10-CM

## 2024-05-15 LAB
AMP AMPHETAMINE: NORMAL
COC COCAINE: NORMAL
INT CON NEG: NORMAL
INT CON POS: NORMAL
MET METHAMPHETAMINES: NORMAL
OPI OPIATES: NORMAL
PCP PHENCYCLIDINE: NORMAL
POC DRUG COMMENT 753798-OCCUPATIONAL HEALTH: NORMAL
THC: NORMAL

## 2024-05-15 PROCEDURE — 80305 DRUG TEST PRSMV DIR OPT OBS: CPT | Performed by: FAMILY MEDICINE

## 2024-06-17 ENCOUNTER — APPOINTMENT (OUTPATIENT)
Dept: RADIOLOGY | Facility: IMAGING CENTER | Age: 19
End: 2024-06-17
Attending: NURSE PRACTITIONER
Payer: MEDICAID

## 2024-06-17 ENCOUNTER — OFFICE VISIT (OUTPATIENT)
Dept: URGENT CARE | Facility: PHYSICIAN GROUP | Age: 19
End: 2024-06-17
Payer: MEDICAID

## 2024-06-17 VITALS
RESPIRATION RATE: 16 BRPM | OXYGEN SATURATION: 98 % | DIASTOLIC BLOOD PRESSURE: 72 MMHG | BODY MASS INDEX: 30.92 KG/M2 | HEIGHT: 68 IN | WEIGHT: 204 LBS | TEMPERATURE: 97.9 F | SYSTOLIC BLOOD PRESSURE: 108 MMHG | HEART RATE: 93 BPM

## 2024-06-17 DIAGNOSIS — M25.512 ACUTE PAIN OF LEFT SHOULDER: ICD-10-CM

## 2024-06-17 PROCEDURE — 3078F DIAST BP <80 MM HG: CPT | Performed by: NURSE PRACTITIONER

## 2024-06-17 PROCEDURE — 73030 X-RAY EXAM OF SHOULDER: CPT | Mod: TC,FY,LT | Performed by: NURSE PRACTITIONER

## 2024-06-17 PROCEDURE — 3074F SYST BP LT 130 MM HG: CPT | Performed by: NURSE PRACTITIONER

## 2024-06-17 PROCEDURE — 99213 OFFICE O/P EST LOW 20 MIN: CPT | Performed by: NURSE PRACTITIONER

## 2024-06-17 RX ORDER — METHOCARBAMOL 500 MG/1
1000 TABLET, FILM COATED ORAL 4 TIMES DAILY
Qty: 60 TABLET | Refills: 0 | Status: SHIPPED | OUTPATIENT
Start: 2024-06-17 | End: 2024-06-27

## 2024-06-17 RX ORDER — KETOROLAC TROMETHAMINE 30 MG/ML
15 INJECTION, SOLUTION INTRAMUSCULAR; INTRAVENOUS ONCE
Status: COMPLETED | OUTPATIENT
Start: 2024-06-17 | End: 2024-06-17

## 2024-06-17 RX ADMIN — KETOROLAC TROMETHAMINE 15 MG: 30 INJECTION, SOLUTION INTRAMUSCULAR; INTRAVENOUS at 18:55

## 2024-06-17 ASSESSMENT — FIBROSIS 4 INDEX: FIB4 SCORE: 0.19

## 2024-06-17 NOTE — LETTER
June 17, 2024    To Whom It May Concern:         This is confirmation that Ani Archuleta attended her scheduled appointment with RONNIE Geiger on 6/17/24. Please excuse her absence due to an acute condition. She may return to work on6/20/2024 or sooner if better.          If you have any questions please do not hesitate to call me at the phone number listed below.    Sincerely,          JANET Geiger.  [phone]

## 2024-06-18 NOTE — PROGRESS NOTES
"Subjective:     Ani Archuleta is a 18 y.o. female who presents for Shoulder Pain ((L) x 3 days.  No known injury. )      Shoulder Pain    Pt presents for evaluation of a new problem. Ani is a 18-year-old female who presents to urgent care today with complaints of left-sided shoulder pain that has been ongoing since yesterday.  She denies any recent heavy lifting or any known injury.  Her pain has worsened today and she is now unable to lift her arm due to severe pain.  She has been using Tylenol, ibuprofen and IcyHot in addition to heat for relief of her discomfort.    ROS    PMH:   Past Medical History:   Diagnosis Date   • Morbid obesity with BMI of 40.0-44.9, adult ([facility]) 08/11/2021   • Situs inversus      ALLERGIES: No Known Allergies  SURGHX:   Past Surgical History:   Procedure Laterality Date   • INCISION AND DRAINAGE GENERAL  2/6/2024    Procedure: INCISION AND DRAINAGE OF PILONIDAL ABSCESS;  Surgeon: Jeremy Gomez M.D.;  Location: SURGERY [facility];  Service: General     SOCHX:   Social History     Socioeconomic History   • Marital status: Single   • Number of children: 0   • Highest education level: High school graduate   Occupational History     Comment:    Tobacco Use   • Smoking status: Never   • Smokeless tobacco: Never   Vaping Use   • Vaping status: Never Used   Substance and Sexual Activity   • Alcohol use: Not Currently     Comment: Admits to occasional drinking   • Drug use: Never   • Sexual activity: Yes     Partners: Male     Birth control/protection: Condom Male   Social History Narrative    Lives with parents. Has 3 dogs, GSD mix, aguilar doodle, and husky mix. And 2 birds     FH:   Family History   Adopted: Yes   Family history unknown: Yes         Objective:   /72   Pulse 93   Temp 36.6 °C (97.9 °F) (Temporal)   Resp 16   Ht 1.727 m (5' 8\")   Wt 92.5 kg (204 lb)   SpO2 98%   BMI 31.02 kg/m²     Physical Exam  Vitals and nursing note reviewed. "   Constitutional:       General: She is not in acute distress.     Appearance: Normal appearance. She is not ill-appearing.   HENT:      Head: Normocephalic and atraumatic.      Right Ear: External ear normal.      Left Ear: External ear normal.      Nose: No congestion or rhinorrhea.      Mouth/Throat:      Mouth: Mucous membranes are moist.   Eyes:      Extraocular Movements: Extraocular movements intact.      Pupils: Pupils are equal, round, and reactive to light.   Cardiovascular:      Rate and Rhythm: Normal rate and regular rhythm.      Pulses: Normal pulses.      Heart sounds: Normal heart sounds.   Pulmonary:      Effort: Pulmonary effort is normal.      Breath sounds: Normal breath sounds.   Abdominal:      General: Abdomen is flat. Bowel sounds are normal.      Palpations: Abdomen is soft.   Musculoskeletal:      Left shoulder: Tenderness and bony tenderness present. No swelling, deformity, effusion or laceration. Decreased range of motion. Decreased strength.      Cervical back: Normal range of motion and neck supple.      Comments: Unable to perform neuroexam due to patient's pain.  Range of motion limited to 30 degrees.  Unable to perform painful arc test, straight arm raise or Apley scratch test maneuver.   Skin:     General: Skin is warm and dry.      Capillary Refill: Capillary refill takes less than 2 seconds.   Neurological:      General: No focal deficit present.      Mental Status: She is alert and oriented to person, place, and time. Mental status is at baseline.   Psychiatric:         Mood and Affect: Mood normal.         Behavior: Behavior normal.         Thought Content: Thought content normal.         Judgment: Judgment normal.     DX-SHOULDER 2+ LEFT    Result Date: 6/17/2024 6/17/2024 6:45 PM HISTORY/REASON FOR EXAM:  Pain/Deformity Following Trauma. TECHNIQUE/EXAM DESCRIPTION AND NUMBER OF VIEWS:  3 views of the LEFT shoulder. COMPARISON: None FINDINGS: Bones: Normal bone mineralization.  No fracture. No focal bone lesion. Joints: Glenohumeral and acromioclavicular joints are intact. No subluxation or joint effusion. Soft tissues: Unremarkable.     Normal left shoulder radiography.     Assessment/Plan:   Assessment      1. Acute pain of left shoulder  ketorolac (Toradol) injection 15 mg    DX-SHOULDER 2+ LEFT    methocarbamol (ROBAXIN) 500 MG Tab    Referral to Orthopedics      Patient placed in arm sling for support and comfort.  She was encouraged to perform gentle range of motion exercises and to remove sling several times during the day to prevent shoulder stiffness.  X-ray normal in clinic today.  She was referred to follow-up with orthopedics for further treatment and evaluation. Toradol injection given in clinic and pt tolerated this well. Pt was advised to refrain from additional NSAIDS for the next 48 hours following this injection. Acetaminophen may be used every 4 hours as needed for additional relief of pain. Pt educated not to exceed more than advised amount on bottle.   Robaxin also sent to pharmacy for relief of spasm and pain.  Drowsy side effects discussed with patient.

## 2024-07-20 ENCOUNTER — HOSPITAL ENCOUNTER (EMERGENCY)
Facility: MEDICAL CENTER | Age: 19
End: 2024-07-21
Attending: STUDENT IN AN ORGANIZED HEALTH CARE EDUCATION/TRAINING PROGRAM
Payer: MEDICAID

## 2024-07-20 DIAGNOSIS — R19.7 DIARRHEA, UNSPECIFIED TYPE: ICD-10-CM

## 2024-07-20 DIAGNOSIS — E86.0 DEHYDRATION: ICD-10-CM

## 2024-07-20 DIAGNOSIS — R11.2 NAUSEA AND VOMITING, UNSPECIFIED VOMITING TYPE: ICD-10-CM

## 2024-07-20 DIAGNOSIS — R10.9 ABDOMINAL PAIN, UNSPECIFIED ABDOMINAL LOCATION: ICD-10-CM

## 2024-07-20 LAB
ALBUMIN SERPL BCP-MCNC: 4.1 G/DL (ref 3.2–4.9)
ALBUMIN/GLOB SERPL: 1.6 G/DL
ALP SERPL-CCNC: 136 U/L (ref 30–99)
ALT SERPL-CCNC: 24 U/L (ref 2–50)
ANION GAP SERPL CALC-SCNC: 15 MMOL/L (ref 7–16)
APPEARANCE UR: CLEAR
AST SERPL-CCNC: 18 U/L (ref 12–45)
BASOPHILS # BLD AUTO: 0.3 % (ref 0–1.8)
BASOPHILS # BLD: 0.03 K/UL (ref 0–0.12)
BILIRUB SERPL-MCNC: 0.2 MG/DL (ref 0.1–1.5)
BILIRUB UR QL STRIP.AUTO: NEGATIVE
BUN SERPL-MCNC: 9 MG/DL (ref 8–22)
CALCIUM ALBUM COR SERPL-MCNC: 9.9 MG/DL (ref 8.5–10.5)
CALCIUM SERPL-MCNC: 10 MG/DL (ref 8.5–10.5)
CHLORIDE SERPL-SCNC: 107 MMOL/L (ref 96–112)
CO2 SERPL-SCNC: 19 MMOL/L (ref 20–33)
COLOR UR: YELLOW
CREAT SERPL-MCNC: 0.72 MG/DL (ref 0.5–1.4)
EOSINOPHIL # BLD AUTO: 0.08 K/UL (ref 0–0.51)
EOSINOPHIL NFR BLD: 0.9 % (ref 0–6.9)
ERYTHROCYTE [DISTWIDTH] IN BLOOD BY AUTOMATED COUNT: 41.2 FL (ref 35.9–50)
GFR SERPLBLD CREATININE-BSD FMLA CKD-EPI: 123 ML/MIN/1.73 M 2
GLOBULIN SER CALC-MCNC: 2.6 G/DL (ref 1.9–3.5)
GLUCOSE SERPL-MCNC: 143 MG/DL (ref 65–99)
GLUCOSE UR STRIP.AUTO-MCNC: NEGATIVE MG/DL
HCG SERPL QL: NEGATIVE
HCT VFR BLD AUTO: 43.7 % (ref 37–47)
HGB BLD-MCNC: 15.2 G/DL (ref 12–16)
IMM GRANULOCYTES # BLD AUTO: 0.03 K/UL (ref 0–0.11)
IMM GRANULOCYTES NFR BLD AUTO: 0.3 % (ref 0–0.9)
KETONES UR STRIP.AUTO-MCNC: ABNORMAL MG/DL
LEUKOCYTE ESTERASE UR QL STRIP.AUTO: NEGATIVE
LIPASE SERPL-CCNC: 38 U/L (ref 11–82)
LYMPHOCYTES # BLD AUTO: 3.83 K/UL (ref 1–4.8)
LYMPHOCYTES NFR BLD: 42.6 % (ref 22–41)
MCH RBC QN AUTO: 30.1 PG (ref 27–33)
MCHC RBC AUTO-ENTMCNC: 34.8 G/DL (ref 32.2–35.5)
MCV RBC AUTO: 86.5 FL (ref 81.4–97.8)
MICRO URNS: ABNORMAL
MONOCYTES # BLD AUTO: 0.71 K/UL (ref 0–0.85)
MONOCYTES NFR BLD AUTO: 7.9 % (ref 0–13.4)
NEUTROPHILS # BLD AUTO: 4.32 K/UL (ref 1.82–7.42)
NEUTROPHILS NFR BLD: 48 % (ref 44–72)
NITRITE UR QL STRIP.AUTO: NEGATIVE
NRBC # BLD AUTO: 0 K/UL
NRBC BLD-RTO: 0 /100 WBC (ref 0–0.2)
PH UR STRIP.AUTO: 5.5 [PH] (ref 5–8)
PLATELET # BLD AUTO: 290 K/UL (ref 164–446)
PMV BLD AUTO: 9.6 FL (ref 9–12.9)
POTASSIUM SERPL-SCNC: 3.9 MMOL/L (ref 3.6–5.5)
PROT SERPL-MCNC: 6.7 G/DL (ref 6–8.2)
PROT UR QL STRIP: NEGATIVE MG/DL
RBC # BLD AUTO: 5.05 M/UL (ref 4.2–5.4)
RBC UR QL AUTO: NEGATIVE
SODIUM SERPL-SCNC: 141 MMOL/L (ref 135–145)
SP GR UR STRIP.AUTO: 1.02
UROBILINOGEN UR STRIP.AUTO-MCNC: 0.2 MG/DL
WBC # BLD AUTO: 9 K/UL (ref 4.8–10.8)

## 2024-07-20 PROCEDURE — 83690 ASSAY OF LIPASE: CPT

## 2024-07-20 PROCEDURE — 96375 TX/PRO/DX INJ NEW DRUG ADDON: CPT

## 2024-07-20 PROCEDURE — 84703 CHORIONIC GONADOTROPIN ASSAY: CPT

## 2024-07-20 PROCEDURE — 36415 COLL VENOUS BLD VENIPUNCTURE: CPT

## 2024-07-20 PROCEDURE — 80053 COMPREHEN METABOLIC PANEL: CPT

## 2024-07-20 PROCEDURE — 96374 THER/PROPH/DIAG INJ IV PUSH: CPT

## 2024-07-20 PROCEDURE — 85025 COMPLETE CBC W/AUTO DIFF WBC: CPT

## 2024-07-20 PROCEDURE — 81003 URINALYSIS AUTO W/O SCOPE: CPT

## 2024-07-20 PROCEDURE — 99285 EMERGENCY DEPT VISIT HI MDM: CPT

## 2024-07-20 PROCEDURE — 700105 HCHG RX REV CODE 258: Mod: UD | Performed by: STUDENT IN AN ORGANIZED HEALTH CARE EDUCATION/TRAINING PROGRAM

## 2024-07-20 PROCEDURE — 700111 HCHG RX REV CODE 636 W/ 250 OVERRIDE (IP): Mod: JZ,UD | Performed by: STUDENT IN AN ORGANIZED HEALTH CARE EDUCATION/TRAINING PROGRAM

## 2024-07-20 RX ORDER — ONDANSETRON 2 MG/ML
4 INJECTION INTRAMUSCULAR; INTRAVENOUS ONCE
Status: COMPLETED | OUTPATIENT
Start: 2024-07-21 | End: 2024-07-20

## 2024-07-20 RX ORDER — SODIUM CHLORIDE 9 MG/ML
1000 INJECTION, SOLUTION INTRAVENOUS ONCE
Status: COMPLETED | OUTPATIENT
Start: 2024-07-21 | End: 2024-07-21

## 2024-07-20 RX ORDER — LOPERAMIDE HYDROCHLORIDE 2 MG/1
2 CAPSULE ORAL ONCE
Status: COMPLETED | OUTPATIENT
Start: 2024-07-21 | End: 2024-07-21

## 2024-07-20 RX ORDER — KETOROLAC TROMETHAMINE 15 MG/ML
15 INJECTION, SOLUTION INTRAMUSCULAR; INTRAVENOUS ONCE
Status: COMPLETED | OUTPATIENT
Start: 2024-07-21 | End: 2024-07-20

## 2024-07-20 RX ADMIN — KETOROLAC TROMETHAMINE 15 MG: 15 INJECTION, SOLUTION INTRAMUSCULAR; INTRAVENOUS at 23:53

## 2024-07-20 RX ADMIN — ONDANSETRON 4 MG: 2 INJECTION INTRAMUSCULAR; INTRAVENOUS at 23:54

## 2024-07-20 RX ADMIN — SODIUM CHLORIDE 1000 ML: 9 INJECTION, SOLUTION INTRAVENOUS at 23:56

## 2024-07-20 ASSESSMENT — FIBROSIS 4 INDEX: FIB4 SCORE: 0.19

## 2024-07-20 ASSESSMENT — PAIN DESCRIPTION - PAIN TYPE
TYPE: ACUTE PAIN
TYPE: ACUTE PAIN

## 2024-07-20 ASSESSMENT — PAIN DESCRIPTION - DESCRIPTORS: DESCRIPTORS: CRAMPING

## 2024-07-21 VITALS
BODY MASS INDEX: 40.23 KG/M2 | SYSTOLIC BLOOD PRESSURE: 131 MMHG | HEART RATE: 87 BPM | WEIGHT: 265.43 LBS | TEMPERATURE: 97.3 F | RESPIRATION RATE: 16 BRPM | DIASTOLIC BLOOD PRESSURE: 78 MMHG | OXYGEN SATURATION: 97 % | HEIGHT: 68 IN

## 2024-07-21 PROCEDURE — 700111 HCHG RX REV CODE 636 W/ 250 OVERRIDE (IP): Mod: JZ,UD | Performed by: STUDENT IN AN ORGANIZED HEALTH CARE EDUCATION/TRAINING PROGRAM

## 2024-07-21 PROCEDURE — A9270 NON-COVERED ITEM OR SERVICE: HCPCS | Mod: UD | Performed by: STUDENT IN AN ORGANIZED HEALTH CARE EDUCATION/TRAINING PROGRAM

## 2024-07-21 PROCEDURE — 700102 HCHG RX REV CODE 250 W/ 637 OVERRIDE(OP): Mod: UD | Performed by: STUDENT IN AN ORGANIZED HEALTH CARE EDUCATION/TRAINING PROGRAM

## 2024-07-21 RX ORDER — METOCLOPRAMIDE HYDROCHLORIDE 5 MG/ML
10 INJECTION INTRAMUSCULAR; INTRAVENOUS ONCE
Status: COMPLETED | OUTPATIENT
Start: 2024-07-21 | End: 2024-07-21

## 2024-07-21 RX ORDER — ONDANSETRON 4 MG/1
4 TABLET, ORALLY DISINTEGRATING ORAL EVERY 6 HOURS PRN
Qty: 10 TABLET | Refills: 0 | Status: SHIPPED | OUTPATIENT
Start: 2024-07-21

## 2024-07-21 RX ORDER — LOPERAMIDE HYDROCHLORIDE 2 MG/1
2 CAPSULE ORAL 4 TIMES DAILY PRN
Qty: 30 CAPSULE | Refills: 0 | Status: SHIPPED | OUTPATIENT
Start: 2024-07-21

## 2024-07-21 RX ADMIN — METOCLOPRAMIDE 10 MG: 5 INJECTION, SOLUTION INTRAMUSCULAR; INTRAVENOUS at 01:49

## 2024-07-21 RX ADMIN — LOPERAMIDE HYDROCHLORIDE 2 MG: 2 CAPSULE ORAL at 00:11

## 2024-07-28 ENCOUNTER — HOSPITAL ENCOUNTER (EMERGENCY)
Facility: MEDICAL CENTER | Age: 19
End: 2024-07-28
Attending: EMERGENCY MEDICINE
Payer: MEDICAID

## 2024-07-28 VITALS
OXYGEN SATURATION: 96 % | BODY MASS INDEX: 41.76 KG/M2 | DIASTOLIC BLOOD PRESSURE: 65 MMHG | WEIGHT: 266.1 LBS | HEIGHT: 67 IN | SYSTOLIC BLOOD PRESSURE: 127 MMHG | TEMPERATURE: 97 F | RESPIRATION RATE: 16 BRPM | HEART RATE: 73 BPM

## 2024-07-28 DIAGNOSIS — N93.8 DYSFUNCTIONAL UTERINE BLEEDING: Primary | ICD-10-CM

## 2024-07-28 LAB
ALBUMIN SERPL BCP-MCNC: 4.1 G/DL (ref 3.2–4.9)
ALBUMIN/GLOB SERPL: 1.4 G/DL
ALP SERPL-CCNC: 132 U/L (ref 30–99)
ALT SERPL-CCNC: 21 U/L (ref 2–50)
ANION GAP SERPL CALC-SCNC: 14 MMOL/L (ref 7–16)
AST SERPL-CCNC: 20 U/L (ref 12–45)
BASOPHILS # BLD AUTO: 0.3 % (ref 0–1.8)
BASOPHILS # BLD: 0.03 K/UL (ref 0–0.12)
BILIRUB SERPL-MCNC: 0.2 MG/DL (ref 0.1–1.5)
BUN SERPL-MCNC: 12 MG/DL (ref 8–22)
CALCIUM ALBUM COR SERPL-MCNC: 9.4 MG/DL (ref 8.5–10.5)
CALCIUM SERPL-MCNC: 9.5 MG/DL (ref 8.5–10.5)
CHLORIDE SERPL-SCNC: 107 MMOL/L (ref 96–112)
CO2 SERPL-SCNC: 19 MMOL/L (ref 20–33)
CREAT SERPL-MCNC: 0.6 MG/DL (ref 0.5–1.4)
EOSINOPHIL # BLD AUTO: 0.13 K/UL (ref 0–0.51)
EOSINOPHIL NFR BLD: 1.5 % (ref 0–6.9)
ERYTHROCYTE [DISTWIDTH] IN BLOOD BY AUTOMATED COUNT: 41.6 FL (ref 35.9–50)
GFR SERPLBLD CREATININE-BSD FMLA CKD-EPI: 132 ML/MIN/1.73 M 2
GLOBULIN SER CALC-MCNC: 2.9 G/DL (ref 1.9–3.5)
GLUCOSE SERPL-MCNC: 127 MG/DL (ref 65–99)
HCG SERPL QL: NEGATIVE
HCT VFR BLD AUTO: 45.3 % (ref 37–47)
HGB BLD-MCNC: 15.3 G/DL (ref 12–16)
IMM GRANULOCYTES # BLD AUTO: 0.02 K/UL (ref 0–0.11)
IMM GRANULOCYTES NFR BLD AUTO: 0.2 % (ref 0–0.9)
LIPASE SERPL-CCNC: 28 U/L (ref 11–82)
LYMPHOCYTES # BLD AUTO: 2.96 K/UL (ref 1–4.8)
LYMPHOCYTES NFR BLD: 33.2 % (ref 22–41)
MCH RBC QN AUTO: 29.8 PG (ref 27–33)
MCHC RBC AUTO-ENTMCNC: 33.8 G/DL (ref 32.2–35.5)
MCV RBC AUTO: 88.1 FL (ref 81.4–97.8)
MONOCYTES # BLD AUTO: 0.76 K/UL (ref 0–0.85)
MONOCYTES NFR BLD AUTO: 8.5 % (ref 0–13.4)
NEUTROPHILS # BLD AUTO: 5.01 K/UL (ref 1.82–7.42)
NEUTROPHILS NFR BLD: 56.3 % (ref 44–72)
NRBC # BLD AUTO: 0 K/UL
NRBC BLD-RTO: 0 /100 WBC (ref 0–0.2)
PLATELET # BLD AUTO: 320 K/UL (ref 164–446)
PMV BLD AUTO: 9.5 FL (ref 9–12.9)
POTASSIUM SERPL-SCNC: 4 MMOL/L (ref 3.6–5.5)
PROT SERPL-MCNC: 7 G/DL (ref 6–8.2)
RBC # BLD AUTO: 5.14 M/UL (ref 4.2–5.4)
SODIUM SERPL-SCNC: 140 MMOL/L (ref 135–145)
WBC # BLD AUTO: 8.9 K/UL (ref 4.8–10.8)

## 2024-07-28 PROCEDURE — 80053 COMPREHEN METABOLIC PANEL: CPT

## 2024-07-28 PROCEDURE — 99284 EMERGENCY DEPT VISIT MOD MDM: CPT

## 2024-07-28 PROCEDURE — 83690 ASSAY OF LIPASE: CPT

## 2024-07-28 PROCEDURE — 84703 CHORIONIC GONADOTROPIN ASSAY: CPT

## 2024-07-28 PROCEDURE — 36415 COLL VENOUS BLD VENIPUNCTURE: CPT

## 2024-07-28 PROCEDURE — 85025 COMPLETE CBC W/AUTO DIFF WBC: CPT

## 2024-07-28 RX ORDER — ACETAMINOPHEN 500 MG
1000 TABLET ORAL EVERY 6 HOURS PRN
Qty: 20 TABLET | Refills: 0 | Status: SHIPPED | OUTPATIENT
Start: 2024-07-28 | End: 2024-08-01

## 2024-07-28 RX ORDER — IBUPROFEN 800 MG/1
800 TABLET, FILM COATED ORAL EVERY 8 HOURS PRN
Qty: 9 TABLET | Refills: 0 | Status: SHIPPED | OUTPATIENT
Start: 2024-07-28 | End: 2024-07-31

## 2024-07-28 ASSESSMENT — FIBROSIS 4 INDEX: FIB4 SCORE: 0.24

## 2024-07-28 NOTE — ED NOTES
Chief Complaint   Patient presents with    Vaginal Bleeding     Heavy bleeding , pt has an IUD x1wk     Pt ambulated to triage c/o heavy vaginal bleeding x1wk. Pt said that she has an IUD and hasn't had period .

## 2024-07-29 NOTE — DISCHARGE INSTRUCTIONS
Thankfully your blood counts are all normal today.  Follow-up with your OB/GYN and primary care team for further evaluation and treatment.  Given your symptoms or concerns, please return to the ED.  You for coming in today.

## 2024-07-29 NOTE — ED PROVIDER NOTES
ED Provider Note    Scribed for Jimmy Hill by Hannah Carcamo. 7/28/2024  5:53 PM    Primary care provider: Indu Prince M.D.  Means of arrival: Private vehicle  History obtained from: Patient  History limited by: None    CHIEF COMPLAINT  Chief Complaint   Patient presents with    Vaginal Bleeding     Heavy bleeding , pt has an IUD x1wk       EXTERNAL RECORDS REVIEWED  Other The patient was seen here on 7/20/2024 for nausea, vomiting, diarrhea and abdominal pain.     HPI/ROS  LIMITATION TO HISTORY   Select: : None  OUTSIDE HISTORIAN(S):  None.    HPI  Ani Archuleta is a 18 y.o. female who presents to the Emergency Department for evaluation of heavy bleeding onset 1 week ago. She describes that she has had an IUD in for about 5 months now and reports that she has not had her period since having the IUD placed. The patient states she also has abdominal cramping, but denies any dysuria, nausea, or vomiting. The patient mentions that she has had a prior episode of bleeding for 3 months and notes that she required a blood transfusion at that time. Prior to having the IUD, the patient states that she previously received the Depo shot.    REVIEW OF SYSTEMS  As above, all other systems reviewed and are negative.   See HPI for further details.     PAST MEDICAL HISTORY   has a past medical history of Morbid obesity with BMI of 40.0-44.9, adult (HCC) (08/11/2021) and Situs inversus.    SURGICAL HISTORY   has a past surgical history that includes incision and drainage general (2/6/2024).    SOCIAL HISTORY  Social History     Tobacco Use    Smoking status: Never    Smokeless tobacco: Never   Vaping Use    Vaping status: Never Used   Substance Use Topics    Alcohol use: Not Currently     Comment: Admits to occasional drinking    Drug use: Never      Social History     Substance and Sexual Activity   Drug Use Never     FAMILY HISTORY  Family History   Adopted: Yes   Family history unknown: Yes  "    CURRENT MEDICATIONS  Home Medications       Reviewed by Oneida Collado R.N. (Registered Nurse) on 07/28/24 at 1641  Med List Status: Not Addressed     Medication Last Dose Status   acetaminophen (TYLENOL) 500 MG Tab  Active   celecoxib (CELEBREX) 200 MG Cap  Active   levonorgestrel (MIRENA, 52 MG,) 20 MCG/DAY IUD  Active   loperamide (IMODIUM) 2 MG Cap  Active   ondansetron (ZOFRAN ODT) 4 MG TABLET DISPERSIBLE  Active                  ALLERGIES  No Known Allergies    PHYSICAL EXAM    VITAL SIGNS:   Vitals:    07/28/24 1629 07/28/24 1639 07/28/24 1800   BP: 130/88  127/65   Pulse: (!) 102  73   Resp: 16  16   Temp: 36 °C (96.8 °F)  36.1 °C (97 °F)   TempSrc: Temporal  Temporal   SpO2: 97%  96%   Weight:  121 kg (266 lb 1.5 oz)    Height:  1.702 m (5' 7\")        Vitals: My interpretation: normotensive, not tachycardic, afebrile, not hypoxic    Reinterpretation of vitals: Improved unremarkable at time of discharge    PE:   Gen: sitting comfortably, speaking clearly, appears in no acute distress   ENT: Mucous membranes moist, posterior pharynx clear, uvula midline, nares patent bilaterally   Neck: Supple, FROM  Pulmonary: Lungs are clear to auscultation bilaterally. No tachypnea  CV:  RRR, no murmur appreciated, pulses 2+ in both upper and lower extremities  Abdomen: soft, ND; no rebound/guarding  : no CVA or suprapubic tenderness   Neuro: A&Ox4 (person, place, time, situation), speech fluent, gait steady, no focal deficits appreciated  Skin: No rash or lesions.  No pallor or jaundice.  No cyanosis.  Warm and dry.     DIAGNOSTIC STUDIES / PROCEDURES    LABS  Results for orders placed or performed during the hospital encounter of 07/28/24   CBC WITH DIFFERENTIAL   Result Value Ref Range    WBC 8.9 4.8 - 10.8 K/uL    RBC 5.14 4.20 - 5.40 M/uL    Hemoglobin 15.3 12.0 - 16.0 g/dL    Hematocrit 45.3 37.0 - 47.0 %    MCV 88.1 81.4 - 97.8 fL    MCH 29.8 27.0 - 33.0 pg    MCHC 33.8 32.2 - 35.5 g/dL    RDW 41.6 35.9 - " 50.0 fL    Platelet Count 320 164 - 446 K/uL    MPV 9.5 9.0 - 12.9 fL    Neutrophils-Polys 56.30 44.00 - 72.00 %    Lymphocytes 33.20 22.00 - 41.00 %    Monocytes 8.50 0.00 - 13.40 %    Eosinophils 1.50 0.00 - 6.90 %    Basophils 0.30 0.00 - 1.80 %    Immature Granulocytes 0.20 0.00 - 0.90 %    Nucleated RBC 0.00 0.00 - 0.20 /100 WBC    Neutrophils (Absolute) 5.01 1.82 - 7.42 K/uL    Lymphs (Absolute) 2.96 1.00 - 4.80 K/uL    Monos (Absolute) 0.76 0.00 - 0.85 K/uL    Eos (Absolute) 0.13 0.00 - 0.51 K/uL    Baso (Absolute) 0.03 0.00 - 0.12 K/uL    Immature Granulocytes (abs) 0.02 0.00 - 0.11 K/uL    NRBC (Absolute) 0.00 K/uL   COMP METABOLIC PANEL   Result Value Ref Range    Sodium 140 135 - 145 mmol/L    Potassium 4.0 3.6 - 5.5 mmol/L    Chloride 107 96 - 112 mmol/L    Co2 19 (L) 20 - 33 mmol/L    Anion Gap 14.0 7.0 - 16.0    Glucose 127 (H) 65 - 99 mg/dL    Bun 12 8 - 22 mg/dL    Creatinine 0.60 0.50 - 1.40 mg/dL    Calcium 9.5 8.5 - 10.5 mg/dL    Correct Calcium 9.4 8.5 - 10.5 mg/dL    AST(SGOT) 20 12 - 45 U/L    ALT(SGPT) 21 2 - 50 U/L    Alkaline Phosphatase 132 (H) 30 - 99 U/L    Total Bilirubin 0.2 0.1 - 1.5 mg/dL    Albumin 4.1 3.2 - 4.9 g/dL    Total Protein 7.0 6.0 - 8.2 g/dL    Globulin 2.9 1.9 - 3.5 g/dL    A-G Ratio 1.4 g/dL   LIPASE   Result Value Ref Range    Lipase 28 11 - 82 U/L   HCG QUAL SERUM   Result Value Ref Range    Beta-Hcg Qualitative Serum Negative Negative   ESTIMATED GFR   Result Value Ref Range    GFR (CKD-EPI) 132 >60 mL/min/1.73 m 2      All labs reviewed by me. Labs were compared to prior labs if they were available. Significant for no leukocytosis, no anemia, normal electrolytes, normal renal function, normal liver enzymes, normal bilirubin, lipase normal, patient is negative.    COURSE & MEDICAL DECISION MAKING  Nursing notes, VS, PMSFHx, labs reviewed in chart.    ED Observation Status? No; Patient does not meet criteria for ED Observation.     Ddx: Dysfunctional uterine bleeding,  IUD misplacement, pregnancy    MDM: 5:53 PM Ani Archuleta is a 18 y.o. female who presented with evaluation of 1 week of heavy menstrual bleeding, 3-4 pads soaked today.  Patient has a history of this happening previously.  She has an IUD in for the last 5 months.  She is associated mild abdominal cramping.  Does not think is pregnant.  She has not had a menstrual cycle in 3 months.  She states she has had have a blood transfusion before due to severe vaginal bleeding she wanted come in to have her blood counts checked.  She does not feel tired weak or dizzy.  Her vital signs unremarkable upon arrival here.  Physical exam is benign she has no reproducible tenderness in the abdomen. All labs reviewed by me. Labs were compared to prior labs if they were available. Significant for no leukocytosis, no anemia, normal electrolytes, normal renal function, normal liver enzymes, normal bilirubin, lipase normal, patient is negative.  Patient feels very relieved negative workup here.  She will follow back up with her OB/GYN for further evaluation and treatment.  She has return precautions in place and verbalized understanding.    ADDITIONAL PROBLEM LIST AND DISPOSITION    I have discussed management of the patient with the following physicians and ANDREEA's:  None.    Discussion of management with other QHP or appropriate source(s): None     Barriers to care at this time, including but not limited to:  None known .     Decision tools and prescription drugs considered including, but not limited to: Pain Medications Tylenol Motrin for cramping .    FINAL IMPRESSION  1. Dysfunctional uterine bleeding Acute      IHannah (Adams), am scribing for, and in the presence of, Jimmy Hill.    Electronically signed by: Hannah Carcamo (Adams), 7/28/2024    IJimmy personally performed the services described in this documentation, as scribed by Hannah Carcamo in  my presence, and it is both accurate and complete.    The note accurately reflects work and decisions made by me.  Jimmy Hill  7/28/2024  6:13 PM

## 2024-07-29 NOTE — ED NOTES
Patient discharged home per ERP.  Discharge teaching and education discussed with patient. POC discussed.   Patient verbalized understanding of discharge teaching and education. No other questions at this time.     RX for ibuprofen & motrin sent to pt's pharmacy.

## 2024-08-22 ENCOUNTER — OFFICE VISIT (OUTPATIENT)
Dept: URGENT CARE | Facility: PHYSICIAN GROUP | Age: 19
End: 2024-08-22
Payer: MEDICAID

## 2024-08-22 ENCOUNTER — HOSPITAL ENCOUNTER (OUTPATIENT)
Facility: MEDICAL CENTER | Age: 19
End: 2024-08-22
Payer: MEDICAID

## 2024-08-22 VITALS
WEIGHT: 266 LBS | OXYGEN SATURATION: 96 % | BODY MASS INDEX: 41.75 KG/M2 | RESPIRATION RATE: 16 BRPM | TEMPERATURE: 97.9 F | SYSTOLIC BLOOD PRESSURE: 130 MMHG | DIASTOLIC BLOOD PRESSURE: 76 MMHG | HEART RATE: 102 BPM | HEIGHT: 67 IN

## 2024-08-22 DIAGNOSIS — R10.2 VAGINAL PAIN: ICD-10-CM

## 2024-08-22 DIAGNOSIS — Z11.3 SCREENING EXAMINATION FOR STD (SEXUALLY TRANSMITTED DISEASE): ICD-10-CM

## 2024-08-22 DIAGNOSIS — N89.8 VAGINAL ITCHING: ICD-10-CM

## 2024-08-22 LAB
APPEARANCE UR: NORMAL
BILIRUB UR STRIP-MCNC: NORMAL MG/DL
CANDIDA DNA VAG QL PROBE+SIG AMP: NEGATIVE
COLOR UR AUTO: NORMAL
G VAGINALIS DNA VAG QL PROBE+SIG AMP: NEGATIVE
GLUCOSE UR STRIP.AUTO-MCNC: NORMAL MG/DL
KETONES UR STRIP.AUTO-MCNC: NORMAL MG/DL
LEUKOCYTE ESTERASE UR QL STRIP.AUTO: NORMAL
NITRITE UR QL STRIP.AUTO: NORMAL
PH UR STRIP.AUTO: 6 [PH] (ref 5–8)
POCT INT CON NEG: NEGATIVE
POCT INT CON POS: POSITIVE
POCT URINE PREGNANCY TEST: NEGATIVE
PROT UR QL STRIP: 100 MG/DL
RBC UR QL AUTO: NORMAL
SP GR UR STRIP.AUTO: >=1.03
T VAGINALIS DNA VAG QL PROBE+SIG AMP: NEGATIVE
UROBILINOGEN UR STRIP-MCNC: 0.2 MG/DL

## 2024-08-22 PROCEDURE — 87529 HSV DNA AMP PROBE: CPT

## 2024-08-22 PROCEDURE — 87510 GARDNER VAG DNA DIR PROBE: CPT

## 2024-08-22 PROCEDURE — 87086 URINE CULTURE/COLONY COUNT: CPT

## 2024-08-22 PROCEDURE — 87480 CANDIDA DNA DIR PROBE: CPT

## 2024-08-22 PROCEDURE — 87660 TRICHOMONAS VAGIN DIR PROBE: CPT

## 2024-08-22 PROCEDURE — 87491 CHLMYD TRACH DNA AMP PROBE: CPT

## 2024-08-22 PROCEDURE — 87591 N.GONORRHOEAE DNA AMP PROB: CPT

## 2024-08-22 PROCEDURE — 3078F DIAST BP <80 MM HG: CPT

## 2024-08-22 PROCEDURE — 3075F SYST BP GE 130 - 139MM HG: CPT

## 2024-08-22 PROCEDURE — 87077 CULTURE AEROBIC IDENTIFY: CPT

## 2024-08-22 PROCEDURE — 87798 DETECT AGENT NOS DNA AMP: CPT

## 2024-08-22 PROCEDURE — 81025 URINE PREGNANCY TEST: CPT

## 2024-08-22 PROCEDURE — 81002 URINALYSIS NONAUTO W/O SCOPE: CPT

## 2024-08-22 PROCEDURE — 99214 OFFICE O/P EST MOD 30 MIN: CPT | Mod: 25

## 2024-08-22 RX ORDER — LIDOCAINE HYDROCHLORIDE 20 MG/ML
SOLUTION OROPHARYNGEAL
Qty: 20 ML | Refills: 0 | Status: SHIPPED | OUTPATIENT
Start: 2024-08-22

## 2024-08-22 RX ORDER — FLUCONAZOLE 150 MG/1
150 TABLET ORAL DAILY
Qty: 1 TABLET | Refills: 1 | Status: SHIPPED | OUTPATIENT
Start: 2024-08-22

## 2024-08-22 RX ORDER — NYSTATIN 100000 U/G
1 CREAM TOPICAL 2 TIMES DAILY
Qty: 20 APPLICATION | Refills: 0 | Status: SHIPPED | OUTPATIENT
Start: 2024-08-22 | End: 2024-09-01

## 2024-08-22 ASSESSMENT — FIBROSIS 4 INDEX: FIB4 SCORE: 0.26

## 2024-08-22 NOTE — PROGRESS NOTES
Verbal consent was acquired by the patient to use [employer] ambient listening note generation during this visit   Subjective:   Ani Archuleta is a 18 y.o. female who presents for Vaginal Pain (X 2 days with rash.  )      HPI:  History of Present Illness  The patient is a 18-year-old female who presents today for evaluation of vaginal pain, vaginal itching, and vaginal rash.    She reports the presence of sores in her genital area, which have been causing discomfort for approximately a week. She experiences severe itching and finds even gentle wiping of the area to be extremely painful. The pain is described as localized to the skin, rather than being internal or pelvic. She has been managing the pain with Tylenol.    She also mentions frequent urination, occurring every few minutes, but only in small amounts. She experiences pain during urination.  She reports also having some vaginal discharge.    She had a chlamydia infection two weeks ago, which was treated with a 7-day course of antibiotics. She is unsure if the infection has completely resolved. Her partner was also treated for chlamydia, but she suspects that his infection may not have fully cleared. She reports no history of genital herpes.        Review of Systems   Skin:  Positive for itching and rash.       Medications:    Current Outpatient Medications on File Prior to Visit   Medication Sig Dispense Refill    ondansetron (ZOFRAN ODT) 4 MG TABLET DISPERSIBLE Take 1 Tablet by mouth every 6 hours as needed for Nausea/Vomiting for up to 12 doses. (Patient not taking: Reported on 8/22/2024) 10 Tablet 0    loperamide (IMODIUM) 2 MG Cap Take 1 Capsule by mouth 4 times a day as needed for Diarrhea. (Patient not taking: Reported on 8/22/2024) 30 Capsule 0    celecoxib (CELEBREX) 200 MG Cap Take 1 Capsule by mouth every 12 hours as needed for Mild Pain or Inflammation. (Patient not taking: Reported on 6/17/2024) 14 Capsule 0    levonorgestrel (MIRENA, 52  "MG,) 20 MCG/DAY IUD 1 Each by Intrauterine route one time. (Patient not taking: Reported on 8/22/2024)       No current facility-administered medications on file prior to visit.        Allergies:   Patient has no known allergies.    Problem List:   Patient Active Problem List   Diagnosis    Anemia    Dysmenorrhea    Morbid obesity with BMI of 40.0-44.9, adult (HCC)    Menorrhagia with irregular cycle    PCOS (polycystic ovarian syndrome)    Situs inversus totalis    Sacrococcygeal pilonidal cyst with abscess    No contraindication to deep vein thrombosis (DVT) prophylaxis        Surgical History:  Past Surgical History:   Procedure Laterality Date    INCISION AND DRAINAGE GENERAL  2/6/2024    Procedure: INCISION AND DRAINAGE OF PILONIDAL ABSCESS;  Surgeon: Jeremy Gomez M.D.;  Location: SURGERY [facility];  Service: General       Past Social Hx:   Social History     Tobacco Use    Smoking status: Never    Smokeless tobacco: Never   Vaping Use    Vaping status: Never Used   Substance Use Topics    Alcohol use: Not Currently     Comment: Admits to occasional drinking    Drug use: Never          Problem list, medications, and allergies reviewed by myself today in Epic.     Objective:     /76   Pulse (!) 102   Temp 36.6 °C (97.9 °F) (Temporal)   Resp 16   Ht 1.702 m (5' 7\")   Wt 121 kg (266 lb)   LMP 08/05/2024   SpO2 96%   BMI 41.66 kg/m²     Physical Exam  Vitals and nursing note reviewed.   Constitutional:       General: She is not in acute distress.     Appearance: Normal appearance. She is obese. She is not ill-appearing, toxic-appearing or diaphoretic.   HENT:      Head: Normocephalic and atraumatic.   Cardiovascular:      Rate and Rhythm: Normal rate and regular rhythm.      Pulses: Normal pulses.      Heart sounds: Normal heart sounds. No murmur heard.     No friction rub. No gallop.   Pulmonary:      Effort: Pulmonary effort is normal. No respiratory distress.      Breath sounds: Normal " breath sounds. No stridor. No wheezing, rhonchi or rales.   Chest:      Chest wall: No tenderness.   Genitourinary:     Comments: + Labial swelling, + white vaginal discharge, + vaginal excoriation and fissuring.  No vesicular rash identified.   Skin:     General: Skin is warm and dry.      Capillary Refill: Capillary refill takes less than 2 seconds.   Neurological:      General: No focal deficit present.      Mental Status: She is alert and oriented to person, place, and time. Mental status is at baseline.      Gait: Gait normal.   Psychiatric:         Mood and Affect: Mood normal. Affect is tearful.         Thought Content: Thought content normal.         Judgment: Judgment normal.      Comments: The patient is very tearful during exam and reports excruciating pain to vaginal tissue         Assessment/Plan:     Diagnosis and associated orders:   1. Screening examination for STD (sexually transmitted disease)  POCT Urinalysis    POCT PREGNANCY    VAGINAL PATHOGENS DNA PANEL    Chlamydia/GC, PCR (Genital/Anal swab)    URINE CULTURE(NEW)      2. Vaginal itching  fluconazole (DIFLUCAN) 150 MG tablet    URINE CULTURE(NEW)    nystatin (MYCOSTATIN) 281724 UNIT/GM Cream topical cream      3. Vaginal pain  URINE CULTURE(NEW)    lidocaine (XYLOCAINE) 2 % Solution    HERPES VIRAL CULTURE              Comments/MDM:   Pt is clinically stable at today's acute urgent care visit.  No acute distress noted. Appropriate for outpatient management at this time.     Assessment & Plan  STD screening/vaginal pain  This is a 18-year-old female patient who presents today for vaginal itching, vaginal discomfort, and vaginal discharge.  She was recently treated for chlamydia 2 weeks ago.  She reports completion of antibiotics.  She reports persistent vaginal discomfort.  On exam, patient noted to have excoriation, labial swelling, and vaginal discharge.  No sign of vaginal rash.  The area was swabbed to rule out genital herpes.   Additionally vaginal swabs obtained for Vach path and repeat GC chlamydia testing.  Physical exam findings and symptoms are consistent with a vaginal yeast infection.  There is high probability of this secondary to recent antibiotic use.  She will be treated with Diflucan for this.  The patient is very tearful during exam secondary to pain.  I have recommended Tylenol, ibuprofen, warm sitz bath's, and have provided prescription for viscous lidocaine to be applied to area.  Advised the patient to refrain from any unprotected sexual intercourse until receiving results and or treatment.  The patient will be notified via [employer] of her results.  The patient reports vaginal sores, pain, and discharge for almost a week. Examination revealed excoriated skin but no lesions suggestive of genital herpes. A swab test was conducted to rule out herpes, with results pending. She was advised to take Tylenol or ibuprofen for pain management and to perform warm sitz baths for comfort. A prescription for lidocaine was provided to numb the affected area.    Vaginal itching  The patient recently completed antibiotics for chlamydia, which could increase the risk of a yeast infection. She reports severe itching and pain in the vaginal area.  Diflucan 150 mg p.o. x 1 was prescribed along with topical nystatin.                  Discussed DDx, management options (risks,benefits, and alternatives to planned treatment), natural progression and supportive care.  Expressed understanding and the treatment plan was agreed upon. Questions were encouraged and answered   Return to urgent care prn if new or worsening sx or if there is no improvement in condition prn.    Educated in Red flags and indications to immediately call 911 or present to the Emergency Department.   Advised the patient to follow-up with the primary care physician for recheck, reevaluation, and consideration of further management.    I personally reviewed prior external notes  and test results pertinent to today's visit.  I have independently reviewed and interpreted all diagnostics ordered during this urgent care acute visit.       Please note that this dictation was created using voice recognition software. I have made a reasonable attempt to correct obvious errors, but I expect that there are errors of grammar and possibly content that I did not discover before finalizing the note.    This note was electronically signed by GEOFF Gruber

## 2024-08-23 LAB
C TRACH DNA GENITAL QL NAA+PROBE: NEGATIVE
N GONORRHOEA DNA GENITAL QL NAA+PROBE: NEGATIVE
SPECIMEN SOURCE: NORMAL

## 2024-08-24 DIAGNOSIS — N30.01 ACUTE CYSTITIS WITH HEMATURIA: ICD-10-CM

## 2024-08-24 LAB
BACTERIA UR CULT: ABNORMAL
BACTERIA UR CULT: ABNORMAL
SIGNIFICANT IND 70042: ABNORMAL
SITE SITE: ABNORMAL
SOURCE SOURCE: ABNORMAL

## 2024-08-24 RX ORDER — AMOXICILLIN 500 MG/1
500 CAPSULE ORAL 3 TIMES DAILY
Qty: 30 CAPSULE | Refills: 0 | Status: SHIPPED | OUTPATIENT
Start: 2024-08-24 | End: 2024-09-03

## 2024-08-25 LAB
HSV1 DNA CSF QL NAA+PROBE: NOT DETECTED
HSV2 DNA CSF QL NAA+PROBE: DETECTED
SPECIMEN SOURCE: ABNORMAL
SPECIMEN SOURCE: NORMAL
VZV DNA SPEC QL NAA+PROBE: NOT DETECTED

## 2024-10-14 ENCOUNTER — HOSPITAL ENCOUNTER (EMERGENCY)
Facility: MEDICAL CENTER | Age: 19
End: 2024-10-14
Attending: STUDENT IN AN ORGANIZED HEALTH CARE EDUCATION/TRAINING PROGRAM
Payer: MEDICAID

## 2024-10-14 ENCOUNTER — APPOINTMENT (OUTPATIENT)
Dept: RADIOLOGY | Facility: MEDICAL CENTER | Age: 19
End: 2024-10-14
Attending: STUDENT IN AN ORGANIZED HEALTH CARE EDUCATION/TRAINING PROGRAM
Payer: MEDICAID

## 2024-10-14 ENCOUNTER — PHARMACY VISIT (OUTPATIENT)
Dept: PHARMACY | Facility: MEDICAL CENTER | Age: 19
End: 2024-10-14
Payer: COMMERCIAL

## 2024-10-14 VITALS
TEMPERATURE: 97 F | HEIGHT: 67 IN | DIASTOLIC BLOOD PRESSURE: 79 MMHG | RESPIRATION RATE: 16 BRPM | SYSTOLIC BLOOD PRESSURE: 135 MMHG | BODY MASS INDEX: 41.59 KG/M2 | HEART RATE: 98 BPM | WEIGHT: 264.99 LBS | OXYGEN SATURATION: 99 %
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
## 2024-10-14 DIAGNOSIS — S61.259A DOG BITE OF FINGER, INITIAL ENCOUNTER: ICD-10-CM

## 2024-10-14 PROCEDURE — 304217 HCHG IRRIGATION SYSTEM

## 2024-10-14 PROCEDURE — 90715 TDAP VACCINE 7 YRS/> IM: CPT | Performed by: STUDENT IN AN ORGANIZED HEALTH CARE EDUCATION/TRAINING PROGRAM

## 2024-10-14 PROCEDURE — RXMED WILLOW AMBULATORY MEDICATION CHARGE: Performed by: STUDENT IN AN ORGANIZED HEALTH CARE EDUCATION/TRAINING PROGRAM

## 2024-10-14 PROCEDURE — A9270 NON-COVERED ITEM OR SERVICE: HCPCS | Mod: UD

## 2024-10-14 PROCEDURE — 90471 IMMUNIZATION ADMIN: CPT

## 2024-10-14 PROCEDURE — 700111 HCHG RX REV CODE 636 W/ 250 OVERRIDE (IP): Performed by: STUDENT IN AN ORGANIZED HEALTH CARE EDUCATION/TRAINING PROGRAM

## 2024-10-14 PROCEDURE — 700102 HCHG RX REV CODE 250 W/ 637 OVERRIDE(OP): Mod: UD

## 2024-10-14 PROCEDURE — 700102 HCHG RX REV CODE 250 W/ 637 OVERRIDE(OP): Mod: UD | Performed by: STUDENT IN AN ORGANIZED HEALTH CARE EDUCATION/TRAINING PROGRAM

## 2024-10-14 PROCEDURE — 99284 EMERGENCY DEPT VISIT MOD MDM: CPT

## 2024-10-14 PROCEDURE — 73140 X-RAY EXAM OF FINGER(S): CPT | Mod: RT

## 2024-10-14 PROCEDURE — A9270 NON-COVERED ITEM OR SERVICE: HCPCS | Mod: UD | Performed by: STUDENT IN AN ORGANIZED HEALTH CARE EDUCATION/TRAINING PROGRAM

## 2024-10-14 RX ORDER — OXYCODONE HYDROCHLORIDE 10 MG/1
10 TABLET ORAL ONCE
Status: COMPLETED | OUTPATIENT
Start: 2024-10-14 | End: 2024-10-14

## 2024-10-14 RX ORDER — ACETAMINOPHEN 325 MG/1
650 TABLET ORAL ONCE
Status: COMPLETED | OUTPATIENT
Start: 2024-10-14 | End: 2024-10-14

## 2024-10-14 RX ADMIN — OXYCODONE HYDROCHLORIDE 10 MG: 10 TABLET ORAL at 21:45

## 2024-10-14 RX ADMIN — AMOXICILLIN AND CLAVULANATE POTASSIUM 1 TABLET: 875; 125 TABLET, FILM COATED ORAL at 21:31

## 2024-10-14 RX ADMIN — ACETAMINOPHEN 650 MG: 325 TABLET ORAL at 21:01

## 2024-10-14 RX ADMIN — CLOSTRIDIUM TETANI TOXOID ANTIGEN (FORMALDEHYDE INACTIVATED), CORYNEBACTERIUM DIPHTHERIAE TOXOID ANTIGEN (FORMALDEHYDE INACTIVATED), BORDETELLA PERTUSSIS TOXOID ANTIGEN (GLUTARALDEHYDE INACTIVATED), BORDETELLA PERTUSSIS FILAMENTOUS HEMAGGLUTININ ANTIGEN (FORMALDEHYDE INACTIVATED), BORDETELLA PERTUSSIS PERTACTIN ANTIGEN, AND BORDETELLA PERTUSSIS FIMBRIAE 2/3 ANTIGEN 0.5 ML: 5; 2; 2.5; 5; 3; 5 INJECTION, SUSPENSION INTRAMUSCULAR at 23:22

## 2024-10-14 ASSESSMENT — PAIN DESCRIPTION - PAIN TYPE: TYPE: ACUTE PAIN

## 2024-10-14 ASSESSMENT — FIBROSIS 4 INDEX: FIB4 SCORE: 0.3

## 2024-10-18 ENCOUNTER — ANESTHESIA EVENT (OUTPATIENT)
Dept: SURGERY | Facility: MEDICAL CENTER | Age: 19
DRG: 580 | End: 2024-10-18
Payer: MEDICAID

## 2024-10-18 ENCOUNTER — ANESTHESIA (OUTPATIENT)
Dept: SURGERY | Facility: MEDICAL CENTER | Age: 19
DRG: 580 | End: 2024-10-18
Payer: MEDICAID

## 2024-10-18 ENCOUNTER — HOSPITAL ENCOUNTER (INPATIENT)
Facility: MEDICAL CENTER | Age: 19
LOS: 2 days | DRG: 580 | End: 2024-10-20
Attending: EMERGENCY MEDICINE | Admitting: STUDENT IN AN ORGANIZED HEALTH CARE EDUCATION/TRAINING PROGRAM
Payer: MEDICAID

## 2024-10-18 DIAGNOSIS — M65.949 FLEXOR TENOSYNOVITIS OF FINGER: Primary | ICD-10-CM
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
## 2024-10-18 LAB
ANION GAP SERPL CALC-SCNC: 17 MMOL/L (ref 7–16)
BASOPHILS # BLD AUTO: 0.3 % (ref 0–1.8)
BASOPHILS # BLD: 0.03 K/UL (ref 0–0.12)
BUN SERPL-MCNC: 6 MG/DL (ref 8–22)
CALCIUM SERPL-MCNC: 9.9 MG/DL (ref 8.5–10.5)
CHLORIDE SERPL-SCNC: 103 MMOL/L (ref 96–112)
CO2 SERPL-SCNC: 20 MMOL/L (ref 20–33)
CREAT SERPL-MCNC: 0.5 MG/DL (ref 0.5–1.4)
EOSINOPHIL # BLD AUTO: 0.18 K/UL (ref 0–0.51)
EOSINOPHIL NFR BLD: 1.9 % (ref 0–6.9)
ERYTHROCYTE [DISTWIDTH] IN BLOOD BY AUTOMATED COUNT: 38.7 FL (ref 35.9–50)
GFR SERPLBLD CREATININE-BSD FMLA CKD-EPI: 138 ML/MIN/1.73 M 2
GLUCOSE SERPL-MCNC: 86 MG/DL (ref 65–99)
HCG SERPL QL: NEGATIVE
HCT VFR BLD AUTO: 44.5 % (ref 37–47)
HGB BLD-MCNC: 14.7 G/DL (ref 12–16)
HOLDING TUBE BB 8507: NORMAL
IMM GRANULOCYTES # BLD AUTO: 0.04 K/UL (ref 0–0.11)
IMM GRANULOCYTES NFR BLD AUTO: 0.4 % (ref 0–0.9)
LYMPHOCYTES # BLD AUTO: 3.18 K/UL (ref 1–4.8)
LYMPHOCYTES NFR BLD: 33.3 % (ref 22–41)
MCH RBC QN AUTO: 28.7 PG (ref 27–33)
MCHC RBC AUTO-ENTMCNC: 33 G/DL (ref 32.2–35.5)
MCV RBC AUTO: 86.7 FL (ref 81.4–97.8)
MONOCYTES # BLD AUTO: 0.54 K/UL (ref 0–0.85)
MONOCYTES NFR BLD AUTO: 5.7 % (ref 0–13.4)
NEUTROPHILS # BLD AUTO: 5.58 K/UL (ref 1.82–7.42)
NEUTROPHILS NFR BLD: 58.4 % (ref 44–72)
NRBC # BLD AUTO: 0 K/UL
NRBC BLD-RTO: 0 /100 WBC (ref 0–0.2)
PLATELET # BLD AUTO: 345 K/UL (ref 164–446)
PMV BLD AUTO: 9.5 FL (ref 9–12.9)
POTASSIUM SERPL-SCNC: 3.9 MMOL/L (ref 3.6–5.5)
RBC # BLD AUTO: 5.13 M/UL (ref 4.2–5.4)
SODIUM SERPL-SCNC: 140 MMOL/L (ref 135–145)
WBC # BLD AUTO: 9.6 K/UL (ref 4.8–10.8)

## 2024-10-18 PROCEDURE — 87102 FUNGUS ISOLATION CULTURE: CPT | Mod: 91

## 2024-10-18 PROCEDURE — 700105 HCHG RX REV CODE 258: Mod: UD | Performed by: EMERGENCY MEDICINE

## 2024-10-18 PROCEDURE — 160048 HCHG OR STATISTICAL LEVEL 1-5: Performed by: STUDENT IN AN ORGANIZED HEALTH CARE EDUCATION/TRAINING PROGRAM

## 2024-10-18 PROCEDURE — 700111 HCHG RX REV CODE 636 W/ 250 OVERRIDE (IP): Mod: JZ,UD | Performed by: EMERGENCY MEDICINE

## 2024-10-18 PROCEDURE — 64721 CARPAL TUNNEL SURGERY: CPT | Mod: RT | Performed by: STUDENT IN AN ORGANIZED HEALTH CARE EDUCATION/TRAINING PROGRAM

## 2024-10-18 PROCEDURE — 26020 DRAIN HAND TENDON SHEATH: CPT | Mod: F7 | Performed by: STUDENT IN AN ORGANIZED HEALTH CARE EDUCATION/TRAINING PROGRAM

## 2024-10-18 PROCEDURE — 12002 RPR S/N/AX/GEN/TRNK2.6-7.5CM: CPT | Mod: 59 | Performed by: STUDENT IN AN ORGANIZED HEALTH CARE EDUCATION/TRAINING PROGRAM

## 2024-10-18 PROCEDURE — 36415 COLL VENOUS BLD VENIPUNCTURE: CPT

## 2024-10-18 PROCEDURE — 700111 HCHG RX REV CODE 636 W/ 250 OVERRIDE (IP): Mod: JZ | Performed by: STUDENT IN AN ORGANIZED HEALTH CARE EDUCATION/TRAINING PROGRAM

## 2024-10-18 PROCEDURE — 99285 EMERGENCY DEPT VISIT HI MDM: CPT

## 2024-10-18 PROCEDURE — 87070 CULTURE OTHR SPECIMN AEROBIC: CPT | Mod: 91

## 2024-10-18 PROCEDURE — A9270 NON-COVERED ITEM OR SERVICE: HCPCS | Performed by: STUDENT IN AN ORGANIZED HEALTH CARE EDUCATION/TRAINING PROGRAM

## 2024-10-18 PROCEDURE — 96365 THER/PROPH/DIAG IV INF INIT: CPT

## 2024-10-18 PROCEDURE — 96367 TX/PROPH/DG ADDL SEQ IV INF: CPT

## 2024-10-18 PROCEDURE — 700101 HCHG RX REV CODE 250: Performed by: STUDENT IN AN ORGANIZED HEALTH CARE EDUCATION/TRAINING PROGRAM

## 2024-10-18 PROCEDURE — 160039 HCHG SURGERY MINUTES - EA ADDL 1 MIN LEVEL 3: Performed by: STUDENT IN AN ORGANIZED HEALTH CARE EDUCATION/TRAINING PROGRAM

## 2024-10-18 PROCEDURE — 87641 MR-STAPH DNA AMP PROBE: CPT

## 2024-10-18 PROCEDURE — 80048 BASIC METABOLIC PNL TOTAL CA: CPT

## 2024-10-18 PROCEDURE — 700111 HCHG RX REV CODE 636 W/ 250 OVERRIDE (IP): Performed by: STUDENT IN AN ORGANIZED HEALTH CARE EDUCATION/TRAINING PROGRAM

## 2024-10-18 PROCEDURE — 99222 1ST HOSP IP/OBS MODERATE 55: CPT | Mod: 57 | Performed by: STUDENT IN AN ORGANIZED HEALTH CARE EDUCATION/TRAINING PROGRAM

## 2024-10-18 PROCEDURE — 160028 HCHG SURGERY MINUTES - 1ST 30 MINS LEVEL 3: Performed by: STUDENT IN AN ORGANIZED HEALTH CARE EDUCATION/TRAINING PROGRAM

## 2024-10-18 PROCEDURE — 770001 HCHG ROOM/CARE - MED/SURG/GYN PRIV*

## 2024-10-18 PROCEDURE — 84703 CHORIONIC GONADOTROPIN ASSAY: CPT

## 2024-10-18 PROCEDURE — 87077 CULTURE AEROBIC IDENTIFY: CPT | Mod: 91

## 2024-10-18 PROCEDURE — 700102 HCHG RX REV CODE 250 W/ 637 OVERRIDE(OP): Performed by: STUDENT IN AN ORGANIZED HEALTH CARE EDUCATION/TRAINING PROGRAM

## 2024-10-18 PROCEDURE — 160035 HCHG PACU - 1ST 60 MINS PHASE I: Performed by: STUDENT IN AN ORGANIZED HEALTH CARE EDUCATION/TRAINING PROGRAM

## 2024-10-18 PROCEDURE — 87186 SC STD MICRODIL/AGAR DIL: CPT | Mod: 91

## 2024-10-18 PROCEDURE — 85025 COMPLETE CBC W/AUTO DIFF WBC: CPT

## 2024-10-18 PROCEDURE — 87205 SMEAR GRAM STAIN: CPT | Mod: 91

## 2024-10-18 PROCEDURE — 01N50ZZ RELEASE MEDIAN NERVE, OPEN APPROACH: ICD-10-PCS | Performed by: STUDENT IN AN ORGANIZED HEALTH CARE EDUCATION/TRAINING PROGRAM

## 2024-10-18 PROCEDURE — 160009 HCHG ANES TIME/MIN: Performed by: STUDENT IN AN ORGANIZED HEALTH CARE EDUCATION/TRAINING PROGRAM

## 2024-10-18 PROCEDURE — 700105 HCHG RX REV CODE 258: Performed by: STUDENT IN AN ORGANIZED HEALTH CARE EDUCATION/TRAINING PROGRAM

## 2024-10-18 PROCEDURE — 700111 HCHG RX REV CODE 636 W/ 250 OVERRIDE (IP): Mod: JZ

## 2024-10-18 PROCEDURE — 87075 CULTR BACTERIA EXCEPT BLOOD: CPT | Mod: 91

## 2024-10-18 PROCEDURE — 0JBJ0ZZ EXCISION OF RIGHT HAND SUBCUTANEOUS TISSUE AND FASCIA, OPEN APPROACH: ICD-10-PCS | Performed by: STUDENT IN AN ORGANIZED HEALTH CARE EDUCATION/TRAINING PROGRAM

## 2024-10-18 PROCEDURE — 96375 TX/PRO/DX INJ NEW DRUG ADDON: CPT

## 2024-10-18 PROCEDURE — 160036 HCHG PACU - EA ADDL 30 MINS PHASE I: Performed by: STUDENT IN AN ORGANIZED HEALTH CARE EDUCATION/TRAINING PROGRAM

## 2024-10-18 PROCEDURE — 160002 HCHG RECOVERY MINUTES (STAT): Performed by: STUDENT IN AN ORGANIZED HEALTH CARE EDUCATION/TRAINING PROGRAM

## 2024-10-18 PROCEDURE — 87076 CULTURE ANAEROBE IDENT EACH: CPT

## 2024-10-18 RX ORDER — MORPHINE SULFATE 4 MG/ML
4 INJECTION INTRAVENOUS EVERY 4 HOURS PRN
Status: COMPLETED | OUTPATIENT
Start: 2024-10-18 | End: 2024-10-18

## 2024-10-18 RX ORDER — LIDOCAINE HYDROCHLORIDE 20 MG/ML
INJECTION, SOLUTION EPIDURAL; INFILTRATION; INTRACAUDAL; PERINEURAL PRN
Status: DISCONTINUED | OUTPATIENT
Start: 2024-10-18 | End: 2024-10-18 | Stop reason: SURG

## 2024-10-18 RX ORDER — OXYCODONE HCL 5 MG/5 ML
5 SOLUTION, ORAL ORAL
Status: COMPLETED | OUTPATIENT
Start: 2024-10-18 | End: 2024-10-18

## 2024-10-18 RX ORDER — HYDROMORPHONE HYDROCHLORIDE 1 MG/ML
0.1 INJECTION, SOLUTION INTRAMUSCULAR; INTRAVENOUS; SUBCUTANEOUS
Status: DISCONTINUED | OUTPATIENT
Start: 2024-10-18 | End: 2024-10-18 | Stop reason: HOSPADM

## 2024-10-18 RX ORDER — MEPERIDINE HYDROCHLORIDE 25 MG/ML
INJECTION INTRAMUSCULAR; INTRAVENOUS; SUBCUTANEOUS
Status: COMPLETED
Start: 2024-10-18 | End: 2024-10-18

## 2024-10-18 RX ORDER — HYDROMORPHONE HYDROCHLORIDE 1 MG/ML
0.4 INJECTION, SOLUTION INTRAMUSCULAR; INTRAVENOUS; SUBCUTANEOUS
Status: DISCONTINUED | OUTPATIENT
Start: 2024-10-18 | End: 2024-10-18 | Stop reason: HOSPADM

## 2024-10-18 RX ORDER — ACETAMINOPHEN 500 MG
1000 TABLET ORAL EVERY 6 HOURS
Status: DISCONTINUED | OUTPATIENT
Start: 2024-10-18 | End: 2024-10-20 | Stop reason: HOSPADM

## 2024-10-18 RX ORDER — KETOROLAC TROMETHAMINE 15 MG/ML
INJECTION, SOLUTION INTRAMUSCULAR; INTRAVENOUS PRN
Status: DISCONTINUED | OUTPATIENT
Start: 2024-10-18 | End: 2024-10-18 | Stop reason: SURG

## 2024-10-18 RX ORDER — IBUPROFEN 600 MG/1
600 TABLET, FILM COATED ORAL EVERY 6 HOURS
Status: DISCONTINUED | OUTPATIENT
Start: 2024-10-18 | End: 2024-10-20 | Stop reason: HOSPADM

## 2024-10-18 RX ORDER — EPHEDRINE SULFATE 50 MG/ML
5 INJECTION, SOLUTION INTRAVENOUS
Status: DISCONTINUED | OUTPATIENT
Start: 2024-10-18 | End: 2024-10-18 | Stop reason: HOSPADM

## 2024-10-18 RX ORDER — HYDROMORPHONE HYDROCHLORIDE 1 MG/ML
0.2 INJECTION, SOLUTION INTRAMUSCULAR; INTRAVENOUS; SUBCUTANEOUS
Status: DISCONTINUED | OUTPATIENT
Start: 2024-10-18 | End: 2024-10-18 | Stop reason: HOSPADM

## 2024-10-18 RX ORDER — ONDANSETRON 2 MG/ML
4 INJECTION INTRAMUSCULAR; INTRAVENOUS
Status: DISCONTINUED | OUTPATIENT
Start: 2024-10-18 | End: 2024-10-18 | Stop reason: HOSPADM

## 2024-10-18 RX ORDER — SODIUM CHLORIDE 9 MG/ML
INJECTION, SOLUTION INTRAVENOUS CONTINUOUS
Status: DISCONTINUED | OUTPATIENT
Start: 2024-10-18 | End: 2024-10-18 | Stop reason: HOSPADM

## 2024-10-18 RX ORDER — ONDANSETRON 2 MG/ML
INJECTION INTRAMUSCULAR; INTRAVENOUS PRN
Status: DISCONTINUED | OUTPATIENT
Start: 2024-10-18 | End: 2024-10-18 | Stop reason: SURG

## 2024-10-18 RX ORDER — MEPERIDINE HYDROCHLORIDE 25 MG/ML
12.5 INJECTION INTRAMUSCULAR; INTRAVENOUS; SUBCUTANEOUS
Status: DISCONTINUED | OUTPATIENT
Start: 2024-10-18 | End: 2024-10-18 | Stop reason: HOSPADM

## 2024-10-18 RX ORDER — HYDRALAZINE HYDROCHLORIDE 20 MG/ML
5 INJECTION INTRAMUSCULAR; INTRAVENOUS
Status: DISCONTINUED | OUTPATIENT
Start: 2024-10-18 | End: 2024-10-18 | Stop reason: HOSPADM

## 2024-10-18 RX ORDER — MIDAZOLAM HYDROCHLORIDE 1 MG/ML
INJECTION INTRAMUSCULAR; INTRAVENOUS PRN
Status: DISCONTINUED | OUTPATIENT
Start: 2024-10-18 | End: 2024-10-18 | Stop reason: SURG

## 2024-10-18 RX ORDER — ONDANSETRON 2 MG/ML
4 INJECTION INTRAMUSCULAR; INTRAVENOUS EVERY 6 HOURS PRN
Status: COMPLETED | OUTPATIENT
Start: 2024-10-18 | End: 2024-10-18

## 2024-10-18 RX ORDER — HYDROMORPHONE HYDROCHLORIDE 1 MG/ML
0.5 INJECTION, SOLUTION INTRAMUSCULAR; INTRAVENOUS; SUBCUTANEOUS EVERY 4 HOURS PRN
Status: DISCONTINUED | OUTPATIENT
Start: 2024-10-18 | End: 2024-10-19

## 2024-10-18 RX ORDER — ONDANSETRON 4 MG/1
4 TABLET, ORALLY DISINTEGRATING ORAL EVERY 8 HOURS PRN
Status: DISCONTINUED | OUTPATIENT
Start: 2024-10-18 | End: 2024-10-20 | Stop reason: HOSPADM

## 2024-10-18 RX ORDER — CEFAZOLIN SODIUM 1 G/3ML
INJECTION, POWDER, FOR SOLUTION INTRAMUSCULAR; INTRAVENOUS PRN
Status: DISCONTINUED | OUTPATIENT
Start: 2024-10-18 | End: 2024-10-18 | Stop reason: SURG

## 2024-10-18 RX ORDER — HALOPERIDOL 5 MG/ML
1 INJECTION INTRAMUSCULAR
Status: DISCONTINUED | OUTPATIENT
Start: 2024-10-18 | End: 2024-10-18 | Stop reason: HOSPADM

## 2024-10-18 RX ORDER — DOCUSATE SODIUM 100 MG/1
100 CAPSULE, LIQUID FILLED ORAL 2 TIMES DAILY
Status: DISCONTINUED | OUTPATIENT
Start: 2024-10-18 | End: 2024-10-20 | Stop reason: HOSPADM

## 2024-10-18 RX ORDER — SODIUM CHLORIDE 9 MG/ML
INJECTION, SOLUTION INTRAVENOUS
Status: DISCONTINUED | OUTPATIENT
Start: 2024-10-18 | End: 2024-10-18 | Stop reason: SURG

## 2024-10-18 RX ORDER — DIPHENHYDRAMINE HYDROCHLORIDE 50 MG/ML
12.5 INJECTION INTRAMUSCULAR; INTRAVENOUS
Status: DISCONTINUED | OUTPATIENT
Start: 2024-10-18 | End: 2024-10-18 | Stop reason: HOSPADM

## 2024-10-18 RX ORDER — OXYCODONE HYDROCHLORIDE 5 MG/1
5 TABLET ORAL EVERY 6 HOURS PRN
Status: DISCONTINUED | OUTPATIENT
Start: 2024-10-18 | End: 2024-10-20 | Stop reason: HOSPADM

## 2024-10-18 RX ORDER — ALBUTEROL SULFATE 5 MG/ML
2.5 SOLUTION RESPIRATORY (INHALATION)
Status: DISCONTINUED | OUTPATIENT
Start: 2024-10-18 | End: 2024-10-18 | Stop reason: HOSPADM

## 2024-10-18 RX ORDER — DEXAMETHASONE SODIUM PHOSPHATE 4 MG/ML
INJECTION, SOLUTION INTRA-ARTICULAR; INTRALESIONAL; INTRAMUSCULAR; INTRAVENOUS; SOFT TISSUE PRN
Status: DISCONTINUED | OUTPATIENT
Start: 2024-10-18 | End: 2024-10-18 | Stop reason: SURG

## 2024-10-18 RX ORDER — OXYCODONE HCL 5 MG/5 ML
10 SOLUTION, ORAL ORAL
Status: COMPLETED | OUTPATIENT
Start: 2024-10-18 | End: 2024-10-18

## 2024-10-18 RX ORDER — ROCURONIUM BROMIDE 10 MG/ML
INJECTION, SOLUTION INTRAVENOUS PRN
Status: DISCONTINUED | OUTPATIENT
Start: 2024-10-18 | End: 2024-10-18 | Stop reason: SURG

## 2024-10-18 RX ORDER — BUPIVACAINE HYDROCHLORIDE 2.5 MG/ML
INJECTION, SOLUTION EPIDURAL; INFILTRATION; INTRACAUDAL
Status: DISCONTINUED | OUTPATIENT
Start: 2024-10-18 | End: 2024-10-18 | Stop reason: HOSPADM

## 2024-10-18 RX ADMIN — KETOROLAC TROMETHAMINE 15 MG: 15 INJECTION, SOLUTION INTRAMUSCULAR; INTRAVENOUS at 20:57

## 2024-10-18 RX ADMIN — FENTANYL CITRATE 25 MCG: 50 INJECTION, SOLUTION INTRAMUSCULAR; INTRAVENOUS at 20:23

## 2024-10-18 RX ADMIN — ACETAMINOPHEN 1000 MG: 500 TABLET ORAL at 23:44

## 2024-10-18 RX ADMIN — FENTANYL CITRATE 50 MCG: 50 INJECTION, SOLUTION INTRAMUSCULAR; INTRAVENOUS at 22:02

## 2024-10-18 RX ADMIN — SODIUM CHLORIDE: 9 INJECTION, SOLUTION INTRAVENOUS at 19:58

## 2024-10-18 RX ADMIN — VANCOMYCIN HYDROCHLORIDE 3 G: 1 INJECTION, POWDER, LYOPHILIZED, FOR SOLUTION INTRAVENOUS at 20:41

## 2024-10-18 RX ADMIN — DOCUSATE SODIUM 100 MG: 100 CAPSULE, LIQUID FILLED ORAL at 23:44

## 2024-10-18 RX ADMIN — MORPHINE SULFATE 4 MG: 4 INJECTION, SOLUTION INTRAMUSCULAR; INTRAVENOUS at 15:53

## 2024-10-18 RX ADMIN — ONDANSETRON 4 MG: 2 INJECTION INTRAMUSCULAR; INTRAVENOUS at 20:57

## 2024-10-18 RX ADMIN — ROCURONIUM BROMIDE 10 MG: 50 INJECTION, SOLUTION INTRAVENOUS at 20:09

## 2024-10-18 RX ADMIN — HYDROMORPHONE HYDROCHLORIDE 0.4 MG: 1 INJECTION, SOLUTION INTRAMUSCULAR; INTRAVENOUS; SUBCUTANEOUS at 22:17

## 2024-10-18 RX ADMIN — MEPERIDINE HYDROCHLORIDE 12.5 MG: 25 INJECTION INTRAMUSCULAR; INTRAVENOUS; SUBCUTANEOUS at 21:34

## 2024-10-18 RX ADMIN — CEFAZOLIN 3 G: 1 INJECTION, POWDER, FOR SOLUTION INTRAMUSCULAR; INTRAVENOUS at 20:05

## 2024-10-18 RX ADMIN — OXYCODONE HYDROCHLORIDE 10 MG: 5 SOLUTION ORAL at 22:17

## 2024-10-18 RX ADMIN — FENTANYL CITRATE 50 MCG: 50 INJECTION, SOLUTION INTRAMUSCULAR; INTRAVENOUS at 21:50

## 2024-10-18 RX ADMIN — MEPERIDINE HYDROCHLORIDE 12.5 MG: 25 INJECTION INTRAMUSCULAR; INTRAVENOUS; SUBCUTANEOUS at 21:27

## 2024-10-18 RX ADMIN — IBUPROFEN 600 MG: 600 TABLET, FILM COATED ORAL at 23:44

## 2024-10-18 RX ADMIN — DEXAMETHASONE SODIUM PHOSPHATE 8 MG: 4 INJECTION INTRA-ARTICULAR; INTRALESIONAL; INTRAMUSCULAR; INTRAVENOUS; SOFT TISSUE at 20:05

## 2024-10-18 RX ADMIN — MIDAZOLAM HYDROCHLORIDE 2 MG: 2 INJECTION, SOLUTION INTRAMUSCULAR; INTRAVENOUS at 19:56

## 2024-10-18 RX ADMIN — LIDOCAINE HYDROCHLORIDE 100 MG: 20 INJECTION, SOLUTION EPIDURAL; INFILTRATION; INTRACAUDAL at 20:02

## 2024-10-18 RX ADMIN — PROPOFOL 200 MG: 10 INJECTION, EMULSION INTRAVENOUS at 20:02

## 2024-10-18 RX ADMIN — FENTANYL CITRATE 50 MCG: 50 INJECTION, SOLUTION INTRAMUSCULAR; INTRAVENOUS at 20:54

## 2024-10-18 RX ADMIN — FENTANYL CITRATE 75 MCG: 50 INJECTION, SOLUTION INTRAMUSCULAR; INTRAVENOUS at 20:14

## 2024-10-18 RX ADMIN — FENTANYL CITRATE 50 MCG: 50 INJECTION, SOLUTION INTRAMUSCULAR; INTRAVENOUS at 20:34

## 2024-10-18 RX ADMIN — ONDANSETRON 4 MG: 2 INJECTION INTRAMUSCULAR; INTRAVENOUS at 15:53

## 2024-10-18 RX ADMIN — AMPICILLIN AND SULBACTAM 3 G: 1; 2 INJECTION, POWDER, FOR SOLUTION INTRAMUSCULAR; INTRAVENOUS at 15:53

## 2024-10-18 SDOH — ECONOMIC STABILITY: TRANSPORTATION INSECURITY
IN THE PAST 12 MONTHS, HAS LACK OF RELIABLE TRANSPORTATION KEPT YOU FROM MEDICAL APPOINTMENTS, MEETINGS, WORK OR FROM GETTING THINGS NEEDED FOR DAILY LIVING?: NO

## 2024-10-18 SDOH — ECONOMIC STABILITY: TRANSPORTATION INSECURITY
IN THE PAST 12 MONTHS, HAS THE LACK OF TRANSPORTATION KEPT YOU FROM MEDICAL APPOINTMENTS OR FROM GETTING MEDICATIONS?: NO

## 2024-10-18 ASSESSMENT — PAIN DESCRIPTION - PAIN TYPE
TYPE: SURGICAL PAIN
TYPE: ACUTE PAIN
TYPE: ACUTE PAIN
TYPE: SURGICAL PAIN
TYPE: SURGICAL PAIN

## 2024-10-18 ASSESSMENT — SOCIAL DETERMINANTS OF HEALTH (SDOH)
IN THE PAST 12 MONTHS, HAS THE ELECTRIC, GAS, OIL, OR WATER COMPANY THREATENED TO SHUT OFF SERVICE IN YOUR HOME?: NO
WITHIN THE LAST YEAR, HAVE YOU BEEN KICKED, HIT, SLAPPED, OR OTHERWISE PHYSICALLY HURT BY YOUR PARTNER OR EX-PARTNER?: NO
WITHIN THE PAST 12 MONTHS, THE FOOD YOU BOUGHT JUST DIDN'T LAST AND YOU DIDN'T HAVE MONEY TO GET MORE: NEVER TRUE
WITHIN THE LAST YEAR, HAVE YOU BEEN AFRAID OF YOUR PARTNER OR EX-PARTNER?: NO
WITHIN THE LAST YEAR, HAVE TO BEEN RAPED OR FORCED TO HAVE ANY KIND OF SEXUAL ACTIVITY BY YOUR PARTNER OR EX-PARTNER?: NO
WITHIN THE PAST 12 MONTHS, YOU WORRIED THAT YOUR FOOD WOULD RUN OUT BEFORE YOU GOT THE MONEY TO BUY MORE: NEVER TRUE
WITHIN THE LAST YEAR, HAVE YOU BEEN HUMILIATED OR EMOTIONALLY ABUSED IN OTHER WAYS BY YOUR PARTNER OR EX-PARTNER?: NO

## 2024-10-18 ASSESSMENT — LIFESTYLE VARIABLES
DOES PATIENT WANT TO STOP DRINKING: NO
EVER FELT BAD OR GUILTY ABOUT YOUR DRINKING: NO
HAVE PEOPLE ANNOYED YOU BY CRITICIZING YOUR DRINKING: NO
HOW MANY TIMES IN THE PAST YEAR HAVE YOU HAD 5 OR MORE DRINKS IN A DAY: 0
TOTAL SCORE: 0
EVER HAD A DRINK FIRST THING IN THE MORNING TO STEADY YOUR NERVES TO GET RID OF A HANGOVER: NO
ON A TYPICAL DAY WHEN YOU DRINK ALCOHOL HOW MANY DRINKS DO YOU HAVE: 0
CONSUMPTION TOTAL: NEGATIVE
TOTAL SCORE: 0
TOTAL SCORE: 0
HAVE YOU EVER FELT YOU SHOULD CUT DOWN ON YOUR DRINKING: NO
AVERAGE NUMBER OF DAYS PER WEEK YOU HAVE A DRINK CONTAINING ALCOHOL: 0
ALCOHOL_USE: NO

## 2024-10-18 ASSESSMENT — PAIN SCALES - GENERAL: PAIN_LEVEL: 5

## 2024-10-18 ASSESSMENT — FIBROSIS 4 INDEX
FIB4 SCORE: 0.3
FIB4 SCORE: 0.25

## 2024-10-19 LAB
FUNGUS SPEC FUNGUS STN: NORMAL
GRAM STN SPEC: NORMAL
SCCMEC + MECA PNL NOSE NAA+PROBE: NEGATIVE
SIGNIFICANT IND 70042: NORMAL
SITE SITE: NORMAL
SOURCE SOURCE: NORMAL

## 2024-10-19 PROCEDURE — 700102 HCHG RX REV CODE 250 W/ 637 OVERRIDE(OP): Performed by: STUDENT IN AN ORGANIZED HEALTH CARE EDUCATION/TRAINING PROGRAM

## 2024-10-19 PROCEDURE — 700105 HCHG RX REV CODE 258: Performed by: STUDENT IN AN ORGANIZED HEALTH CARE EDUCATION/TRAINING PROGRAM

## 2024-10-19 PROCEDURE — 97760 ORTHOTIC MGMT&TRAING 1ST ENC: CPT

## 2024-10-19 PROCEDURE — 700111 HCHG RX REV CODE 636 W/ 250 OVERRIDE (IP): Performed by: STUDENT IN AN ORGANIZED HEALTH CARE EDUCATION/TRAINING PROGRAM

## 2024-10-19 PROCEDURE — A9270 NON-COVERED ITEM OR SERVICE: HCPCS | Performed by: STUDENT IN AN ORGANIZED HEALTH CARE EDUCATION/TRAINING PROGRAM

## 2024-10-19 PROCEDURE — A4570 SPLINT: HCPCS

## 2024-10-19 PROCEDURE — 770001 HCHG ROOM/CARE - MED/SURG/GYN PRIV*

## 2024-10-19 PROCEDURE — 97166 OT EVAL MOD COMPLEX 45 MIN: CPT

## 2024-10-19 RX ORDER — VANCOMYCIN 1.5 G/300ML
1500 INJECTION, SOLUTION INTRAVENOUS EVERY 8 HOURS
Status: DISCONTINUED | OUTPATIENT
Start: 2024-10-19 | End: 2024-10-19

## 2024-10-19 RX ORDER — ENOXAPARIN SODIUM 100 MG/ML
40 INJECTION SUBCUTANEOUS DAILY
Status: DISCONTINUED | OUTPATIENT
Start: 2024-10-19 | End: 2024-10-20 | Stop reason: HOSPADM

## 2024-10-19 RX ADMIN — OXYCODONE 5 MG: 5 TABLET ORAL at 00:38

## 2024-10-19 RX ADMIN — ACETAMINOPHEN 1000 MG: 500 TABLET ORAL at 17:58

## 2024-10-19 RX ADMIN — AMPICILLIN AND SULBACTAM 3 G: 1; 2 INJECTION, POWDER, FOR SOLUTION INTRAMUSCULAR; INTRAVENOUS at 00:05

## 2024-10-19 RX ADMIN — DOCUSATE SODIUM 100 MG: 100 CAPSULE, LIQUID FILLED ORAL at 04:51

## 2024-10-19 RX ADMIN — OXYCODONE 5 MG: 5 TABLET ORAL at 15:25

## 2024-10-19 RX ADMIN — ENOXAPARIN SODIUM 40 MG: 100 INJECTION SUBCUTANEOUS at 17:58

## 2024-10-19 RX ADMIN — AMPICILLIN AND SULBACTAM 3 G: 1; 2 INJECTION, POWDER, FOR SOLUTION INTRAMUSCULAR; INTRAVENOUS at 11:38

## 2024-10-19 RX ADMIN — AMPICILLIN AND SULBACTAM 3 G: 1; 2 INJECTION, POWDER, FOR SOLUTION INTRAMUSCULAR; INTRAVENOUS at 18:02

## 2024-10-19 RX ADMIN — ONDANSETRON 4 MG: 4 TABLET, ORALLY DISINTEGRATING ORAL at 21:25

## 2024-10-19 RX ADMIN — ONDANSETRON 4 MG: 4 TABLET, ORALLY DISINTEGRATING ORAL at 04:59

## 2024-10-19 RX ADMIN — OXYCODONE 5 MG: 5 TABLET ORAL at 21:25

## 2024-10-19 RX ADMIN — IBUPROFEN 600 MG: 600 TABLET, FILM COATED ORAL at 17:58

## 2024-10-19 RX ADMIN — AMPICILLIN AND SULBACTAM 3 G: 1; 2 INJECTION, POWDER, FOR SOLUTION INTRAMUSCULAR; INTRAVENOUS at 06:56

## 2024-10-19 RX ADMIN — ACETAMINOPHEN 1000 MG: 500 TABLET ORAL at 04:50

## 2024-10-19 RX ADMIN — VANCOMYCIN 1500 MG: 1.5 INJECTION, SOLUTION INTRAVENOUS at 04:50

## 2024-10-19 RX ADMIN — IBUPROFEN 600 MG: 600 TABLET, FILM COATED ORAL at 11:35

## 2024-10-19 RX ADMIN — DOCUSATE SODIUM 100 MG: 100 CAPSULE, LIQUID FILLED ORAL at 17:58

## 2024-10-19 RX ADMIN — IBUPROFEN 600 MG: 600 TABLET, FILM COATED ORAL at 04:51

## 2024-10-19 RX ADMIN — VANCOMYCIN 1500 MG: 1.5 INJECTION, SOLUTION INTRAVENOUS at 12:46

## 2024-10-19 RX ADMIN — ACETAMINOPHEN 1000 MG: 500 TABLET ORAL at 11:35

## 2024-10-19 ASSESSMENT — PAIN DESCRIPTION - PAIN TYPE
TYPE: SURGICAL PAIN

## 2024-10-19 ASSESSMENT — COGNITIVE AND FUNCTIONAL STATUS - GENERAL
SUGGESTED CMS G CODE MODIFIER DAILY ACTIVITY: CK
HELP NEEDED FOR BATHING: A LITTLE
DRESSING REGULAR UPPER BODY CLOTHING: A LITTLE
MOBILITY SCORE: 24
HELP NEEDED FOR BATHING: A LITTLE
EATING MEALS: A LITTLE
SUGGESTED CMS G CODE MODIFIER DAILY ACTIVITY: CJ
TOILETING: A LITTLE
DRESSING REGULAR LOWER BODY CLOTHING: A LITTLE
DRESSING REGULAR LOWER BODY CLOTHING: A LITTLE
SUGGESTED CMS G CODE MODIFIER MOBILITY: CH
DAILY ACTIVITIY SCORE: 20
PERSONAL GROOMING: A LITTLE
TOILETING: A LITTLE
DAILY ACTIVITIY SCORE: 18
DRESSING REGULAR UPPER BODY CLOTHING: A LITTLE

## 2024-10-19 ASSESSMENT — ACTIVITIES OF DAILY LIVING (ADL): TOILETING: INDEPENDENT

## 2024-10-20 ENCOUNTER — PHARMACY VISIT (OUTPATIENT)
Dept: PHARMACY | Facility: MEDICAL CENTER | Age: 19
End: 2024-10-20
Payer: COMMERCIAL

## 2024-10-20 VITALS
RESPIRATION RATE: 17 BRPM | TEMPERATURE: 97.7 F | HEART RATE: 72 BPM | BODY MASS INDEX: 41.7 KG/M2 | SYSTOLIC BLOOD PRESSURE: 154 MMHG | OXYGEN SATURATION: 97 % | HEIGHT: 67 IN | WEIGHT: 265.65 LBS | DIASTOLIC BLOOD PRESSURE: 88 MMHG

## 2024-10-20 PROCEDURE — 97110 THERAPEUTIC EXERCISES: CPT

## 2024-10-20 PROCEDURE — RXMED WILLOW AMBULATORY MEDICATION CHARGE: Performed by: STUDENT IN AN ORGANIZED HEALTH CARE EDUCATION/TRAINING PROGRAM

## 2024-10-20 PROCEDURE — 3E02340 INTRODUCTION OF INFLUENZA VACCINE INTO MUSCLE, PERCUTANEOUS APPROACH: ICD-10-PCS | Performed by: STUDENT IN AN ORGANIZED HEALTH CARE EDUCATION/TRAINING PROGRAM

## 2024-10-20 PROCEDURE — A9270 NON-COVERED ITEM OR SERVICE: HCPCS | Performed by: STUDENT IN AN ORGANIZED HEALTH CARE EDUCATION/TRAINING PROGRAM

## 2024-10-20 PROCEDURE — 700102 HCHG RX REV CODE 250 W/ 637 OVERRIDE(OP): Performed by: STUDENT IN AN ORGANIZED HEALTH CARE EDUCATION/TRAINING PROGRAM

## 2024-10-20 PROCEDURE — 90471 IMMUNIZATION ADMIN: CPT

## 2024-10-20 PROCEDURE — 700111 HCHG RX REV CODE 636 W/ 250 OVERRIDE (IP): Performed by: STUDENT IN AN ORGANIZED HEALTH CARE EDUCATION/TRAINING PROGRAM

## 2024-10-20 PROCEDURE — 700105 HCHG RX REV CODE 258: Performed by: STUDENT IN AN ORGANIZED HEALTH CARE EDUCATION/TRAINING PROGRAM

## 2024-10-20 PROCEDURE — 90662 IIV NO PRSV INCREASED AG IM: CPT | Performed by: STUDENT IN AN ORGANIZED HEALTH CARE EDUCATION/TRAINING PROGRAM

## 2024-10-20 RX ORDER — OXYCODONE HYDROCHLORIDE 5 MG/1
5 TABLET ORAL EVERY 6 HOURS PRN
Qty: 3 TABLET | Refills: 0 | Status: SHIPPED | OUTPATIENT
Start: 2024-10-20 | End: 2024-10-22

## 2024-10-20 RX ORDER — SULFAMETHOXAZOLE AND TRIMETHOPRIM 800; 160 MG/1; MG/1
1 TABLET ORAL 2 TIMES DAILY
Qty: 6 TABLET | Refills: 0 | Status: ACTIVE | OUTPATIENT
Start: 2024-10-20 | End: 2024-10-23

## 2024-10-20 RX ORDER — ACETAMINOPHEN 500 MG
1000 TABLET ORAL EVERY 6 HOURS
Qty: 56 TABLET | Refills: 0 | Status: SHIPPED | OUTPATIENT
Start: 2024-10-20 | End: 2024-10-27

## 2024-10-20 RX ORDER — GINSENG 100 MG
1 CAPSULE ORAL 2 TIMES DAILY
Qty: 28 G | Refills: 0 | Status: SHIPPED | OUTPATIENT
Start: 2024-10-20 | End: 2024-10-27

## 2024-10-20 RX ORDER — METRONIDAZOLE 500 MG/1
500 TABLET ORAL EVERY 8 HOURS
Qty: 9 TABLET | Refills: 0 | Status: ACTIVE | OUTPATIENT
Start: 2024-10-20 | End: 2024-10-23

## 2024-10-20 RX ORDER — IBUPROFEN 600 MG/1
600 TABLET, FILM COATED ORAL EVERY 6 HOURS
Qty: 28 TABLET | Refills: 0 | Status: SHIPPED | OUTPATIENT
Start: 2024-10-20 | End: 2024-10-27

## 2024-10-20 RX ADMIN — ACETAMINOPHEN 1000 MG: 500 TABLET ORAL at 06:13

## 2024-10-20 RX ADMIN — AMPICILLIN AND SULBACTAM 3 G: 1; 2 INJECTION, POWDER, FOR SOLUTION INTRAMUSCULAR; INTRAVENOUS at 06:15

## 2024-10-20 RX ADMIN — ACETAMINOPHEN 1000 MG: 500 TABLET ORAL at 00:11

## 2024-10-20 RX ADMIN — AMPICILLIN AND SULBACTAM 3 G: 1; 2 INJECTION, POWDER, FOR SOLUTION INTRAMUSCULAR; INTRAVENOUS at 00:14

## 2024-10-20 RX ADMIN — IBUPROFEN 600 MG: 600 TABLET, FILM COATED ORAL at 06:13

## 2024-10-20 RX ADMIN — DOCUSATE SODIUM 100 MG: 100 CAPSULE, LIQUID FILLED ORAL at 06:13

## 2024-10-20 RX ADMIN — IBUPROFEN 600 MG: 600 TABLET, FILM COATED ORAL at 00:11

## 2024-10-20 RX ADMIN — INFLUENZA A VIRUS A/VICTORIA/4897/2022 IVR-238 (H1N1) ANTIGEN (FORMALDEHYDE INACTIVATED), INFLUENZA A VIRUS A/CALIFORNIA/122/2022 SAN-022 (H3N2) ANTIGEN (FORMALDEHYDE INACTIVATED), AND INFLUENZA B VIRUS B/MICHIGAN/01/2021 ANTIGEN (FORMALDEHYDE INACTIVATED) 0.5 ML: 60; 60; 60 INJECTION, SUSPENSION INTRAMUSCULAR at 11:36

## 2024-10-20 ASSESSMENT — COGNITIVE AND FUNCTIONAL STATUS - GENERAL
SUGGESTED CMS G CODE MODIFIER DAILY ACTIVITY: CK
DRESSING REGULAR LOWER BODY CLOTHING: A LITTLE
PERSONAL GROOMING: A LITTLE
DAILY ACTIVITIY SCORE: 18
DRESSING REGULAR UPPER BODY CLOTHING: A LITTLE
TOILETING: A LITTLE
EATING MEALS: A LITTLE
HELP NEEDED FOR BATHING: A LITTLE

## 2024-10-20 ASSESSMENT — PAIN DESCRIPTION - PAIN TYPE: TYPE: SURGICAL PAIN

## 2024-10-21 ENCOUNTER — TELEPHONE (OUTPATIENT)
Dept: HEALTH INFORMATION MANAGEMENT | Facility: OTHER | Age: 19
End: 2024-10-21
Payer: MEDICAID

## 2024-10-22 LAB
BACTERIA SPEC ANAEROBE CULT: NORMAL
BACTERIA WND AEROBE CULT: ABNORMAL
FUNGUS SPEC CULT: NORMAL
FUNGUS SPEC FUNGUS STN: NORMAL
GRAM STN SPEC: ABNORMAL
SIGNIFICANT IND 70042: ABNORMAL
SIGNIFICANT IND 70042: NORMAL
SITE SITE: ABNORMAL
SITE SITE: NORMAL
SOURCE SOURCE: ABNORMAL
SOURCE SOURCE: NORMAL

## 2024-10-23 LAB
BACTERIA SPEC ANAEROBE CULT: ABNORMAL
SIGNIFICANT IND 70042: ABNORMAL
SITE SITE: ABNORMAL
SOURCE SOURCE: ABNORMAL

## 2024-10-24 LAB
BACTERIA WND AEROBE CULT: ABNORMAL
BACTERIA WND AEROBE CULT: ABNORMAL
GRAM STN SPEC: ABNORMAL
SIGNIFICANT IND 70042: ABNORMAL
SITE SITE: ABNORMAL
SOURCE SOURCE: ABNORMAL

## 2024-10-31 ENCOUNTER — OCCUPATIONAL THERAPY (OUTPATIENT)
Dept: OCCUPATIONAL THERAPY | Facility: REHABILITATION | Age: 19
End: 2024-10-31
Attending: STUDENT IN AN ORGANIZED HEALTH CARE EDUCATION/TRAINING PROGRAM
Payer: MEDICAID

## 2024-10-31 DIAGNOSIS — M79.641 RIGHT HAND PAIN: ICD-10-CM

## 2024-10-31 PROCEDURE — 97110 THERAPEUTIC EXERCISES: CPT

## 2024-10-31 ASSESSMENT — ENCOUNTER SYMPTOMS
TACTILE HYPERESTHESIA: 1
PAIN SCALE: 4
PAIN SCALE AT HIGHEST: 10
HAND SWELLING: 1

## 2024-11-01 ENCOUNTER — HOSPITAL ENCOUNTER (EMERGENCY)
Facility: MEDICAL CENTER | Age: 19
End: 2024-11-01
Attending: STUDENT IN AN ORGANIZED HEALTH CARE EDUCATION/TRAINING PROGRAM
Payer: MEDICAID

## 2024-11-01 ENCOUNTER — PHARMACY VISIT (OUTPATIENT)
Dept: PHARMACY | Facility: MEDICAL CENTER | Age: 19
End: 2024-11-01
Payer: COMMERCIAL

## 2024-11-01 VITALS
RESPIRATION RATE: 16 BRPM | HEART RATE: 98 BPM | WEIGHT: 265 LBS | DIASTOLIC BLOOD PRESSURE: 80 MMHG | TEMPERATURE: 98 F | OXYGEN SATURATION: 96 % | BODY MASS INDEX: 41.59 KG/M2 | HEIGHT: 67 IN | SYSTOLIC BLOOD PRESSURE: 122 MMHG
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
## 2024-11-01 DIAGNOSIS — T24.212A PARTIAL THICKNESS BURN OF LEFT THIGH, INITIAL ENCOUNTER: ICD-10-CM

## 2024-11-01 PROCEDURE — 304217 HCHG IRRIGATION SYSTEM

## 2024-11-01 PROCEDURE — 700102 HCHG RX REV CODE 250 W/ 637 OVERRIDE(OP): Mod: UD | Performed by: STUDENT IN AN ORGANIZED HEALTH CARE EDUCATION/TRAINING PROGRAM

## 2024-11-01 PROCEDURE — A6403 STERILE GAUZE>16 <= 48 SQ IN: HCPCS

## 2024-11-01 PROCEDURE — RXMED WILLOW AMBULATORY MEDICATION CHARGE: Performed by: STUDENT IN AN ORGANIZED HEALTH CARE EDUCATION/TRAINING PROGRAM

## 2024-11-01 PROCEDURE — 700101 HCHG RX REV CODE 250: Mod: UD | Performed by: STUDENT IN AN ORGANIZED HEALTH CARE EDUCATION/TRAINING PROGRAM

## 2024-11-01 PROCEDURE — 99284 EMERGENCY DEPT VISIT MOD MDM: CPT

## 2024-11-01 PROCEDURE — 16020 DRESS/DEBRID P-THICK BURN S: CPT

## 2024-11-01 PROCEDURE — A9270 NON-COVERED ITEM OR SERVICE: HCPCS | Mod: UD | Performed by: STUDENT IN AN ORGANIZED HEALTH CARE EDUCATION/TRAINING PROGRAM

## 2024-11-01 RX ORDER — OXYCODONE HYDROCHLORIDE 5 MG/1
5 TABLET ORAL ONCE
Status: COMPLETED | OUTPATIENT
Start: 2024-11-01 | End: 2024-11-01

## 2024-11-01 RX ORDER — IBUPROFEN 400 MG/1
400 TABLET, FILM COATED ORAL EVERY 6 HOURS PRN
Qty: 30 TABLET | Refills: 0 | Status: SHIPPED | OUTPATIENT
Start: 2024-11-01

## 2024-11-01 RX ORDER — BUPIVACAINE HYDROCHLORIDE AND EPINEPHRINE 5; 5 MG/ML; UG/ML
10 INJECTION, SOLUTION EPIDURAL; INTRACAUDAL; PERINEURAL ONCE
Status: COMPLETED | OUTPATIENT
Start: 2024-11-01 | End: 2024-11-01

## 2024-11-01 RX ORDER — ACETAMINOPHEN 500 MG
1000 TABLET ORAL EVERY 6 HOURS PRN
Qty: 30 TABLET | Refills: 0 | Status: SHIPPED | OUTPATIENT
Start: 2024-11-01

## 2024-11-01 RX ADMIN — OXYCODONE 5 MG: 5 TABLET ORAL at 20:13

## 2024-11-01 RX ADMIN — OXYCODONE 5 MG: 5 TABLET ORAL at 21:29

## 2024-11-01 RX ADMIN — AMOXICILLIN AND CLAVULANATE POTASSIUM 1 TABLET: 875; 125 TABLET, FILM COATED ORAL at 20:13

## 2024-11-01 RX ADMIN — BUPIVACAINE HYDROCHLORIDE AND EPINEPHRINE BITARTRATE 10 ML: 5; .0091 INJECTION, SOLUTION EPIDURAL; INTRACAUDAL; PERINEURAL at 20:55

## 2024-11-01 ASSESSMENT — FIBROSIS 4 INDEX: FIB4 SCORE: 0.25
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
## 2024-11-11 LAB
FUNGUS SPEC CULT: NORMAL
FUNGUS SPEC FUNGUS STN: NORMAL
SIGNIFICANT IND 70042: NORMAL
SITE SITE: NORMAL
SOURCE SOURCE: NORMAL

## 2024-11-21 ENCOUNTER — APPOINTMENT (OUTPATIENT)
Dept: OCCUPATIONAL THERAPY | Facility: REHABILITATION | Age: 19
End: 2024-11-21
Attending: STUDENT IN AN ORGANIZED HEALTH CARE EDUCATION/TRAINING PROGRAM
Payer: MEDICAID

## 2024-11-26 ENCOUNTER — OCCUPATIONAL THERAPY (OUTPATIENT)
Dept: OCCUPATIONAL THERAPY | Facility: REHABILITATION | Age: 19
End: 2024-11-26
Attending: STUDENT IN AN ORGANIZED HEALTH CARE EDUCATION/TRAINING PROGRAM
Payer: MEDICAID

## 2024-11-26 DIAGNOSIS — M79.641 RIGHT HAND PAIN: ICD-10-CM

## 2024-11-26 PROCEDURE — 97530 THERAPEUTIC ACTIVITIES: CPT

## 2024-11-26 PROCEDURE — 97110 THERAPEUTIC EXERCISES: CPT

## 2024-11-26 NOTE — OP THERAPY DAILY TREATMENT
Outpatient Occupational Therapy  DAILY TREATMENT     [facility] Occupational [address].  [address]  [place] [address]  Phone:  [phone]  Fax:  [phone]    Date: 11/26/2024    Patient: Ani Archuleta  YOB: 2005  MRN: [id number]     Time Calculation  Start time: 1015  Stop time: 1100 Time Calculation (min): 45 minutes         Chief Complaint: Arm Problem    Visit #: 2    SUBJECTIVE:  Pt reports significant improvements in hand function, no pain, primary complaint is inability to straighten middle finger and lack of grasp strength impacting R dominant hand functional use for tasks such as brushing teeth and putting hair up    OBJECTIVE:  Current objective measures:   R: 0  L: 35          Therapeutic Exercises (CPT 15675):     1. putty squeeze, x30 reps, RUE, level II pink putty    2. putty roll for finger extension, x30 reps, RUE, level II pink putty    3. pinch and pull, x30 reps, RUE, level II pink putty    4. digit flexion putty pulls, x30 reps, RUE, level II pink putty    5. digit adduction, x30 reps, RUE, level II pink putty    Therapeutic Treatments and Modalities:    1. Therapeutic Activities (CPT 16601), RUE, see below    Therapeutic Treatments and Modalities Summary: IASTM to palm and volar surface of middle and ring finger, pt with increased sensitivity. Cupping x2 to palm at base of middle finger toward decreased tendon adhesion     Time-based treatments/modalities:         Pain rating before treatment: 0  Pain rating after treatment: 0    ASSESSMENT:   Response to treatment: Pt doing well overall, full ROM with the exception of stiffness in R middle finger resulting in lack of ~5 degrees range PIP extension, this was improved after cupping. Her  strength is zero, primarily limited by poor strength in middle and ring finger flexion, we issued hand strengthening HEP with good return demo, will continue to monitor and progress as tolerated.      PLAN/RECOMMENDATIONS:   Plan for treatment: therapy treatment to continue next visit.  Planned interventions for next visit: continue with current treatment

## 2024-12-02 NOTE — OP THERAPY DAILY TREATMENT
Outpatient Occupational Therapy  DAILY TREATMENT     [facility] Occupational [address].  [address]  [place] [address]  Phone:  [phone]  Fax:  [phone]    Date: 12/03/2024    Patient: Ani Archuleta  YOB: 2005  MRN: [id number]     Time Calculation                 Chief Complaint: No chief complaint on file.    Visit #: 3    SUBJECTIVE:  ***    OBJECTIVE:  Current objective measures: ***        Therapeutic Exercises (CPT 12832):     1. putty squeeze, x30 reps, RUE, level II pink putty    2. putty roll for finger extension, x30 reps, RUE, level II pink putty    3. pinch and pull, x30 reps, RUE, level II pink putty    4. digit flexion putty pulls, x30 reps, RUE, level II pink putty    5. digit adduction, x30 reps, RUE, level II pink putty    Therapeutic Treatments and Modalities:    1. Therapeutic Activities (CPT 25561), RUE, see below    Therapeutic Treatments and Modalities Summary: IASTM to palm and volar surface of middle and ring finger, pt with increased sensitivity. Cupping x2 to palm at base of middle finger toward decreased tendon adhesion     Time-based treatments/modalities:           Pain rating before treatment: {PAIN NUMBERS_1-10:26016}  Pain rating after treatment: {PAIN NUMBERS_1-10:72494}    ASSESSMENT:   Response to treatment: ***    PLAN/RECOMMENDATIONS:   Plan for treatment: therapy treatment to continue next visit.  Planned interventions for next visit: continue with current treatment

## 2024-12-02 NOTE — OP THERAPY PROGRESS SUMMARY
Outpatient Occupational Therapy  PROGRESS SUMMARY NOTE    [facility] Occupational Therapy [address].  [address]  [place] [address]  Phone:  [phone]  Fax:  [phone]    Date of Visit: 12/03/2024    Patient: Ani Archuleta  YOB: 2005  MRN: [id number]     Referring Provider: Veronica Lyn M.D.  [address] [place] [address]   Referring Diagnosis Pain in right hand [M79.641]     Visit Diagnoses     ICD-10-CM   1. Right hand pain  M79.641       Rehab Potential: good    Progress Report Period: 10/31/24-12/2/24     Functional Assessment Used    R: 0  L: 35      Right Wrist   Normal active range of motion  Wrist flexion: WFL and with pain  Wrist extension: WFl  Wrist pronation: WFL  Wrist supination: WFL  Radial deviation: WFL  Ulnar deviation: WFL     Right Thumb     Normal active range of motion  Opposition: Guarded with movement, pain/tension with thumb retropulsion full range passively      Additional Active Range of Motion Details  Reduced digit extension in pips ~5 degrees, reduced digit flexion in pips/dips/mcps due to pain      Objective Findings and Assessment:   Patient progression towards goals: Short Term Goals: decrease edema, decrease hypersensitivity, decrease pain, increase ADL independence, increase range of motion, increase soft tissue/skin mobility, increase strength and independent with HEP performance  Short term goal timespan:  2-4 weeks  Patient progress towards short term goals:  Went over HEP, taping for skin mobility      Long Term Goals:   Patient will be independent with HEP for management of symptoms  Patient will report no greater than 2/10 pain in hand for 1 week  Patient will use RUE to wash and brush hair with no pain   Long term goal timespan:  6-8 weeks      Goals:   Short Term Goals:   Patient will be independent with HEP for management of symptoms  Patient will report no greater than 2/10 pain in hand for 1 week  Patient  will use RUE to wash and brush hair with no pain       Short term goal timespan:  2-4 weeks  Long term goal timespan:  6-8 weeks    Plan:   Planned therapy interventions:  Therapeutic Activities (CPT 66550) and Therapeutic Exercise (CPT 71192)      Referring provider co-signature:  I have reviewed this plan of care and my co-signature certifies the need for services.    Certification Period: 12/03/2024 to 03/02/25    Physician Signature: ________________________________ Date: ______________

## 2024-12-03 ENCOUNTER — APPOINTMENT (OUTPATIENT)
Dept: OCCUPATIONAL THERAPY | Facility: REHABILITATION | Age: 19
End: 2024-12-03
Attending: STUDENT IN AN ORGANIZED HEALTH CARE EDUCATION/TRAINING PROGRAM
Payer: MEDICAID

## 2024-12-10 ENCOUNTER — APPOINTMENT (OUTPATIENT)
Dept: OCCUPATIONAL THERAPY | Facility: REHABILITATION | Age: 19
End: 2024-12-10
Attending: STUDENT IN AN ORGANIZED HEALTH CARE EDUCATION/TRAINING PROGRAM
Payer: MEDICAID

## 2024-12-12 ENCOUNTER — APPOINTMENT (OUTPATIENT)
Dept: OCCUPATIONAL THERAPY | Facility: REHABILITATION | Age: 19
End: 2024-12-12
Attending: STUDENT IN AN ORGANIZED HEALTH CARE EDUCATION/TRAINING PROGRAM
Payer: MEDICAID

## 2024-12-17 ENCOUNTER — OCCUPATIONAL THERAPY (OUTPATIENT)
Dept: OCCUPATIONAL THERAPY | Facility: REHABILITATION | Age: 19
End: 2024-12-17
Attending: STUDENT IN AN ORGANIZED HEALTH CARE EDUCATION/TRAINING PROGRAM
Payer: MEDICAID

## 2024-12-17 DIAGNOSIS — M79.641 RIGHT HAND PAIN: ICD-10-CM

## 2024-12-17 PROCEDURE — 97110 THERAPEUTIC EXERCISES: CPT

## 2024-12-17 NOTE — OP THERAPY DAILY TREATMENT
Outpatient Occupational Therapy  DAILY TREATMENT     [facility] Occupational [address].  [address]  [place] [address]  Phone:  [phone]  Fax:  [phone]    Date: 12/17/2024    Patient: Ani Archuleta  YOB: 2005  MRN: [id number]     Time Calculation                 Chief Complaint: No chief complaint on file.    Visit #: 3    SUBJECTIVE:  Patient reports her hand feels close to baseline, still slightly weaker, no pain or stiffness, intermittently compliant with HEP     OBJECTIVE:  Current objective measures:    Strength:  R: 19  L: 40        Therapeutic Exercises (CPT 86897):     1. putty squeeze, x30 reps, RUE, level IV blue putty    2. putty roll for finger extension, x30 reps, RUE, level IV blue putty    3. pinch and pull, x30 reps, RUE, level IV blue putty    4. digit flexion putty pulls, x30 reps, RUE, level IV blue putty    5. digit adduction, x30 reps, RUE, level IV blue putty    6. digiflex 7#, 3 x 10 reps with 2 second holds, RUE      Time-based treatments/modalities:           Pain rating before treatment: 0  Pain rating after treatment: 0    ASSESSMENT:   Response to treatment: Patient doing well, she has improved her  strength on R from 0 to 19 since last session, still at half power relative to L, she is using RUE functionally without difficulty. She was able to return demo HEP from memory today, we increased her putty resistance and recommend using harder putty for gross grasp, pinch and pull but retain easier putty for pulls and adduction. At this time we agree she is okay for self progression back to baseline with current HEP, will d/c from services.     PLAN/RECOMMENDATIONS:   Plan for treatment: no further treatment needed.  Planned interventions for next visit:  d/c OT

## 2024-12-17 NOTE — OP THERAPY DISCHARGE SUMMARY
Outpatient Occupational Therapy  DISCHARGE SUMMARY NOTE    [facility] Occupational Therapy [address]  [address]  [place] [address]  Phone:  [phone]  Fax:  [phone]    Date of Visit: 12/17/2024    Patient: Ani Archuleta  YOB: 2005  MRN: [id number]     Referring Provider: Veronica Lyn M.D.  [address] [address]   Referring Diagnosis: Pain in right hand [M79.641]         Functional Assessment Used        Your patient is being discharged from Occupational Therapy with the following comments:   Goals met    Comments:  Patient doing well, range normal, strength improving, independent with HEP, okay for self progression.      Limitations Remaining:  R hand weakness    Gretchen Hayes MS,OTR/L    Date: 12/17/2024

## 2024-12-19 ENCOUNTER — APPOINTMENT (OUTPATIENT)
Dept: OCCUPATIONAL THERAPY | Facility: REHABILITATION | Age: 19
End: 2024-12-19
Attending: STUDENT IN AN ORGANIZED HEALTH CARE EDUCATION/TRAINING PROGRAM
Payer: MEDICAID

## 2024-12-26 ENCOUNTER — APPOINTMENT (OUTPATIENT)
Dept: OCCUPATIONAL THERAPY | Facility: REHABILITATION | Age: 19
End: 2024-12-26
Attending: STUDENT IN AN ORGANIZED HEALTH CARE EDUCATION/TRAINING PROGRAM
Payer: MEDICAID

## 2024-12-27 ENCOUNTER — APPOINTMENT (OUTPATIENT)
Dept: RADIOLOGY | Facility: MEDICAL CENTER | Age: 19
End: 2024-12-27
Attending: EMERGENCY MEDICINE
Payer: MEDICAID

## 2024-12-27 ENCOUNTER — HOSPITAL ENCOUNTER (EMERGENCY)
Facility: MEDICAL CENTER | Age: 19
End: 2024-12-27
Attending: EMERGENCY MEDICINE
Payer: MEDICAID

## 2024-12-27 VITALS
RESPIRATION RATE: 16 BRPM | HEART RATE: 93 BPM | DIASTOLIC BLOOD PRESSURE: 60 MMHG | TEMPERATURE: 97.5 F | OXYGEN SATURATION: 90 % | SYSTOLIC BLOOD PRESSURE: 127 MMHG | BODY MASS INDEX: 42.66 KG/M2 | HEIGHT: 67 IN | WEIGHT: 271.83 LBS

## 2024-12-27 DIAGNOSIS — R10.2 PELVIC PAIN: ICD-10-CM

## 2024-12-27 LAB
ALBUMIN SERPL BCP-MCNC: 4.3 G/DL (ref 3.2–4.9)
ALBUMIN/GLOB SERPL: 1.4 G/DL
ALP SERPL-CCNC: 130 U/L (ref 30–99)
ALT SERPL-CCNC: 25 U/L (ref 2–50)
ANION GAP SERPL CALC-SCNC: 13 MMOL/L (ref 7–16)
APPEARANCE UR: CLEAR
AST SERPL-CCNC: 25 U/L (ref 12–45)
BASOPHILS # BLD AUTO: 0.4 % (ref 0–1.8)
BASOPHILS # BLD: 0.04 K/UL (ref 0–0.12)
BILIRUB SERPL-MCNC: 0.2 MG/DL (ref 0.1–1.5)
BILIRUB UR QL STRIP.AUTO: NEGATIVE
BUN SERPL-MCNC: 11 MG/DL (ref 8–22)
CALCIUM ALBUM COR SERPL-MCNC: 9.4 MG/DL (ref 8.5–10.5)
CALCIUM SERPL-MCNC: 9.6 MG/DL (ref 8.5–10.5)
CHLORIDE SERPL-SCNC: 105 MMOL/L (ref 96–112)
CO2 SERPL-SCNC: 21 MMOL/L (ref 20–33)
COLOR UR: YELLOW
CREAT SERPL-MCNC: 0.7 MG/DL (ref 0.5–1.4)
EOSINOPHIL # BLD AUTO: 0.06 K/UL (ref 0–0.51)
EOSINOPHIL NFR BLD: 0.6 % (ref 0–6.9)
ERYTHROCYTE [DISTWIDTH] IN BLOOD BY AUTOMATED COUNT: 38.8 FL (ref 35.9–50)
GFR SERPLBLD CREATININE-BSD FMLA CKD-EPI: 127 ML/MIN/1.73 M 2
GLOBULIN SER CALC-MCNC: 3 G/DL (ref 1.9–3.5)
GLUCOSE SERPL-MCNC: 101 MG/DL (ref 65–99)
GLUCOSE UR STRIP.AUTO-MCNC: NEGATIVE MG/DL
HCG SERPL QL: NEGATIVE
HCT VFR BLD AUTO: 43.9 % (ref 37–47)
HGB BLD-MCNC: 14.3 G/DL (ref 12–16)
IMM GRANULOCYTES # BLD AUTO: 0.03 K/UL (ref 0–0.11)
IMM GRANULOCYTES NFR BLD AUTO: 0.3 % (ref 0–0.9)
KETONES UR STRIP.AUTO-MCNC: ABNORMAL MG/DL
LEUKOCYTE ESTERASE UR QL STRIP.AUTO: NEGATIVE
LIPASE SERPL-CCNC: 28 U/L (ref 11–82)
LYMPHOCYTES # BLD AUTO: 3.31 K/UL (ref 1–4.8)
LYMPHOCYTES NFR BLD: 34.8 % (ref 22–41)
MCH RBC QN AUTO: 27.8 PG (ref 27–33)
MCHC RBC AUTO-ENTMCNC: 32.6 G/DL (ref 32.2–35.5)
MCV RBC AUTO: 85.4 FL (ref 81.4–97.8)
MICRO URNS: ABNORMAL
MONOCYTES # BLD AUTO: 0.81 K/UL (ref 0–0.85)
MONOCYTES NFR BLD AUTO: 8.5 % (ref 0–13.4)
NEUTROPHILS # BLD AUTO: 5.25 K/UL (ref 1.82–7.42)
NEUTROPHILS NFR BLD: 55.4 % (ref 44–72)
NITRITE UR QL STRIP.AUTO: NEGATIVE
NRBC # BLD AUTO: 0 K/UL
NRBC BLD-RTO: 0 /100 WBC (ref 0–0.2)
PH UR STRIP.AUTO: 7 [PH] (ref 5–8)
PLATELET # BLD AUTO: 362 K/UL (ref 164–446)
PMV BLD AUTO: 9.6 FL (ref 9–12.9)
POTASSIUM SERPL-SCNC: 4.5 MMOL/L (ref 3.6–5.5)
PROT SERPL-MCNC: 7.3 G/DL (ref 6–8.2)
PROT UR QL STRIP: NEGATIVE MG/DL
RBC # BLD AUTO: 5.14 M/UL (ref 4.2–5.4)
RBC UR QL AUTO: NEGATIVE
SODIUM SERPL-SCNC: 139 MMOL/L (ref 135–145)
SP GR UR STRIP.AUTO: 1.03
UROBILINOGEN UR STRIP.AUTO-MCNC: 1 EU/DL
WBC # BLD AUTO: 9.5 K/UL (ref 4.8–10.8)

## 2024-12-27 PROCEDURE — 99284 EMERGENCY DEPT VISIT MOD MDM: CPT

## 2024-12-27 PROCEDURE — 96374 THER/PROPH/DIAG INJ IV PUSH: CPT | Mod: XU

## 2024-12-27 PROCEDURE — 74177 CT ABD & PELVIS W/CONTRAST: CPT

## 2024-12-27 PROCEDURE — 700102 HCHG RX REV CODE 250 W/ 637 OVERRIDE(OP): Mod: UD | Performed by: EMERGENCY MEDICINE

## 2024-12-27 PROCEDURE — 84703 CHORIONIC GONADOTROPIN ASSAY: CPT

## 2024-12-27 PROCEDURE — 700117 HCHG RX CONTRAST REV CODE 255: Mod: UD | Performed by: EMERGENCY MEDICINE

## 2024-12-27 PROCEDURE — A9270 NON-COVERED ITEM OR SERVICE: HCPCS | Mod: UD | Performed by: EMERGENCY MEDICINE

## 2024-12-27 PROCEDURE — 85025 COMPLETE CBC W/AUTO DIFF WBC: CPT

## 2024-12-27 PROCEDURE — 36415 COLL VENOUS BLD VENIPUNCTURE: CPT

## 2024-12-27 PROCEDURE — 83690 ASSAY OF LIPASE: CPT

## 2024-12-27 PROCEDURE — 80053 COMPREHEN METABOLIC PANEL: CPT

## 2024-12-27 PROCEDURE — 81003 URINALYSIS AUTO W/O SCOPE: CPT

## 2024-12-27 PROCEDURE — 76830 TRANSVAGINAL US NON-OB: CPT

## 2024-12-27 PROCEDURE — 700111 HCHG RX REV CODE 636 W/ 250 OVERRIDE (IP): Mod: UD | Performed by: EMERGENCY MEDICINE

## 2024-12-27 RX ORDER — HYDROMORPHONE HYDROCHLORIDE 1 MG/ML
0.5 INJECTION, SOLUTION INTRAMUSCULAR; INTRAVENOUS; SUBCUTANEOUS ONCE
Status: COMPLETED | OUTPATIENT
Start: 2024-12-27 | End: 2024-12-27

## 2024-12-27 RX ORDER — IBUPROFEN 600 MG/1
600 TABLET, FILM COATED ORAL EVERY 6 HOURS PRN
Qty: 30 TABLET | Refills: 0 | Status: SHIPPED | OUTPATIENT
Start: 2024-12-27

## 2024-12-27 RX ORDER — OXYCODONE AND ACETAMINOPHEN 5; 325 MG/1; MG/1
1 TABLET ORAL ONCE
Status: COMPLETED | OUTPATIENT
Start: 2024-12-27 | End: 2024-12-27

## 2024-12-27 RX ORDER — ONDANSETRON 4 MG/1
4 TABLET, ORALLY DISINTEGRATING ORAL ONCE
Status: COMPLETED | OUTPATIENT
Start: 2024-12-27 | End: 2024-12-27

## 2024-12-27 RX ORDER — IBUPROFEN 600 MG/1
600 TABLET, FILM COATED ORAL ONCE
Status: COMPLETED | OUTPATIENT
Start: 2024-12-27 | End: 2024-12-27

## 2024-12-27 RX ADMIN — IBUPROFEN 600 MG: 600 TABLET, FILM COATED ORAL at 18:33

## 2024-12-27 RX ADMIN — ONDANSETRON 4 MG: 4 TABLET, ORALLY DISINTEGRATING ORAL at 18:33

## 2024-12-27 RX ADMIN — OXYCODONE HYDROCHLORIDE AND ACETAMINOPHEN 1 TABLET: 5; 325 TABLET ORAL at 18:33

## 2024-12-27 RX ADMIN — HYDROMORPHONE HYDROCHLORIDE 0.5 MG: 1 INJECTION, SOLUTION INTRAMUSCULAR; INTRAVENOUS; SUBCUTANEOUS at 20:24

## 2024-12-27 RX ADMIN — IOHEXOL 100 ML: 350 INJECTION, SOLUTION INTRAVENOUS at 20:45

## 2024-12-27 ASSESSMENT — PAIN DESCRIPTION - PAIN TYPE
TYPE: ACUTE PAIN
TYPE: ACUTE PAIN

## 2024-12-27 ASSESSMENT — FIBROSIS 4 INDEX: FIB4 SCORE: 0.25

## 2024-12-28 NOTE — ED PROVIDER NOTES
CHIEF COMPLAINT  Chief Complaint   Patient presents with    Abdominal Pain     RLQ, onset 0200 hrs. Sharp. 7:10. Increases with movement. Normal urination and BM reported. LBM today. Has appendix. Has situs inversus. LMP x 2 weeks ago. 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  Sudden onset of abdominal pain  Right lower quadrant/suprapubic area  2:00 in morning  Sharp stabbing.  Nonradiating.  No associated nausea vomiting no associated dysuria urgency or frequency.  And the patient has a history of polycystic ovarian disease    The patient states that she has not had this before.  She states that her mother is concerned that she might have appendicitis.  The patient also states that she did not take any meds pain medication was in the tub all day which did help her feel better but because of continued pain she is now here 14 hours later.  She is here with her fiancé and and friend/family member.    OUTSIDE HISTORIAN(S):  Did not reveal any further history    EXTERNAL RECORDS REVIEWED  Noted that the patient has a history of PCOS.    REVIEW OF SYSTEMS  None    PAST MEDICAL HISTORY  Past Medical History:   Diagnosis Date    Morbid obesity with BMI of 40.0-44.9, adult ([facility]) 08/11/2021    Situs inversus        FAMILY HISTORY  Family History   Adopted: Yes   Family history unknown: Yes       SOCIAL HISTORY  Social History     Tobacco Use    Smoking status: Never    Smokeless tobacco: Never   Vaping Use    Vaping status: Never Used   Substance Use Topics    Alcohol use: Not Currently     Comment: Admits to occasional drinking    Drug use: Never     Social History     Substance and Sexual Activity   Drug Use Never       SURGICAL HISTORY  Past Surgical History:   Procedure Laterality Date    INCISION AND DRAINAGE ORTHOPEDIC Right 10/18/2024    Procedure: INCISION AND DRAINAGE, MIDDLE, RING FINGER FLEXOR TENDON, DORSAL HAND,;  Surgeon: Veronica Barrett  "TOMY Lyn;  Location: SURGERY [facility];  Service: Hand    CARPAL TUNNEL RELEASE Right 10/18/2024    Procedure: RELEASE, CARPAL TUNNEL;  Surgeon: Veronica Lyn M.D.;  Location: SURGERY [facility];  Service: Hand    INCISION AND DRAINAGE GENERAL  2/6/2024    Procedure: INCISION AND DRAINAGE OF PILONIDAL ABSCESS;  Surgeon: Jeremy Gomez M.D.;  Location: SURGERY [facility];  Service: General       CURRENT MEDICATIONS  No current facility-administered medications for this encounter.    Current Outpatient Medications:     acetaminophen (TYLENOL) 500 MG Tab, Take 2 Tablets by mouth every 6 hours as needed for Mild Pain or Moderate Pain., Disp: 30 Tablet, Rfl: 0    ibuprofen (MOTRIN) 400 MG Tab, Take 1 Tablet by mouth every 6 hours as needed for Mild Pain or Moderate Pain., Disp: 30 Tablet, Rfl: 0    ALLERGIES  No Known Allergies    PHYSICAL EXAM  VITAL SIGNS: /77   Pulse 81   Temp 36.4 °C (97.5 °F) (Temporal)   Resp 18   Ht 1.702 m (5' 7\")   Wt 123 kg (271 lb 13.2 oz)   LMP 12/13/2024   SpO2 100%   BMI 42.57 kg/m²   Reviewed and noted.  Constitutional: Well developed, Well nourished, no acute distress.  HENT: Normocephalic, atraumatic, bilateral external ears normal, No intraoral erythema, edema, exudate  Eyes: PERRLA, conjunctiva pink, no scleral icterus.   Cardiovascular: Regular rate and rhythm. No murmurs, rubs or gallops.  No dependent edema or calf tenderness  Respiratory: Lungs clear to auscultation bilaterally. No wheezes, rales, or rhonchi.  Abdominal: Soft abdomen.  She has tenderness in the right suprapubic/lower quadrant.  Soft.  Skin: No erythema, no rash. No wounds or bruising.  Genitourinary: No costovertebral angle tenderness.   Musculoskeletal: no deformities.   Neurologic: Alert, no facial droop noted. All extra ocular muscles intact. Moves all extremities with out weakness noted  Psychiatric: Affect normal, Judgment normal, Mood normal.         MEDICAL DECISION " MAKING:  PROBLEMS EVALUATED THIS VISIT:  Suprapubic/abdominal pain.  Sudden onset at 2:00 in the morning.  Treated with warm baths.  Coming today with the normal vital signs tenderness in the pubic area and history of polycystic ovarian syndrome.  Patient also has sinus inversus as well.      Medical decision making.  Likely ovarian cyst possible UTI, ectopic needs to be ruled out gallbladder disease less likely appendix less likely as the patient's anatomy but diverticulitis along with others also in differential         PLAN:  Ultrasound  Then followed by CT scan if ultrasound is not definitive  Urine not  Pregnancy test  Metabolic panel  CBC  Lipase    RISK:  Moderate risk to get patient get prescription medication.    Diagnostic tests and prescription drugs considered including, but not limited to: Select: Stated narcotic pain medicine but the patient has received small dose of narcotic pain medicine over several different providers and I did not feel comfortable at this time..    Escalation of care considered, and ultimately not performed: Remained stable.  With vital signs ultrasound imaging unremarkable for life-threatening disease    Barriers to care at this time, including but not limited to: Select: None.     RESULTS    LABS Ordered and Reviewed by Me:  Results for orders placed or performed during the hospital encounter of 12/27/24   CBC WITH DIFFERENTIAL    Collection Time: 12/27/24  5:06 PM   Result Value Ref Range    WBC 9.5 4.8 - 10.8 K/uL    RBC 5.14 4.20 - 5.40 M/uL    Hemoglobin 14.3 12.0 - 16.0 g/dL    Hematocrit 43.9 37.0 - 47.0 %    MCV 85.4 81.4 - 97.8 fL    MCH 27.8 27.0 - 33.0 pg    MCHC 32.6 32.2 - 35.5 g/dL    RDW 38.8 35.9 - 50.0 fL    Platelet Count 362 164 - 446 K/uL    MPV 9.6 9.0 - 12.9 fL    Neutrophils-Polys 55.40 44.00 - 72.00 %    Lymphocytes 34.80 22.00 - 41.00 %    Monocytes 8.50 0.00 - 13.40 %    Eosinophils 0.60 0.00 - 6.90 %    Basophils 0.40 0.00 - 1.80 %    Immature  Granulocytes 0.30 0.00 - 0.90 %    Nucleated RBC 0.00 0.00 - 0.20 /100 WBC    Neutrophils (Absolute) 5.25 1.82 - 7.42 K/uL    Lymphs (Absolute) 3.31 1.00 - 4.80 K/uL    Monos (Absolute) 0.81 0.00 - 0.85 K/uL    Eos (Absolute) 0.06 0.00 - 0.51 K/uL    Baso (Absolute) 0.04 0.00 - 0.12 K/uL    Immature Granulocytes (abs) 0.03 0.00 - 0.11 K/uL    NRBC (Absolute) 0.00 K/uL   COMP METABOLIC PANEL    Collection Time: 12/27/24  5:06 PM   Result Value Ref Range    Sodium 139 135 - 145 mmol/L    Potassium 4.5 3.6 - 5.5 mmol/L    Chloride 105 96 - 112 mmol/L    Co2 21 20 - 33 mmol/L    Anion Gap 13.0 7.0 - 16.0    Glucose 101 (H) 65 - 99 mg/dL    Bun 11 8 - 22 mg/dL    Creatinine 0.70 0.50 - 1.40 mg/dL    Calcium 9.6 8.5 - 10.5 mg/dL    Correct Calcium 9.4 8.5 - 10.5 mg/dL    AST(SGOT) 25 12 - 45 U/L    ALT(SGPT) 25 2 - 50 U/L    Alkaline Phosphatase 130 (H) 30 - 99 U/L    Total Bilirubin 0.2 0.1 - 1.5 mg/dL    Albumin 4.3 3.2 - 4.9 g/dL    Total Protein 7.3 6.0 - 8.2 g/dL    Globulin 3.0 1.9 - 3.5 g/dL    A-G Ratio 1.4 g/dL   LIPASE    Collection Time: 12/27/24  5:06 PM   Result Value Ref Range    Lipase 28 11 - 82 U/L   HCG QUAL SERUM    Collection Time: 12/27/24  5:06 PM   Result Value Ref Range    Beta-Hcg Qualitative Serum Negative Negative   ESTIMATED GFR    Collection Time: 12/27/24  5:06 PM   Result Value Ref Range    GFR (CKD-EPI) 127 >60 mL/min/1.73 m 2   URINALYSIS,CULTURE IF INDICATED    Collection Time: 12/27/24  5:35 PM    Specimen: Urine   Result Value Ref Range    Color Yellow     Character Clear     Specific Gravity 1.028 <1.035    Ph 7.0 5.0 - 8.0    Glucose Negative Negative mg/dL    Ketones Trace (A) Negative mg/dL    Protein Negative Negative mg/dL    Bilirubin Negative Negative    Urobilinogen, Urine 1.0 <=1.0 EU/dL    Nitrite Negative Negative    Leukocyte Esterase Negative Negative    Occult Blood Negative Negative    Micro Urine Req see below          RADIOLOGY        Radiologist interpretation:    CT-ABDOMEN-PELVIS WITH   Final Result         1. Situs inversus.   2. Hepatomegaly.   3. No evidence for obstructive uropathy.   4. No evidence of bowel structure, diverticulosis, or appendicitis.   5. The intrauterine contraceptive device noted previously has been removed.      US-PELVIC COMPLETE (TRANSABDOMINAL/TRANSVAGINAL) (COMBO)   Final Result      1.  Normal transvaginal appearance of the pelvis.      2.  Trace of free fluid in the pelvic cul-de-sac.            ED COURSE:    ED Observation Status? No   No noted need for observation for developing issue    INTERVENTIONS BY ME:  Medications   oxyCODONE-acetaminophen (Percocet) 5-325 MG per tablet 1 Tablet (1 Tablet Oral Given 12/27/24 1833)   ibuprofen (Motrin) tablet 600 mg (600 mg Oral Given 12/27/24 1833)   ondansetron (Zofran ODT) dispertab 4 mg (4 mg Oral Given 12/27/24 1833)   HYDROmorphone (Dilaudid) injection 0.5 mg (0.5 mg Intravenous Given 12/27/24 2024)   iohexol (OMNIPAQUE) 350 mg/mL (IV) (100 mL Intravenous Given 12/27/24 2045)       Response on recheck:  Patient has improved.        FINAL DISPO PLAN   Motrin given his prescription    In summary 19-year-old female with polycystic ovarian syndrome and sinus versus who comes in today with sudden onset of pain at 2:00 in the morning treated with hot baths she is here with her fiancé patient had tenderness pubic area lab work CBC urinalysis lipase pregnancy did not reveal any gross abnormality and CT scan and also revealed trace free fluid at this point presumptive diagnosis of ruptured cyst was made but at this point because of the unknown cause patient was asked to return tomorrow if not improved.      Followup:  [facility], Emergency Dept  [address] [address] [phone]  Go to   If symptoms worsen      CONDITION: Stable.     FINAL IMPRESSION  1. Pelvic pain

## 2024-12-28 NOTE — DISCHARGE INSTRUCTIONS
At this point we are still not uncertain what happened.  We are guessing that perhaps he ruptured ovarian cyst.  We expect your pain to get significant better next 24 hours.  Recommend taking ibuprofen as needed for pain.  Obviously return if you are not feeling better.

## 2024-12-29 ENCOUNTER — APPOINTMENT (OUTPATIENT)
Dept: RADIOLOGY | Facility: MEDICAL CENTER | Age: 19
End: 2024-12-29
Attending: EMERGENCY MEDICINE
Payer: MEDICAID

## 2024-12-29 ENCOUNTER — HOSPITAL ENCOUNTER (EMERGENCY)
Facility: MEDICAL CENTER | Age: 19
End: 2024-12-29
Attending: EMERGENCY MEDICINE
Payer: MEDICAID

## 2024-12-29 VITALS
RESPIRATION RATE: 16 BRPM | TEMPERATURE: 98 F | DIASTOLIC BLOOD PRESSURE: 61 MMHG | WEIGHT: 275.13 LBS | OXYGEN SATURATION: 94 % | BODY MASS INDEX: 41.7 KG/M2 | HEART RATE: 86 BPM | HEIGHT: 68 IN | SYSTOLIC BLOOD PRESSURE: 124 MMHG

## 2024-12-29 DIAGNOSIS — R10.31 RIGHT LOWER QUADRANT ABDOMINAL PAIN: ICD-10-CM

## 2024-12-29 LAB
ALBUMIN SERPL BCP-MCNC: 4.1 G/DL (ref 3.2–4.9)
ALBUMIN/GLOB SERPL: 1.5 G/DL
ALP SERPL-CCNC: 111 U/L (ref 30–99)
ALT SERPL-CCNC: 18 U/L (ref 2–50)
ANION GAP SERPL CALC-SCNC: 14 MMOL/L (ref 7–16)
APPEARANCE UR: CLEAR
AST SERPL-CCNC: 13 U/L (ref 12–45)
BASOPHILS # BLD AUTO: 0.3 % (ref 0–1.8)
BASOPHILS # BLD: 0.02 K/UL (ref 0–0.12)
BILIRUB SERPL-MCNC: 0.3 MG/DL (ref 0.1–1.5)
BILIRUB UR QL STRIP.AUTO: NEGATIVE
BUN SERPL-MCNC: 9 MG/DL (ref 8–22)
CALCIUM ALBUM COR SERPL-MCNC: 8.9 MG/DL (ref 8.5–10.5)
CALCIUM SERPL-MCNC: 9 MG/DL (ref 8.5–10.5)
CHLORIDE SERPL-SCNC: 105 MMOL/L (ref 96–112)
CO2 SERPL-SCNC: 18 MMOL/L (ref 20–33)
COLOR UR: YELLOW
CREAT SERPL-MCNC: 0.56 MG/DL (ref 0.5–1.4)
EOSINOPHIL # BLD AUTO: 0.03 K/UL (ref 0–0.51)
EOSINOPHIL NFR BLD: 0.4 % (ref 0–6.9)
ERYTHROCYTE [DISTWIDTH] IN BLOOD BY AUTOMATED COUNT: 38.8 FL (ref 35.9–50)
GFR SERPLBLD CREATININE-BSD FMLA CKD-EPI: 134 ML/MIN/1.73 M 2
GLOBULIN SER CALC-MCNC: 2.7 G/DL (ref 1.9–3.5)
GLUCOSE SERPL-MCNC: 133 MG/DL (ref 65–99)
GLUCOSE UR STRIP.AUTO-MCNC: NEGATIVE MG/DL
HCT VFR BLD AUTO: 42.3 % (ref 37–47)
HGB BLD-MCNC: 14 G/DL (ref 12–16)
IMM GRANULOCYTES # BLD AUTO: 0.02 K/UL (ref 0–0.11)
IMM GRANULOCYTES NFR BLD AUTO: 0.3 % (ref 0–0.9)
KETONES UR STRIP.AUTO-MCNC: NEGATIVE MG/DL
LEUKOCYTE ESTERASE UR QL STRIP.AUTO: NEGATIVE
LIPASE SERPL-CCNC: 23 U/L (ref 11–82)
LYMPHOCYTES # BLD AUTO: 2.64 K/UL (ref 1–4.8)
LYMPHOCYTES NFR BLD: 34.4 % (ref 22–41)
MCH RBC QN AUTO: 28.1 PG (ref 27–33)
MCHC RBC AUTO-ENTMCNC: 33.1 G/DL (ref 32.2–35.5)
MCV RBC AUTO: 84.9 FL (ref 81.4–97.8)
MICRO URNS: NORMAL
MONOCYTES # BLD AUTO: 0.56 K/UL (ref 0–0.85)
MONOCYTES NFR BLD AUTO: 7.3 % (ref 0–13.4)
NEUTROPHILS # BLD AUTO: 4.4 K/UL (ref 1.82–7.42)
NEUTROPHILS NFR BLD: 57.3 % (ref 44–72)
NITRITE UR QL STRIP.AUTO: NEGATIVE
NRBC # BLD AUTO: 0 K/UL
NRBC BLD-RTO: 0 /100 WBC (ref 0–0.2)
PH UR STRIP.AUTO: 6 [PH] (ref 5–8)
PLATELET # BLD AUTO: 312 K/UL (ref 164–446)
PMV BLD AUTO: 10.1 FL (ref 9–12.9)
POTASSIUM SERPL-SCNC: 3.9 MMOL/L (ref 3.6–5.5)
PROT SERPL-MCNC: 6.8 G/DL (ref 6–8.2)
PROT UR QL STRIP: NEGATIVE MG/DL
RBC # BLD AUTO: 4.98 M/UL (ref 4.2–5.4)
RBC UR QL AUTO: NEGATIVE
SODIUM SERPL-SCNC: 137 MMOL/L (ref 135–145)
SP GR UR STRIP.AUTO: 1.01
UROBILINOGEN UR STRIP.AUTO-MCNC: 0.2 EU/DL
WBC # BLD AUTO: 7.7 K/UL (ref 4.8–10.8)

## 2024-12-29 PROCEDURE — 700117 HCHG RX CONTRAST REV CODE 255: Mod: UD | Performed by: EMERGENCY MEDICINE

## 2024-12-29 PROCEDURE — 74177 CT ABD & PELVIS W/CONTRAST: CPT

## 2024-12-29 PROCEDURE — 80053 COMPREHEN METABOLIC PANEL: CPT

## 2024-12-29 PROCEDURE — 96374 THER/PROPH/DIAG INJ IV PUSH: CPT | Mod: XU

## 2024-12-29 PROCEDURE — 36415 COLL VENOUS BLD VENIPUNCTURE: CPT

## 2024-12-29 PROCEDURE — 99284 EMERGENCY DEPT VISIT MOD MDM: CPT

## 2024-12-29 PROCEDURE — 96375 TX/PRO/DX INJ NEW DRUG ADDON: CPT

## 2024-12-29 PROCEDURE — 85025 COMPLETE CBC W/AUTO DIFF WBC: CPT

## 2024-12-29 PROCEDURE — 83690 ASSAY OF LIPASE: CPT

## 2024-12-29 PROCEDURE — 700111 HCHG RX REV CODE 636 W/ 250 OVERRIDE (IP): Mod: UD | Performed by: EMERGENCY MEDICINE

## 2024-12-29 PROCEDURE — 81003 URINALYSIS AUTO W/O SCOPE: CPT

## 2024-12-29 RX ORDER — KETOROLAC TROMETHAMINE 15 MG/ML
15 INJECTION, SOLUTION INTRAMUSCULAR; INTRAVENOUS ONCE
Status: COMPLETED | OUTPATIENT
Start: 2024-12-29 | End: 2024-12-29

## 2024-12-29 RX ORDER — MORPHINE SULFATE 4 MG/ML
4 INJECTION INTRAVENOUS ONCE
Status: COMPLETED | OUTPATIENT
Start: 2024-12-29 | End: 2024-12-29

## 2024-12-29 RX ORDER — ONDANSETRON 2 MG/ML
4 INJECTION INTRAMUSCULAR; INTRAVENOUS ONCE
Status: COMPLETED | OUTPATIENT
Start: 2024-12-29 | End: 2024-12-29

## 2024-12-29 RX ADMIN — KETOROLAC TROMETHAMINE 15 MG: 15 INJECTION, SOLUTION INTRAMUSCULAR; INTRAVENOUS at 16:51

## 2024-12-29 RX ADMIN — ONDANSETRON 4 MG: 2 INJECTION INTRAMUSCULAR; INTRAVENOUS at 15:13

## 2024-12-29 RX ADMIN — IOHEXOL 100 ML: 350 INJECTION, SOLUTION INTRAVENOUS at 15:58

## 2024-12-29 RX ADMIN — MORPHINE SULFATE 4 MG: 4 INJECTION INTRAVENOUS at 15:13

## 2024-12-29 ASSESSMENT — PAIN DESCRIPTION - PAIN TYPE
TYPE: ACUTE PAIN
TYPE: ACUTE PAIN

## 2024-12-29 ASSESSMENT — FIBROSIS 4 INDEX: FIB4 SCORE: 0.28

## 2024-12-29 ASSESSMENT — LIFESTYLE VARIABLES: DO YOU DRINK ALCOHOL: NO

## 2024-12-29 NOTE — ED TRIAGE NOTES
"Chief Complaint   Patient presents with    Abdominal Pain     Per pt she was seen here in the ED last Friday for the same symptoms and was told to come back if pain persist. Pt report RLQ abdominal pain, 8/10 that radiates to her lower back. -n/v        Pt ambulatory to triage. Pt A&Ox4, for above complaint.     Pt to lobby. Pt educated on alerting staff in changes to condition. Pt verbalized understanding.     /78   Pulse 75   Temp 36.7 °C (98 °F) (Temporal)   Resp 16   Ht 1.727 m (5' 8\")   Wt 125 kg (275 lb 2.2 oz)   LMP 12/13/2024   SpO2 96%   BMI 41.83 kg/m²    "

## 2024-12-29 NOTE — ED PROVIDER NOTES
ER Provider Note    Scribed for Lawson Guerrero D.O. by Flori Ramos. 12/29/2024  2:39 PM    Primary Care Provider: Indu Prince M.D.    CHIEF COMPLAINT  Chief Complaint   Patient presents with    Abdominal Pain     Per pt she was seen here in the ED last Friday for the same symptoms and was told to come back if pain persist. Pt report RLQ abdominal pain, 8/10 that radiates to her lower back. -n/v        HPI/ROS    Ani Archuleta is a 19 y.o. female who presents to the Emergency Department for abdominal pain onset 2 days ago. The patient was seen in the ED 12/27/24 for pelvic pain and CT imaging revealed no inflammation or infection. She reports that she has been having intermittent RLQ abdominal pain that radiates into her right flank for the past 2 days. She notes that today the episodes of pain have been increasing in frequency and duration prompting her to return to the ED. Currently in the ED she describes her pain as a 8/10 severity. She has associated nausea, vomiting, and diarrhea. She confirms that she started having diarrhea before her abdominal pain began. Denies history of abdominal surgeries. There are no known alleviating or exacerbating factors. The patient adds that all her organs are located on the opposite side of her body.     ROS as per HPI.    PAST MEDICAL HISTORY  Past Medical History:   Diagnosis Date    Morbid obesity with BMI of 40.0-44.9, adult ([facility]) 08/11/2021    Situs inversus        SURGICAL HISTORY  Past Surgical History:   Procedure Laterality Date    INCISION AND DRAINAGE ORTHOPEDIC Right 10/18/2024    Procedure: INCISION AND DRAINAGE, MIDDLE, RING FINGER FLEXOR TENDON, DORSAL HAND,;  Surgeon: Veronica Lyn M.D.;  Location: SURGERY [facility];  Service: Hand    CARPAL TUNNEL RELEASE Right 10/18/2024    Procedure: RELEASE, CARPAL TUNNEL;  Surgeon: Veronica Lyn M.D.;  Location: SURGERY [facility];  Service: Hand    INCISION AND DRAINAGE GENERAL   "2/6/2024    Procedure: INCISION AND DRAINAGE OF PILONIDAL ABSCESS;  Surgeon: Jeremy Gomez M.D.;  Location: SURGERY [facility];  Service: General       FAMILY HISTORY  Family History   Adopted: Yes   Family history unknown: Yes       SOCIAL HISTORY   reports that she has never smoked. She has never used smokeless tobacco. She reports that she does not currently use alcohol. She reports that she does not use drugs.    CURRENT MEDICATIONS  Current Outpatient Medications   Medication Instructions    Acetaminophen Extra Strength 1,000 mg, Oral, EVERY 6 HOURS PRN    ibuprofen (MOTRIN) 600 mg, Oral, EVERY 6 HOURS PRN      ALLERGIES  Patient has no known allergies.    PHYSICAL EXAM  /78   Pulse 75   Temp 36.7 °C (98 °F) (Temporal)   Resp 16   Ht 1.727 m (5' 8\")   Wt 125 kg (275 lb 2.2 oz)   LMP 12/13/2024   SpO2 96%   BMI 41.83 kg/m²     General: Mild distress.  HENT: Normocephalic, Mucus membranes are moist.   Chest: Lungs have even and unlabored respirations, Clear to auscultation.   Cardiovascular: Regular rate and regular rhythm, No peripheral cyanosis.  Abdomen: RLQ tenderness. Non distended.  Back: Right flank tenderness.  Neuro: Awake, Conversive, Able to relay recent events.  Psychiatric: Calm and cooperative.     EXTERNAL RECORDS REVIEWED  Review of patient's past medical records show the patient was seen in the ED 12/27/24 for pelvic pain and CT imaging revealed no acute abnormalities. US imaging revealed trace free fluid in the pelvic cul-de-sec. Her pain was thought to be most likely caused by a ruptured cyst. The patient has a history of Situs inversus and polycystic ovarian syndrome.     INITIAL ASSESSMENT    The patient was seen 2-3 days ago for similar complaints and CT imaging showed no infection or inflammation. Will reevaluate with CT to exclude inflammation and infection. Will treat for pain.     ED Observation Status? No; Patient does not meet criteria for ED Observation. "     DIAGNOSTIC STUDIES    Labs:   Results for orders placed or performed during the hospital encounter of 12/29/24   CBC WITH DIFFERENTIAL    Collection Time: 12/29/24  2:39 PM   Result Value Ref Range    WBC 7.7 4.8 - 10.8 K/uL    RBC 4.98 4.20 - 5.40 M/uL    Hemoglobin 14.0 12.0 - 16.0 g/dL    Hematocrit 42.3 37.0 - 47.0 %    MCV 84.9 81.4 - 97.8 fL    MCH 28.1 27.0 - 33.0 pg    MCHC 33.1 32.2 - 35.5 g/dL    RDW 38.8 35.9 - 50.0 fL    Platelet Count 312 164 - 446 K/uL    MPV 10.1 9.0 - 12.9 fL    Neutrophils-Polys 57.30 44.00 - 72.00 %    Lymphocytes 34.40 22.00 - 41.00 %    Monocytes 7.30 0.00 - 13.40 %    Eosinophils 0.40 0.00 - 6.90 %    Basophils 0.30 0.00 - 1.80 %    Immature Granulocytes 0.30 0.00 - 0.90 %    Nucleated RBC 0.00 0.00 - 0.20 /100 WBC    Neutrophils (Absolute) 4.40 1.82 - 7.42 K/uL    Lymphs (Absolute) 2.64 1.00 - 4.80 K/uL    Monos (Absolute) 0.56 0.00 - 0.85 K/uL    Eos (Absolute) 0.03 0.00 - 0.51 K/uL    Baso (Absolute) 0.02 0.00 - 0.12 K/uL    Immature Granulocytes (abs) 0.02 0.00 - 0.11 K/uL    NRBC (Absolute) 0.00 K/uL   COMP METABOLIC PANEL    Collection Time: 12/29/24  2:39 PM   Result Value Ref Range    Sodium 137 135 - 145 mmol/L    Potassium 3.9 3.6 - 5.5 mmol/L    Chloride 105 96 - 112 mmol/L    Co2 18 (L) 20 - 33 mmol/L    Anion Gap 14.0 7.0 - 16.0    Glucose 133 (H) 65 - 99 mg/dL    Bun 9 8 - 22 mg/dL    Creatinine 0.56 0.50 - 1.40 mg/dL    Calcium 9.0 8.5 - 10.5 mg/dL    Correct Calcium 8.9 8.5 - 10.5 mg/dL    AST(SGOT) 13 12 - 45 U/L    ALT(SGPT) 18 2 - 50 U/L    Alkaline Phosphatase 111 (H) 30 - 99 U/L    Total Bilirubin 0.3 0.1 - 1.5 mg/dL    Albumin 4.1 3.2 - 4.9 g/dL    Total Protein 6.8 6.0 - 8.2 g/dL    Globulin 2.7 1.9 - 3.5 g/dL    A-G Ratio 1.5 g/dL   LIPASE    Collection Time: 12/29/24  2:39 PM   Result Value Ref Range    Lipase 23 11 - 82 U/L   URINALYSIS (UA)    Collection Time: 12/29/24  2:39 PM    Specimen: Urine   Result Value Ref Range    Color Yellow      Character Clear     Specific Gravity 1.013 <1.035    Ph 6.0 5.0 - 8.0    Glucose Negative Negative mg/dL    Ketones Negative Negative mg/dL    Protein Negative Negative mg/dL    Bilirubin Negative Negative    Urobilinogen, Urine 0.2 <=1.0 EU/dL    Nitrite Negative Negative    Leukocyte Esterase Negative Negative    Occult Blood Negative Negative    Micro Urine Req see below    ESTIMATED GFR    Collection Time: 12/29/24  2:39 PM   Result Value Ref Range    GFR (CKD-EPI) 134 >60 mL/min/1.73 m 2     *Note: Due to a large number of results and/or encounters for the requested time period, some results have not been displayed. A complete set of results can be found in Results Review.        Radiology:   The attending emergency physician has independently interpreted the diagnostic imaging associated with this visit and am waiting the final reading from the radiologist.   Preliminary interpretation is as follows: No obstruction, no inflammation  Radiologist interpretation:   CT-ABDOMEN-PELVIS WITH   Final Result      1.  Situs inversus totalis.   2.  No acute inflammatory process in the abdomen or pelvis. Normal appendix in the left lower quadrant.         COURSE & MEDICAL DECISION MAKING     COURSE AND PLAN  2:39 PM - Patient seen and examined at bedside. Discussed plan of care, including imaging. Patient agrees to the plan of care. The patient will be medicated with 4 mg morphine and 4 mg Zofran. Ordered for CT- Abdomen-Pelvis, CBC with diff, CMP, Lipase, and UA to evaluate her symptoms.     5:00 PM - Patient was reevaluated at bedside. The patient is still having pain. Will treat with 15 mg Toradol.     5:12 PM - Patient reevaluated at bedside. She feels improved following medication administration. I discussed the patient's diagnostic study results. Discussed plan for discharge, including plan for follow-up, and informed them to return to the [facility] ED with any new or worsening symptoms. Patient was given the  opportunity for questions, and I addressed all questions or concerns. She is stable for discharge at this time. Patient verbalizes understanding and support with my plan for discharge.     ED Summary: The patient has situs inversus, her appendix is visualized on the left.  There is no signs of infection no signs of obstruction.  Lab tests were reassuring also.  The etiology of this pain is yet unknown.  There is no indication or need for emergent surgery or admission.  She is discharged home to use Motrin and Tylenol for pain and follow-up with a primary care doctor and to return if her symptoms change or worsen.      DISPOSITION AND DISCUSSIONS  I have discussed management of the patient with the following physicians and ANDREEA's: None    Discussion of management with other [facility] or appropriate source(s): None    Barriers to care at this time, including but not limited to: None     The patient will return for new or worsening symptoms and is stable at the time of discharge.    The patient is referred to a primary physician for blood pressure management, diabetic screening, and for all other preventative health concerns.    DISPOSITION:  Patient will be discharged home in stable condition.    FOLLOW UP:  Indu Prince M.D.  [address] [place] [id number]  [phone]    In 1 week      FINAL DIAGNOSIS  1. Right lower quadrant abdominal pain        IFlori (Scribjaime), am scribing for, and in the presence of, Lawson Guerrero D.O..    Electronically signed by: Flori Ramos (Scribe), 12/29/2024    ILawson D.O. personally performed the services described in this documentation, as scribed by Flori Ramos in my presence, and it is both accurate and complete.     The note accurately reflects work and decisions made by me.  Lawson Guerrero D.O.  12/29/2024  9:34 PM

## 2024-12-30 ENCOUNTER — OFFICE VISIT (OUTPATIENT)
Dept: MEDICAL GROUP | Facility: MEDICAL CENTER | Age: 19
End: 2024-12-30
Attending: FAMILY MEDICINE
Payer: MEDICAID

## 2024-12-30 VITALS
TEMPERATURE: 98.3 F | HEIGHT: 68 IN | DIASTOLIC BLOOD PRESSURE: 76 MMHG | BODY MASS INDEX: 40.92 KG/M2 | HEART RATE: 84 BPM | SYSTOLIC BLOOD PRESSURE: 124 MMHG | RESPIRATION RATE: 16 BRPM | WEIGHT: 270 LBS | OXYGEN SATURATION: 98 %

## 2024-12-30 DIAGNOSIS — R10.31 RLQ ABDOMINAL PAIN: ICD-10-CM

## 2024-12-30 DIAGNOSIS — Z76.89 ENCOUNTER FOR WEIGHT MANAGEMENT: ICD-10-CM

## 2024-12-30 DIAGNOSIS — R73.9 HYPERGLYCEMIA: ICD-10-CM

## 2024-12-30 DIAGNOSIS — Z13.6 SCREENING FOR CARDIOVASCULAR CONDITION: ICD-10-CM

## 2024-12-30 DIAGNOSIS — E28.2 PCOS (POLYCYSTIC OVARIAN SYNDROME): ICD-10-CM

## 2024-12-30 DIAGNOSIS — Z13.79 GENETIC TESTING OF FEMALE: ICD-10-CM

## 2024-12-30 PROBLEM — Q89.3 SITUS INVERSUS TOTALIS: Chronic | Status: ACTIVE | Noted: 2024-01-23

## 2024-12-30 PROCEDURE — 99213 OFFICE O/P EST LOW 20 MIN: CPT | Performed by: FAMILY MEDICINE

## 2024-12-30 ASSESSMENT — FIBROSIS 4 INDEX: FIB4 SCORE: 0.2

## 2024-12-30 NOTE — ED NOTES
Patient has been provided written and verbal education on abdominal pain. She has been instructed for follow up with primary care next week. She is ambulatory onDC

## 2024-12-30 NOTE — PROGRESS NOTES
Verbal consent was acquired by the patient to use [employer] ambient listening note generation during this visit.    Subjective   Chief Complaint   Patient presents with    Abdominal Pain        HPI:   Ani presents today with her fiance Lawson.    History of Present Illness  The patient presents for evaluation of right lower abdominal pain.    She was in the ER on 12/27/2024, 3 days ago, with the onset of right lower abdominal pain. Despite normal lab results and imaging studies, the pain persisted, leading to a subsequent ER visit yesterday. The pain has since radiated to her lower back. She reports that the pain initially presented as severe upon awakening, disrupting her sleep. She does not report any recent dietary changes or travel history. She also reports experiencing pain during bowel movements, which occur daily and are often watery in consistency. Her diet varies, including fast food, pizza rolls, steak, and chicken, with meals typically prepared by her mother. She rates her stool consistency as a 5 on the [employer] stool chart. She experiences severe pain even when consuming water. She has a history of Polycystic Ovary Syndrome (PCOS) and last consulted with a gynecologist approximately 4 months ago. She has been managing her symptoms with ibuprofen and Tylenol, which provide some relief. However, she notes that the pain intensifies after eating or drinking.    She expresses interest in using Ozempic for weight loss.    FAMILY HISTORY  The patient is adopted and does not have information about their family medical history.    MEDICATIONS  Ibuprofen, Tylenol        Health Maintenance Due   Topic Date Due    HIV Screening  Never done    Hepatitis C Screening  Never done    Men B Protection - Shared Decision Making  Never done    COVID-19 Vaccine (1 - 2024-25 season) Never done       Objective   Social History     Tobacco Use    Smoking status: Never    Smokeless tobacco: Never   Vaping Use    Vaping  status: Never Used   Substance Use Topics    Alcohol use: Not Currently     Comment: Admits to occasional drinking    Drug use: Never       Exam:  Wt 122 kg (270 lb)   LMP 12/13/2024   BMI 41.05 kg/m²     Physical Exam  Constitutional: Alert, no distress  Skin: No rashes in visible areas  Eye: Conjunctiva clear, lids normal  Respiratory: Unlabored respiratory effort, no cough  Abdomen: mildly tenderness to palpation to RLQ pain, otherwise normal exam  MSK: Normal gait, moves all extremities  Psych: Alert and oriented x3, normal affect and mood    No Known Allergies    [facility] [address] [place] [address], [place] - [address] [facility]  [address] [facility] [place] [address]  Phone: [phone] Fax: [phone]    Current Outpatient Medications   Medication Sig Dispense Refill    ibuprofen (MOTRIN) 600 MG Tab Take 1 Tablet by mouth every 6 hours as needed for Moderate Pain. 30 Tablet 0    acetaminophen (TYLENOL) 500 MG Tab Take 2 Tablets by mouth every 6 hours as needed for Mild Pain or Moderate Pain. 30 Tablet 0     No current facility-administered medications for this visit.       Assessment & Plan    19 y.o. female with the following -   1. RLQ abdominal pain        2. PCOS (polycystic ovarian syndrome)  HEMOGLOBIN A1C      3. Hyperglycemia  Lipid Profile      4. Genetic testing of female  Referral to Genetic Research Studies      5. Screening for cardiovascular condition  Lipid Profile      6. Encounter for weight management            Assessment & Plan  1. Right lower abdominal pain.  The patient's symptoms may be attributed to her dietary habits, particularly the consumption of red and processed meats, which can potentially lead to constipation. Given her history of PCOS, there is a possibility that the pain could be due to a ruptured cyst. However, the likelihood of a blockage is currently low. She has been advised to maintain a symptom diary to monitor the onset and progression of her pain, and to identify  any potential triggers such as coffee, chocolate, spicy foods, or fried foods. She has been encouraged to schedule a follow-up appointment with her gynecologist. The use of heating pads has been suggested for muscle-related discomfort. She has been instructed to continue taking ibuprofen for its anti-inflammatory properties. She has been informed that she can contact the office via [employer] message or virtual visit if she has any concerns. An A1c test will be ordered to check for diabetes, and a cholesterol test will be ordered, for which she should fast for at least 8 hours prior to the blood work. A referral for genetic testing through the [facility] will be made. If she experiences new, severe, or worsening symptoms, she should seek immediate medical attention.    2. Weight management.  She has expressed interest in using Ozempic for weight loss. She has been informed that Medicaid insurance does not cover this medication for weight loss, but it does for diabetes. Therefore, the A1c test will help determine the next steps. Encouraged heart healthy diet and exercise; resources and recommendations shared via secure messaging.      Follow-up  The patient will follow up in 3 months.      Return in about 3 months (around 3/30/2025) for routine annual wellness exam.    Please note that this note was created using voice recognition software. I have made every reasonable attempt to correct obvious errors, but I expect that there are errors of grammar and possibly content that I did not discover before finalizing the note.

## 2024-12-30 NOTE — DISCHARGE INSTRUCTIONS
I can find no significant cause for his abdominal pain.  Please continue Motrin and Tylenol.  Take 2 extra strength Tylenol along with 3 over-the-counter ibuprofen every 6 hours to assist with pain.        Please call your primary care doctor tomorrow morning to make a follow-up appointment.  Return if symptoms change or worsen

## 2025-01-03 ENCOUNTER — HOSPITAL ENCOUNTER (OUTPATIENT)
Dept: LAB | Facility: MEDICAL CENTER | Age: 20
End: 2025-01-03
Attending: FAMILY MEDICINE
Payer: MEDICAID

## 2025-01-03 DIAGNOSIS — Z13.6 SCREENING FOR CARDIOVASCULAR CONDITION: ICD-10-CM

## 2025-01-03 DIAGNOSIS — R73.9 HYPERGLYCEMIA: ICD-10-CM

## 2025-01-03 DIAGNOSIS — E28.2 PCOS (POLYCYSTIC OVARIAN SYNDROME): ICD-10-CM

## 2025-01-03 LAB
CHOLEST SERPL-MCNC: 183 MG/DL (ref 100–199)
EST. AVERAGE GLUCOSE BLD GHB EST-MCNC: 100 MG/DL
HBA1C MFR BLD: 5.1 % (ref 4–5.6)
HDLC SERPL-MCNC: 37 MG/DL
LDLC SERPL CALC-MCNC: 106 MG/DL
TRIGL SERPL-MCNC: 199 MG/DL (ref 0–149)

## 2025-01-03 PROCEDURE — 80061 LIPID PANEL: CPT

## 2025-01-03 PROCEDURE — 83036 HEMOGLOBIN GLYCOSYLATED A1C: CPT

## 2025-01-03 PROCEDURE — 36415 COLL VENOUS BLD VENIPUNCTURE: CPT
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
## 2025-01-30 ENCOUNTER — OFFICE VISIT (OUTPATIENT)
Dept: URGENT CARE | Facility: PHYSICIAN GROUP | Age: 20
End: 2025-01-30
Payer: MEDICAID

## 2025-01-30 ENCOUNTER — HOSPITAL ENCOUNTER (OUTPATIENT)
Facility: MEDICAL CENTER | Age: 20
End: 2025-01-30
Attending: FAMILY MEDICINE
Payer: MEDICAID

## 2025-01-30 VITALS
SYSTOLIC BLOOD PRESSURE: 124 MMHG | WEIGHT: 268 LBS | DIASTOLIC BLOOD PRESSURE: 74 MMHG | HEIGHT: 68 IN | HEART RATE: 102 BPM | TEMPERATURE: 98.8 F | OXYGEN SATURATION: 97 % | BODY MASS INDEX: 40.62 KG/M2 | RESPIRATION RATE: 20 BRPM

## 2025-01-30 DIAGNOSIS — N30.01 ACUTE CYSTITIS WITH HEMATURIA: ICD-10-CM

## 2025-01-30 LAB
APPEARANCE UR: NORMAL
BILIRUB UR STRIP-MCNC: NORMAL MG/DL
COLOR UR AUTO: YELLOW
GLUCOSE UR STRIP.AUTO-MCNC: NORMAL MG/DL
KETONES UR STRIP.AUTO-MCNC: NORMAL MG/DL
LEUKOCYTE ESTERASE UR QL STRIP.AUTO: NORMAL
NITRITE UR QL STRIP.AUTO: NORMAL
PH UR STRIP.AUTO: 6 [PH] (ref 5–8)
PROT UR QL STRIP: >=300 MG/DL
RBC UR QL AUTO: NORMAL
SP GR UR STRIP.AUTO: >=1.03
UROBILINOGEN UR STRIP-MCNC: 1 MG/DL

## 2025-01-30 PROCEDURE — 87480 CANDIDA DNA DIR PROBE: CPT

## 2025-01-30 PROCEDURE — 87660 TRICHOMONAS VAGIN DIR PROBE: CPT

## 2025-01-30 PROCEDURE — 87491 CHLMYD TRACH DNA AMP PROBE: CPT

## 2025-01-30 PROCEDURE — 87591 N.GONORRHOEAE DNA AMP PROB: CPT

## 2025-01-30 PROCEDURE — 87510 GARDNER VAG DNA DIR PROBE: CPT

## 2025-01-30 RX ORDER — OXYCODONE AND ACETAMINOPHEN 5; 325 MG/1; MG/1
TABLET ORAL
COMMUNITY
Start: 2024-11-06

## 2025-01-30 RX ORDER — NITROFURANTOIN 25; 75 MG/1; MG/1
100 CAPSULE ORAL 2 TIMES DAILY
Qty: 10 CAPSULE | Refills: 0 | Status: SHIPPED | OUTPATIENT
Start: 2025-01-30 | End: 2025-02-04

## 2025-01-30 RX ORDER — TRAMADOL HYDROCHLORIDE 50 MG/1
TABLET ORAL
COMMUNITY
Start: 2024-11-13

## 2025-01-30 RX ORDER — ONDANSETRON 4 MG/1
TABLET, ORALLY DISINTEGRATING ORAL
COMMUNITY
Start: 2024-11-06

## 2025-01-30 RX ORDER — CYCLOBENZAPRINE HCL 5 MG
TABLET ORAL
COMMUNITY
Start: 2025-01-22

## 2025-01-30 ASSESSMENT — FIBROSIS 4 INDEX: FIB4 SCORE: 0.2

## 2025-01-31 DIAGNOSIS — N30.01 ACUTE CYSTITIS WITH HEMATURIA: ICD-10-CM

## 2025-01-31 LAB
CANDIDA DNA VAG QL PROBE+SIG AMP: NEGATIVE
G VAGINALIS DNA VAG QL PROBE+SIG AMP: NEGATIVE
T VAGINALIS DNA VAG QL PROBE+SIG AMP: NEGATIVE

## 2025-01-31 NOTE — PROGRESS NOTES
Subjective:      CC:  presents with Dysuria            Dysuria   This is a new problem. The current episode started in the past 3 days. The problem occurs every urination. The problem has been unchanged. The quality of the pain is described as burning. There has been no fever. Pt is   sexually active. There is no history of pyelonephritis. Associated symptoms include frequency and urgency. Pertinent negatives include no chills, discharge, flank pain, nausea or vomiting. Pt has tried nothing for the symptoms. There is no history of recurrent UTIs.     Social History     Socioeconomic History    Marital status: Single     Spouse name: Not on file    Number of children: 0    Years of education: Not on file    Highest education level: High school graduate   Occupational History     Comment:    Tobacco Use    Smoking status: Never    Smokeless tobacco: Never   Vaping Use    Vaping status: Never Used   Substance and Sexual Activity    Alcohol use: Not Currently     Comment: Admits to occasional drinking    Drug use: Never    Sexual activity: Yes     Partners: Male     Birth control/protection: Condom Male   Other Topics Concern    Interpersonal relationships Not Asked    Poor school performance Not Asked    Reading difficulties Not Asked    Speech difficulties Not Asked    Writing difficulties Not Asked    Inadequate sleep Not Asked    Excessive TV viewing Not Asked    Excessive video game use Not Asked    Inadequate exercise Not Asked    Sports related Not Asked    Poor diet Not Asked    Second-hand smoke exposure Not Asked    Family concerns for drug/alcohol abuse Not Asked    Violence concerns Not Asked    Poor oral hygiene Not Asked    Bike safety Not Asked    Family concerns vehicle safety Not Asked    Behavioral problems Not Asked    Sad or not enjoying activities Not Asked    Suicidal thoughts Not Asked   Social History Narrative    Lives with parents. Has 3 dogs, GSD mix, aguilar doodle, and husky mix. And 2  birds     Social Drivers of Health     Financial Resource Strain: Not on file   Food Insecurity: No Food Insecurity (10/18/2024)    Hunger Vital Sign     Worried About Running Out of Food in the Last Year: Never true     Ran Out of Food in the Last Year: Never true   Transportation Needs: No Transportation Needs (10/18/2024)    PRAPARE - Transportation     Lack of Transportation (Medical): No     Lack of Transportation (Non-Medical): No   Physical Activity: Not on file   Stress: Not on file   Social Connections: Not on file   Intimate Partner Violence: Not At Risk (10/18/2024)    Humiliation, Afraid, Rape, and Kick questionnaire     Fear of Current or Ex-Partner: No     Emotionally Abused: No     Physically Abused: No     Sexually Abused: No   Housing Stability: Low Risk  (10/18/2024)    Housing Stability Vital Sign     Unable to Pay for Housing in the Last Year: No     Number of Times Moved in the Last Year: 0     Homeless in the Last Year: No         Family History   Adopted: Yes   Family history unknown: Yes         No Known Allergies        Current Outpatient Medications on File Prior to Visit   Medication Sig Dispense Refill    cyclobenzaprine (FLEXERIL) 5 mg tablet TAKE 1 TABLET BY MOUTH ONCE DAILY AS NEEDED AT NIGHT FOR MUSCLE RELAXANT (Patient not taking: Reported on 1/30/2025)      ondansetron (ZOFRAN ODT) 4 MG TABLET DISPERSIBLE DISSOLVE 1 TABLET IN MOUTH THREE TIMES DAILY AS NEEDED FOR NAUSEA (Patient not taking: Reported on 1/30/2025)      oxyCODONE-acetaminophen (PERCOCET) 5-325 MG Tab TAKE 1 TABLET BY MOUTH TWICE DAILY AS NEEDED. DO NOT DRIVE AFTER TAKING (Patient not taking: Reported on 1/30/2025)      traMADol (ULTRAM) 50 MG Tab TAKE 1 TABLET BY MOUTH ONCE DAILY AT BEDTIME AS NEEDED FOR SEVERE PAIN. (Patient not taking: Reported on 1/30/2025)      ibuprofen (MOTRIN) 600 MG Tab Take 1 Tablet by mouth every 6 hours as needed for Moderate Pain. (Patient not taking: Reported on 1/30/2025) 30 Tablet 0     "acetaminophen (TYLENOL) 500 MG Tab Take 2 Tablets by mouth every 6 hours as needed for Mild Pain or Moderate Pain. (Patient not taking: Reported on 1/30/2025) 30 Tablet 0     No current facility-administered medications on file prior to visit.       Review of Systems   Constitutional: Negative for chills.   Respiratory: Negative for shortness of breath.    Cardiovascular: Negative for chest pain.   Gastrointestinal: Negative for nausea, vomiting and abdominal pain.   Genitourinary: Positive for dysuria, urgency and frequency. Negative for flank pain.   Skin: Negative for rash.   Neurological: Negative for dizziness and headaches.   All other systems reviewed and are negative.         Objective:      /74   Pulse (!) 102   Temp 37.1 °C (98.8 °F) (Temporal)   Resp 20   Ht 1.727 m (5' 8\")   Wt 122 kg (268 lb)   SpO2 97%       Physical Exam   Constitutional: pt is oriented to person, place, and time. Pt appears well-developed and well-nourished. No distress.   HENT:   Head: Normocephalic and atraumatic.   Mouth/Throat: Mucous membranes are normal.   Eyes: Conjunctivae and EOM are normal. Pupils are equal, round, and reactive to light. Right eye exhibits no discharge. Left eye exhibits no discharge. No scleral icterus.   Neck: Normal range of motion. Neck supple.   Cardiovascular: Normal rate, regular rhythm, normal heart sounds and intact distal pulses.    No murmur heard.  Pulmonary/Chest: Effort normal and breath sounds normal. No respiratory distress. Pt has no wheezes,  rales.   Abdominal: Bowel sounds are normal. Pt exhibits no distension and no mass. There is no tenderness. There is no rebound, no guarding and no CVA tenderness.   Neurological: pt is alert and oriented to person, place, and time.   Skin: Skin is warm and dry.   Psychiatric: behavior is normal.   Nursing note and vitals reviewed.           Lab Results   Component Value Date/Time    CHAPINCITO jay 08/22/2024 03:14 PM    POCAPPEAR cloudy " 08/22/2024 03:14 PM    POCLEUKEST trace 08/22/2024 03:14 PM    POCNITRITE neg 08/22/2024 03:14 PM    POCUROBILIGE 0.2 08/22/2024 03:14 PM    POCPROTEIN 100 08/22/2024 03:14 PM    POCURPH 6.0 08/22/2024 03:14 PM    POCBLOOD large 08/22/2024 03:14 PM    POCSPGRV >=1.030 08/22/2024 03:14 PM    POCKETONES neg 08/22/2024 03:14 PM    POCBILIRUBIN neg 08/22/2024 03:14 PM    POCGLUCUA neg 08/22/2024 03:14 PM            Assessment/Plan:          1. Acute cystitis with hematuria    UA results c/w cystitis    - POCT Urinalysis  - nitrofurantoin (MACROBID) 100 MG Cap; Take 1 Capsule by mouth 2 times a day for 5 days.  Dispense: 10 Capsule; Refill: 0  - VAGINAL PATHOGENS DNA PANEL; Future  - Chlamydia/GC, PCR (Genital/Anal swab); Future        Differential diagnosis, natural history, supportive care, and indications for immediate follow-up discussed. All questions answered. Patient agrees with the plan of care.     Follow-up as needed if symptoms worsen or fail to improve to PCP, Urgent care or Emergency Room.     I have personally reviewed prior external notes and test results pertinent to today's visit.  I have independently reviewed and interpreted all diagnostics ordered during this urgent care acute visit.

## 2025-04-15 ENCOUNTER — OFFICE VISIT (OUTPATIENT)
Dept: URGENT CARE | Facility: PHYSICIAN GROUP | Age: 20
End: 2025-04-15
Payer: MEDICAID

## 2025-04-15 VITALS
DIASTOLIC BLOOD PRESSURE: 74 MMHG | HEART RATE: 97 BPM | BODY MASS INDEX: 42.69 KG/M2 | TEMPERATURE: 99.1 F | SYSTOLIC BLOOD PRESSURE: 116 MMHG | RESPIRATION RATE: 16 BRPM | OXYGEN SATURATION: 98 % | HEIGHT: 67 IN | WEIGHT: 272 LBS

## 2025-04-15 DIAGNOSIS — L73.9 FOLLICULITIS: ICD-10-CM

## 2025-04-15 PROCEDURE — 3074F SYST BP LT 130 MM HG: CPT | Performed by: FAMILY MEDICINE

## 2025-04-15 PROCEDURE — 3078F DIAST BP <80 MM HG: CPT | Performed by: FAMILY MEDICINE

## 2025-04-15 PROCEDURE — 99214 OFFICE O/P EST MOD 30 MIN: CPT | Performed by: FAMILY MEDICINE

## 2025-04-15 RX ORDER — SULFAMETHOXAZOLE AND TRIMETHOPRIM 800; 160 MG/1; MG/1
1 TABLET ORAL 2 TIMES DAILY
Qty: 10 TABLET | Refills: 0 | Status: SHIPPED | OUTPATIENT
Start: 2025-04-15 | End: 2025-04-20

## 2025-04-15 ASSESSMENT — FIBROSIS 4 INDEX: FIB4 SCORE: 0.2

## 2025-04-16 NOTE — PROGRESS NOTES
Subjective:      Chief Complaint   Patient presents with    Rash     Pt states she has a full body rash that she noticed a couple of days ago. Unsure of how long its been present.                Rash  This is a new problem. The current episode started in the past 3  days. The problem is unchanged. Pain location: ARMS, LEGS, TORSO, FACE. The rash is characterized by redness and itchiness. He was exposed to nothing. Pertinent negatives include no cough, diarrhea or fever. Past treatments include topical steroids. The treatment provided mild relief. There is no history of allergies.       Current Outpatient Medications on File Prior to Visit   Medication Sig Dispense Refill    cyclobenzaprine (FLEXERIL) 5 mg tablet TAKE 1 TABLET BY MOUTH ONCE DAILY AS NEEDED AT NIGHT FOR MUSCLE RELAXANT (Patient not taking: Reported on 4/15/2025)      ondansetron (ZOFRAN ODT) 4 MG TABLET DISPERSIBLE DISSOLVE 1 TABLET IN MOUTH THREE TIMES DAILY AS NEEDED FOR NAUSEA (Patient not taking: Reported on 4/15/2025)      oxyCODONE-acetaminophen (PERCOCET) 5-325 MG Tab TAKE 1 TABLET BY MOUTH TWICE DAILY AS NEEDED. DO NOT DRIVE AFTER TAKING (Patient not taking: Reported on 4/15/2025)      traMADol (ULTRAM) 50 MG Tab TAKE 1 TABLET BY MOUTH ONCE DAILY AT BEDTIME AS NEEDED FOR SEVERE PAIN. (Patient not taking: Reported on 4/15/2025)      ibuprofen (MOTRIN) 600 MG Tab Take 1 Tablet by mouth every 6 hours as needed for Moderate Pain. (Patient not taking: Reported on 4/15/2025) 30 Tablet 0    acetaminophen (TYLENOL) 500 MG Tab Take 2 Tablets by mouth every 6 hours as needed for Mild Pain or Moderate Pain. (Patient not taking: Reported on 4/15/2025) 30 Tablet 0     No current facility-administered medications on file prior to visit.       Social History     Tobacco Use    Smoking status: Never    Smokeless tobacco: Never   Vaping Use    Vaping status: Never Used   Substance Use Topics    Alcohol use: Not Currently     Comment: Admits to occasional  "drinking    Drug use: Never         Past Medical History:   Diagnosis Date    Morbid obesity with BMI of 40.0-44.9, adult ([facility]) 08/11/2021    Situs inversus              Review of Systems   Constitutional: Negative for fever.   Respiratory: Negative for cough.    Gastrointestinal: Negative for diarrhea.   Skin: Positive for rash.          Objective:     /74   Pulse 97   Temp 37.3 °C (99.1 °F) (Temporal)   Resp 16   Ht 1.702 m (5' 7\")   Wt 123 kg (272 lb)   SpO2 98%     Physical Exam   Constitutional: pt is oriented to person, place, and time. Pt appears well-developed. No distress.   HENT:   Head: Normocephalic and atraumatic.   Eyes: Conjunctivae are normal.   Cardiovascular: Normal rate, regular rhythm and normal heart sounds.    Pulmonary/Chest: Effort normal and breath sounds normal. No respiratory distress.   Neurological: pt is alert and oriented to person, place, and time. No cranial nerve deficit.   Skin: Skin is warm. Rash (diffuse, multiple small papules and pustules on an erythematous base, many  that are pierced by a central hair ) noted. Pt is not diaphoretic. There is erythema.   Psychiatric:  behavior is normal.   Nursing note and vitals reviewed.              Assessment/Plan:      1. Rash       Suspect folliculitis, although other causes have not been excluded.     I advised pt that it is not uncommon for a rash to start with very general and nonspecific characteristics, and become more differentiated over time.   Because of this, it is often challenging to diagnose rashes early in the course of illness.     She agrees to monitor the rash and advise me of any changes, either though pictures via MyChart, or by returning to clinic.    .      - sulfamethoxazole-trimethoprim (BACTRIM DS) 800-160 MG tablet; Take 1 Tablet by mouth 2 times a day for 5 days.  Dispense: 10 Tablet; Refill: 0        Differential diagnosis, natural history, supportive care, and indications for immediate follow-up " discussed. All questions answered. Patient agrees with the plan of care.     Follow-up as needed if symptoms worsen or fail to improve to PCP, Urgent care or Emergency Room.     I have personally reviewed prior external notes and test results pertinent to today's visit.  I have independently reviewed and interpreted all diagnostics ordered during this urgent care acute visit.

## 2025-05-30 ENCOUNTER — RESULTS FOLLOW-UP (OUTPATIENT)
Dept: URGENT CARE | Facility: PHYSICIAN GROUP | Age: 20
End: 2025-05-30

## 2025-05-30 ENCOUNTER — OFFICE VISIT (OUTPATIENT)
Dept: URGENT CARE | Facility: PHYSICIAN GROUP | Age: 20
End: 2025-05-30
Payer: MEDICAID

## 2025-05-30 VITALS
RESPIRATION RATE: 18 BRPM | BODY MASS INDEX: 42.69 KG/M2 | OXYGEN SATURATION: 97 % | TEMPERATURE: 97.8 F | HEIGHT: 67 IN | HEART RATE: 74 BPM | WEIGHT: 272 LBS | SYSTOLIC BLOOD PRESSURE: 126 MMHG | DIASTOLIC BLOOD PRESSURE: 78 MMHG

## 2025-05-30 DIAGNOSIS — J02.9 SORE THROAT: Primary | ICD-10-CM

## 2025-05-30 LAB — S PYO DNA SPEC NAA+PROBE: NOT DETECTED

## 2025-05-30 RX ORDER — DICYCLOMINE HCL 20 MG
20 TABLET ORAL
COMMUNITY
Start: 2025-05-14

## 2025-05-30 RX ORDER — LIDOCAINE HYDROCHLORIDE 20 MG/ML
15 SOLUTION OROPHARYNGEAL EVERY 4 HOURS PRN
Qty: 200 ML | Refills: 0 | Status: SHIPPED | OUTPATIENT
Start: 2025-05-30

## 2025-05-30 RX ORDER — CEPHALEXIN 500 MG/1
1000 CAPSULE ORAL 2 TIMES DAILY
COMMUNITY
Start: 2025-05-14

## 2025-05-30 RX ORDER — ONDANSETRON 8 MG/1
8 TABLET, ORALLY DISINTEGRATING ORAL
COMMUNITY
Start: 2025-05-14

## 2025-05-30 ASSESSMENT — FIBROSIS 4 INDEX: FIB4 SCORE: 0.18

## 2025-05-30 NOTE — PROGRESS NOTES
"CC:  presents with Pharyngitis            Pharyngitis   This is a new problem. The current episode started in the past 3 days. The problem has been unchanged. There has been   fever, Tmax 102. The pain is mild. Associated symptoms include a dry cough. Pertinent negatives include no abdominal pain,   diarrhea, headaches, shortness of breath or vomiting. no exposure to strep or mono.   has tried acetaminophen for the symptoms. The treatment provided mild relief.     Social History[1]    Past Medical History[2]    Review of Systems    HENT: Positive for sore throat  Respiratory: Negative for  sputum production and shortness of breath.    Cardiovascular: Negative for chest pain.   Gastrointestinal: Negative for nausea, vomiting, abdominal pain and diarrhea.   Genitourinary: Negative.    Neurological: Negative for dizziness and headaches.   All other systems reviewed and are negative.         Objective:   /78   Pulse 74   Temp 36.6 °C (97.8 °F) (Temporal)   Resp 18   Ht 1.702 m (5' 7\")   Wt 123 kg (272 lb)   SpO2 97%         Physical Exam   Constitutional:   oriented to person, place, and time.  appears well-developed and well-nourished. No distress.   HENT:   Head: Normocephalic and atraumatic.   Right Ear: External ear normal.   Left Ear: External ear normal.   Nose: Mucosal edema present. Right sinus exhibits no maxillary sinus tenderness and no frontal sinus tenderness. Left sinus exhibits no maxillary sinus tenderness and no frontal sinus tenderness.   Mouth/Throat: no posterior oropharyngeal exudate.   There is posterior oropharyngeal erythema present. No posterior oropharyngeal edema.   Tonsils ABSENT     Eyes: Conjunctivae and EOM are normal. Pupils are equal, round, and reactive to light. Right eye exhibits no discharge. Left eye exhibits no discharge. No scleral icterus.   Neck: Normal range of motion. Neck supple. No JVD present. No tracheal deviation present. No thyromegaly present. "   Cardiovascular: Normal rate, regular rhythm, normal heart sounds and intact distal pulses.  Exam reveals no friction rub.    No murmur heard.  Pulmonary/Chest: Effort normal and breath sounds normal. No respiratory distress.   no wheezes.   no rales.    Musculoskeletal:  exhibits no edema.   Lymphadenopathy:    no cervical LAD  Neurological:   alert and oriented to person, place, and time.   Skin: Skin is warm and dry. No erythema.   Psychiatric:   normal mood and affect.   Nursing note and vitals reviewed.             Assessment/Plan:     1. Sore throat  Rapid strep   negative.          - lidocaine (XYLOCAINE) 2 % Solution; Take 15 mL by mouth every four hours as needed for Throat/Mouth Pain.  Dispense: 200 mL; Refill: 0        Differential diagnosis, natural history, supportive care, and indications for immediate follow-up discussed. All questions answered. Patient agrees with the plan of care.     Follow-up as needed if symptoms worsen or fail to improve to PCP, Urgent care or Emergency Room.     I have personally reviewed prior external notes and test results pertinent to today's visit.  I have independently reviewed and interpreted all diagnostics ordered during this urgent care acute visit.          [1]   Social History  Tobacco Use    Smoking status: Never    Smokeless tobacco: Never   Vaping Use    Vaping status: Never Used   Substance Use Topics    Alcohol use: Not Currently     Comment: Admits to occasional drinking    Drug use: Never   [2]   Past Medical History:  Diagnosis Date    Morbid obesity with BMI of 40.0-44.9, adult ([facility]) 08/11/2021    Situs inversus

## 2025-07-31 ENCOUNTER — NON-PROVIDER VISIT (OUTPATIENT)
Dept: URGENT CARE | Facility: PHYSICIAN GROUP | Age: 20
End: 2025-07-31

## 2025-07-31 DIAGNOSIS — Z02.1 PRE-EMPLOYMENT DRUG SCREENING: Primary | ICD-10-CM

## 2025-07-31 LAB
AMP AMPHETAMINE: NORMAL
COC COCAINE: NORMAL
INT CON NEG: NORMAL
INT CON POS: NORMAL
MET METHAMPHETAMINES: NORMAL
OPI OPIATES: NORMAL
PCP PHENCYCLIDINE: NORMAL
POC DRUG COMMENT 753798-OCCUPATIONAL HEALTH: NEGATIVE
THC: NORMAL

## 2025-08-24 ENCOUNTER — APPOINTMENT (OUTPATIENT)
Dept: RADIOLOGY | Facility: MEDICAL CENTER | Age: 20
End: 2025-08-24
Attending: EMERGENCY MEDICINE
Payer: MEDICAID

## 2025-08-24 ENCOUNTER — HOSPITAL ENCOUNTER (EMERGENCY)
Facility: MEDICAL CENTER | Age: 20
End: 2025-08-24
Attending: EMERGENCY MEDICINE
Payer: MEDICAID

## 2025-08-24 VITALS
WEIGHT: 270.73 LBS | HEIGHT: 67 IN | OXYGEN SATURATION: 98 % | SYSTOLIC BLOOD PRESSURE: 121 MMHG | DIASTOLIC BLOOD PRESSURE: 78 MMHG | RESPIRATION RATE: 16 BRPM | HEART RATE: 98 BPM | TEMPERATURE: 98.2 F | BODY MASS INDEX: 42.49 KG/M2

## 2025-08-24 DIAGNOSIS — G89.29 CHRONIC PAIN OF LEFT KNEE: ICD-10-CM

## 2025-08-24 DIAGNOSIS — M25.562 ACUTE PAIN OF LEFT KNEE: Primary | ICD-10-CM

## 2025-08-24 DIAGNOSIS — M25.562 CHRONIC PAIN OF LEFT KNEE: ICD-10-CM

## 2025-08-24 PROCEDURE — 99283 EMERGENCY DEPT VISIT LOW MDM: CPT

## 2025-08-24 PROCEDURE — 73564 X-RAY EXAM KNEE 4 OR MORE: CPT | Mod: LT

## 2025-08-24 ASSESSMENT — FIBROSIS 4 INDEX: FIB4 SCORE: 0.18

## (undated) DEVICE — Device

## (undated) DEVICE — CANISTER SUCTION RIGID RED 1500CC (40EA/CA)

## (undated) DEVICE — SLEEVE, VASO, THIGH, MED

## (undated) DEVICE — CANISTER SUCTION 3000ML MECHANICAL FILTER AUTO SHUTOFF MEDI-VAC NONSTERILE LF DISP  (40EA/CA)

## (undated) DEVICE — SPONGE GAUZESTER 4 X 4 4PLY - (128PK/CA)

## (undated) DEVICE — GAUZE PACKING STRIP STERILE IODOFORM 1/2 IN X 5 YDS

## (undated) DEVICE — SODIUM CHL IRRIGATION 0.9% 1000ML (12EA/CA)

## (undated) DEVICE — TIP INTPLS HFLO ML ORFC BTRY - (12/CS) FOR SURGILAV

## (undated) DEVICE — CHLORAPREP 26 ML APPLICATOR - ORANGE TINT(25/CA)

## (undated) DEVICE — ELECTRODE DUAL RETURN W/ CORD - (50/PK)

## (undated) DEVICE — CORDS BIPOLAR COAGULATION - 12FT STERILE DISP. (10EA/BX)

## (undated) DEVICE — SET LEADWIRE 5 LEAD BEDSIDE DISPOSABLE ECG (1SET OF 5/EA)

## (undated) DEVICE — TUBE CONNECTING SUCTION - CLEAR PLASTIC STERILE 72 IN (50EA/CA)

## (undated) DEVICE — SYRINGE 10 ML CONTROL LL (25EA/BX 4BX/CA)

## (undated) DEVICE — SLEEVE VASO DVT COMPRESSION CALF MED - (10PR/CA)

## (undated) DEVICE — PADDING CAST 4 IN STERILE - 4 X 4 YDS (24/CA)

## (undated) DEVICE — HANDPIECE 10FT INTPLS SCT PLS IRRIGATION HAND CONTROL SET (6/PK)

## (undated) DEVICE — GOWN WARMING STANDARD FLEX - (30/CA)

## (undated) DEVICE — GLOVE BIOGEL SZ 6.5 SURGICAL PF LTX (50PR/BX 4BX/CA)

## (undated) DEVICE — COVER LIGHT HANDLE ALC PLUS DISP (18EA/BX)

## (undated) DEVICE — SWAB ANAEROBIC SPEC.COLLECTOR - (25/PK 4PK/CA 100EA/CA)

## (undated) DEVICE — COVER LIGHT HANDLE FLEXIBLE - SOFT (2EA/PK 80PK/CA)

## (undated) DEVICE — SUCTION INSTRUMENT YANKAUER BULBOUS TIP W/O VENT (50EA/CA)

## (undated) DEVICE — LACTATED RINGERS INJ 1000 ML - (14EA/CA 60CA/PF)

## (undated) DEVICE — TUBING CLEARLINK DUO-VENT - C-FLO (48EA/CA)

## (undated) DEVICE — STAPLER SKIN DISP - (6/BX 10BX/CA) VISISTAT

## (undated) DEVICE — SUTURE GENERAL

## (undated) DEVICE — DRESSING PETROLEUM GAUZE 5 X 9" (50EA/BX 4BX/CA)"

## (undated) DEVICE — MASK OXYGEN VNYL ADLT MED CONC WITH 7 FOOT TUBING - (50EA/CA)

## (undated) DEVICE — PACK MINOR BASIN - (2EA/CA)

## (undated) DEVICE — CANISTER SUCTION 3000ML MECHANICAL FILTER AUTO SHUTOFF MEDI-VAC NONSTERILE LF DISP (40EA/CA)

## (undated) DEVICE — GLOVE BIOGEL SZ 7.5 SURGICAL PF LTX - (50PR/BX 4BX/CA)

## (undated) DEVICE — TRAY SRGPRP PVP IOD WT PRP - (20/CA)

## (undated) DEVICE — PACK UPPER EXTREMITY (2EA/CA)

## (undated) DEVICE — PACK LOWER EXTREMITY - (2/CA)

## (undated) DEVICE — DRAPE LAPAROTOMY T SHEET - (12EA/CA)

## (undated) DEVICE — SUTURE 4-0 ETHILON P-3 18 IN (12PK/BX)

## (undated) DEVICE — SET EXTENSION WITH 2 PORTS (48EA/CA) ***PART #2C8610 IS A SUBSTITUTE*****

## (undated) DEVICE — GLOVE BIOGEL PI INDICATOR SZ 7.5 SURGICAL PF LF -(50/BX 4BX/CA)

## (undated) DEVICE — SYRINGE 30 ML LL (56/BX)

## (undated) DEVICE — GLOVE BIOGEL PI INDICATOR SZ 7.0 SURGICAL PF LF - (50/BX 4BX/CA)

## (undated) DEVICE — GLOVE SZ 6.5 BIOGEL PI MICRO - PF LF (50PR/BX)

## (undated) DEVICE — SYRINGE 30 ML LS (56/BX)

## (undated) DEVICE — SENSOR OXIMETER ADULT SPO2 RD SET (20EA/BX)

## (undated) DEVICE — KIT  I.V. START (100EA/CA)

## (undated) DEVICE — CANNULA O2 COMFORT SOFT EAR ADULT 7 FT TUBING (50/CA)

## (undated) DEVICE — TOWEL STOP TIMEOUT SAFETY FLAG (40EA/CA)

## (undated) DEVICE — TOURNIQUET CUFF 18 X 3 ONE PORT DISP - STERILE (10/BX)

## (undated) DEVICE — PAD PREP 24 X 48 CUFFED - (100/CA)

## (undated) DEVICE — BANDAGE ELASTIC 4 HONEYCOMB - 4"X5YD LF (20/CA)"